# Patient Record
Sex: MALE | Race: WHITE | NOT HISPANIC OR LATINO | Employment: OTHER | URBAN - METROPOLITAN AREA
[De-identification: names, ages, dates, MRNs, and addresses within clinical notes are randomized per-mention and may not be internally consistent; named-entity substitution may affect disease eponyms.]

---

## 2017-02-15 ENCOUNTER — OFFICE VISIT (OUTPATIENT)
Dept: GASTROENTEROLOGY | Facility: CLINIC | Age: 77
End: 2017-02-15

## 2017-02-15 VITALS
HEIGHT: 70 IN | SYSTOLIC BLOOD PRESSURE: 118 MMHG | DIASTOLIC BLOOD PRESSURE: 76 MMHG | WEIGHT: 205 LBS | BODY MASS INDEX: 29.35 KG/M2

## 2017-02-15 DIAGNOSIS — K63.5 COLON POLYPS: Primary | ICD-10-CM

## 2017-02-15 DIAGNOSIS — K21.9 GASTROESOPHAGEAL REFLUX DISEASE, ESOPHAGITIS PRESENCE NOT SPECIFIED: ICD-10-CM

## 2017-02-15 PROCEDURE — 99213 OFFICE O/P EST LOW 20 MIN: CPT | Performed by: INTERNAL MEDICINE

## 2017-02-15 RX ORDER — MIRTAZAPINE 15 MG/1
15 TABLET, FILM COATED ORAL NIGHTLY
COMMUNITY
End: 2022-09-23

## 2017-02-15 NOTE — PROGRESS NOTES
"Chief Complaint   Patient presents with   • Follow-up     from scopes    • Heartburn       History of Present Illness: We reviewed the results of the 12/16 EGD with esophageal dilation. He noted some improvement with the belching after the EGD. He is seeing Dr. Aly Rutherford (put on Remeron). He was sent to a therapist (Dr. Claude Drouet). He has been  50 yrs and his wife and he are going thru therapy and \"we will be fine\".  The Remeron is helping. No abdominal or chest pain. No nausea or vomting. His weight is increasing. NO dysphagia, minimal belching. She doesn't know if the Omeprazole 40 mg/day helps or not.     Past Medical History   Diagnosis Date   • Abnormal barium swallow 07/08/2016     HH, reflux   • Anxiety    • GERD (gastroesophageal reflux disease)    • H/O cataract      Dr Heron Hoffman   • Hiatal hernia    • Hyperlipidemia    • Hypertension    • Internal hemorrhoids    • Seizures      July 2013 was last seizure   • Tubular adenoma of colon        Past Surgical History   Procedure Laterality Date   • Hernia repair     • Colonoscopy  04/23/2013     Dr Galeano/[facility], , tubular adenoma w/low grade dysplasia   • Eye surgery     • Tonsillectomy     • Endoscopy N/A 12/16/2016     Procedure: ESOPHAGOGASTRODUODENOSCOPY WITH COLD BIOPSIES AND 54 FR KENNY DILATATION;  Surgeon: Shiraz Galeano MD;  Location: [facility] ENDOSCOPY;  Service:          Current Outpatient Prescriptions:   •  mirtazapine (REMERON) 15 MG tablet, Take 15 mg by mouth., Disp: , Rfl:   •  amLODIPine (NORVASC) 5 MG tablet, TAKE ONE TABLET BY MOUTH DAILY, Disp: 90 tablet, Rfl: 2  •  BENICAR HCT 40-25 MG per tablet, TAKE ONE TABLET BY MOUTH DAILY, Disp: 90 tablet, Rfl: 2  •  clonazePAM (KlonoPIN) 0.5 MG tablet, Take 0.5 mg by mouth 2 (Two) Times a Day As Needed for seizures., Disp: , Rfl:   •  lacosamide (VIMPAT) 100 MG tablet tablet, Take 100 mg by mouth every 12 (twelve) hours., Disp: , Rfl:   •  metoprolol succinate XL (TOPROL-XL) 25 " MG 24 hr tablet, TAKE ONE TABLET BY MOUTH DAILY, Disp: 90 tablet, Rfl: 3  •  omeprazole (PRILOSEC) 40 MG capsule, Take 1 capsule by mouth daily., Disp: 30 capsule, Rfl: 11  •  zolpidem (AMBIEN) 10 MG tablet, TAKE ONE TABLET BY MOUTH EVERY NIGHT AT BEDTIME AS NEEDED, Disp: 30 tablet, Rfl: 1    No Known Allergies    Family History   Problem Relation Age of Onset   • Stroke Father        Social History     Social History   • Marital status:      Spouse name: N/A   • Number of children: N/A   • Years of education: N/A     Occupational History   • Not on file.     Social History Main Topics   • Smoking status: Never Smoker   • Smokeless tobacco: Not on file   • Alcohol use 1.2 oz/week     1 Cans of beer, 1 Shots of liquor per week      Comment: daily   • Drug use: No   • Sexual activity: Not on file     Other Topics Concern   • Not on file     Social History Narrative       Review of Systems   All other systems reviewed and are negative.      Vitals:    02/15/17 1302   BP: 118/76       Physical Exam   Constitutional: He is oriented to person, place, and time. He appears well-developed and well-nourished. No distress.   HENT:   Head: Normocephalic and atraumatic. Hair is normal.   Right Ear: Hearing, tympanic membrane, external ear and ear canal normal.   Left Ear: Hearing, tympanic membrane, external ear and ear canal normal.   Nose: No sinus tenderness or nasal deformity.   Mouth/Throat: Uvula is midline, oropharynx is clear and moist and mucous membranes are normal. No oral lesions. No uvula swelling.   Eyes: Conjunctivae, EOM and lids are normal. Pupils are equal, round, and reactive to light. Right eye exhibits no discharge. Left eye exhibits no discharge. No scleral icterus. Right eye exhibits normal extraocular motion and no nystagmus. Left eye exhibits normal extraocular motion and no nystagmus.   Neck: Normal range of motion. Neck supple. No JVD present. No thyromegaly present.   Cardiovascular: Normal  rate, regular rhythm, normal heart sounds, intact distal pulses and normal pulses.  Exam reveals no gallop.    No murmur heard.  Pulmonary/Chest: Effort normal and breath sounds normal. No respiratory distress. He has no wheezes. He has no rales. He exhibits no tenderness.   Abdominal: Soft. Bowel sounds are normal. He exhibits no distension and no mass. There is no tenderness. There is no guarding. No hernia.   Musculoskeletal: Normal range of motion. He exhibits no edema, tenderness or deformity.   Lymphadenopathy:     He has no cervical adenopathy.   Neurological: He is alert and oriented to person, place, and time. He has normal reflexes. He displays normal reflexes. No cranial nerve deficit. He exhibits normal muscle tone. Coordination normal.   Skin: Skin is warm and dry. No rash noted. He is not diaphoretic.   Psychiatric: He has a normal mood and affect. His behavior is normal. Judgment and thought content normal.   Vitals reviewed.      Gabo was seen today for follow-up and heartburn.    Diagnoses and all orders for this visit:    Colon polyps    Gastroesophageal reflux disease, esophagitis presence not specified     Assessment:  1) h/o colonic polyps.  2) GERD    Recommendations:  1) Repeat c/s in 4/18.  2) try to wean the Omeprazole  3) f/u prn      No Follow-up on file.

## 2018-01-09 ENCOUNTER — HOSPITAL ENCOUNTER (INPATIENT)
Facility: HOSPITAL | Age: 78
LOS: 3 days | Discharge: HOME | End: 2018-01-13
Attending: EMERGENCY MEDICINE | Admitting: HOSPITALIST

## 2018-01-09 DIAGNOSIS — R56.9 SEIZURE (HCC): Primary | ICD-10-CM

## 2018-01-09 DIAGNOSIS — R42 DIZZY: ICD-10-CM

## 2018-01-09 DIAGNOSIS — I10 ESSENTIAL HYPERTENSION: ICD-10-CM

## 2018-01-09 PROCEDURE — 85025 COMPLETE CBC W/AUTO DIFF WBC: CPT | Performed by: EMERGENCY MEDICINE

## 2018-01-09 PROCEDURE — 99285 EMERGENCY DEPT VISIT HI MDM: CPT

## 2018-01-09 PROCEDURE — 85730 THROMBOPLASTIN TIME PARTIAL: CPT | Performed by: EMERGENCY MEDICINE

## 2018-01-09 PROCEDURE — 84484 ASSAY OF TROPONIN QUANT: CPT | Performed by: EMERGENCY MEDICINE

## 2018-01-09 PROCEDURE — 80053 COMPREHEN METABOLIC PANEL: CPT | Performed by: EMERGENCY MEDICINE

## 2018-01-09 PROCEDURE — 85610 PROTHROMBIN TIME: CPT | Performed by: EMERGENCY MEDICINE

## 2018-01-09 RX ORDER — LABETALOL HYDROCHLORIDE 5 MG/ML
10 INJECTION, SOLUTION INTRAVENOUS ONCE
Status: COMPLETED | OUTPATIENT
Start: 2018-01-09 | End: 2018-01-10

## 2018-01-09 RX ORDER — LOSARTAN POTASSIUM AND HYDROCHLOROTHIAZIDE 25; 100 MG/1; MG/1
1 TABLET ORAL DAILY
COMMUNITY
End: 2018-01-13 | Stop reason: HOSPADM

## 2018-01-09 RX ORDER — SODIUM CHLORIDE 0.9 % (FLUSH) 0.9 %
10 SYRINGE (ML) INJECTION AS NEEDED
Status: DISCONTINUED | OUTPATIENT
Start: 2018-01-09 | End: 2018-01-13 | Stop reason: HOSPADM

## 2018-01-10 ENCOUNTER — APPOINTMENT (OUTPATIENT)
Dept: GENERAL RADIOLOGY | Facility: HOSPITAL | Age: 78
End: 2018-01-10

## 2018-01-10 ENCOUNTER — APPOINTMENT (OUTPATIENT)
Dept: MRI IMAGING | Facility: HOSPITAL | Age: 78
End: 2018-01-10
Attending: HOSPITALIST

## 2018-01-10 ENCOUNTER — APPOINTMENT (OUTPATIENT)
Dept: CT IMAGING | Facility: HOSPITAL | Age: 78
End: 2018-01-10

## 2018-01-10 ENCOUNTER — APPOINTMENT (OUTPATIENT)
Dept: CARDIOLOGY | Facility: HOSPITAL | Age: 78
End: 2018-01-10
Attending: HOSPITALIST

## 2018-01-10 PROBLEM — R56.9 SEIZURE (HCC): Status: ACTIVE | Noted: 2018-01-10

## 2018-01-10 LAB
ALBUMIN SERPL-MCNC: 4.4 G/DL (ref 3.5–5.2)
ALBUMIN/GLOB SERPL: 1.4 G/DL
ALP SERPL-CCNC: 109 U/L (ref 39–117)
ALT SERPL W P-5'-P-CCNC: 26 U/L (ref 1–41)
ANION GAP SERPL CALCULATED.3IONS-SCNC: 15 MMOL/L
APTT PPP: 27.5 SECONDS (ref 22.7–35.4)
AST SERPL-CCNC: 34 U/L (ref 1–40)
BACTERIA UR QL AUTO: ABNORMAL /HPF
BASOPHILS # BLD AUTO: 0.04 10*3/MM3 (ref 0–0.2)
BASOPHILS NFR BLD AUTO: 0.4 % (ref 0–1.5)
BILIRUB SERPL-MCNC: 0.7 MG/DL (ref 0.1–1.2)
BILIRUB UR QL STRIP: NEGATIVE
BUN BLD-MCNC: 18 MG/DL (ref 8–23)
BUN/CREAT SERPL: 15.7 (ref 7–25)
CALCIUM SPEC-SCNC: 9.3 MG/DL (ref 8.6–10.5)
CHLORIDE SERPL-SCNC: 94 MMOL/L (ref 98–107)
CLARITY UR: CLEAR
CO2 SERPL-SCNC: 27 MMOL/L (ref 22–29)
COLOR UR: YELLOW
CREAT BLD-MCNC: 1.15 MG/DL (ref 0.76–1.27)
DEPRECATED RDW RBC AUTO: 42.8 FL (ref 37–54)
EOSINOPHIL # BLD AUTO: 0.14 10*3/MM3 (ref 0–0.7)
EOSINOPHIL NFR BLD AUTO: 1.4 % (ref 0.3–6.2)
ERYTHROCYTE [DISTWIDTH] IN BLOOD BY AUTOMATED COUNT: 12.6 % (ref 11.5–14.5)
FLUAV AG NPH QL: NEGATIVE
FLUBV AG NPH QL IA: NEGATIVE
GFR SERPL CREATININE-BSD FRML MDRD: 62 ML/MIN/1.73
GLOBULIN UR ELPH-MCNC: 3.2 GM/DL
GLUCOSE BLD-MCNC: 112 MG/DL (ref 65–99)
GLUCOSE BLDC GLUCOMTR-MCNC: 123 MG/DL (ref 70–130)
GLUCOSE BLDC GLUCOMTR-MCNC: 143 MG/DL (ref 70–130)
GLUCOSE UR STRIP-MCNC: NEGATIVE MG/DL
HCT VFR BLD AUTO: 41.2 % (ref 40.4–52.2)
HGB BLD-MCNC: 14 G/DL (ref 13.7–17.6)
HGB UR QL STRIP.AUTO: ABNORMAL
HYALINE CASTS UR QL AUTO: ABNORMAL /LPF
IMM GRANULOCYTES # BLD: 0.03 10*3/MM3 (ref 0–0.03)
IMM GRANULOCYTES NFR BLD: 0.3 % (ref 0–0.5)
INR PPP: 1 (ref 0.9–1.1)
KETONES UR QL STRIP: NEGATIVE
LEUKOCYTE ESTERASE UR QL STRIP.AUTO: NEGATIVE
LYMPHOCYTES # BLD AUTO: 0.4 10*3/MM3 (ref 0.9–4.8)
LYMPHOCYTES NFR BLD AUTO: 4.1 % (ref 19.6–45.3)
MCH RBC QN AUTO: 31.7 PG (ref 27–32.7)
MCHC RBC AUTO-ENTMCNC: 34 G/DL (ref 32.6–36.4)
MCV RBC AUTO: 93.4 FL (ref 79.8–96.2)
MONOCYTES # BLD AUTO: 0.75 10*3/MM3 (ref 0.2–1.2)
MONOCYTES NFR BLD AUTO: 7.6 % (ref 5–12)
NEUTROPHILS # BLD AUTO: 8.47 10*3/MM3 (ref 1.9–8.1)
NEUTROPHILS NFR BLD AUTO: 86.2 % (ref 42.7–76)
NITRITE UR QL STRIP: NEGATIVE
PH UR STRIP.AUTO: 7.5 [PH] (ref 5–8)
PLATELET # BLD AUTO: 192 10*3/MM3 (ref 140–500)
PMV BLD AUTO: 10.8 FL (ref 6–12)
POTASSIUM BLD-SCNC: 3.5 MMOL/L (ref 3.5–5.2)
PROT SERPL-MCNC: 7.6 G/DL (ref 6–8.5)
PROT UR QL STRIP: NEGATIVE
PROTHROMBIN TIME: 12.8 SECONDS (ref 11.7–14.2)
RBC # BLD AUTO: 4.41 10*6/MM3 (ref 4.6–6)
RBC # UR: ABNORMAL /HPF
REF LAB TEST METHOD: ABNORMAL
SODIUM BLD-SCNC: 136 MMOL/L (ref 136–145)
SP GR UR STRIP: 1.01 (ref 1–1.03)
SQUAMOUS #/AREA URNS HPF: ABNORMAL /HPF
TROPONIN T SERPL-MCNC: 0.01 NG/ML (ref 0–0.03)
TROPONIN T SERPL-MCNC: <0.01 NG/ML (ref 0–0.03)
UROBILINOGEN UR QL STRIP: ABNORMAL
WBC NRBC COR # BLD: 9.83 10*3/MM3 (ref 4.5–10.7)
WBC UR QL AUTO: ABNORMAL /HPF

## 2018-01-10 PROCEDURE — 87581 M.PNEUMON DNA AMP PROBE: CPT | Performed by: INTERNAL MEDICINE

## 2018-01-10 PROCEDURE — 99223 1ST HOSP IP/OBS HIGH 75: CPT | Performed by: NURSE PRACTITIONER

## 2018-01-10 PROCEDURE — 70496 CT ANGIOGRAPHY HEAD: CPT

## 2018-01-10 PROCEDURE — 87798 DETECT AGENT NOS DNA AMP: CPT | Performed by: INTERNAL MEDICINE

## 2018-01-10 PROCEDURE — 82962 GLUCOSE BLOOD TEST: CPT

## 2018-01-10 PROCEDURE — 93306 TTE W/DOPPLER COMPLETE: CPT | Performed by: INTERNAL MEDICINE

## 2018-01-10 PROCEDURE — 25010000002 HYDRALAZINE PER 20 MG: Performed by: HOSPITALIST

## 2018-01-10 PROCEDURE — 93306 TTE W/DOPPLER COMPLETE: CPT

## 2018-01-10 PROCEDURE — 71046 X-RAY EXAM CHEST 2 VIEWS: CPT

## 2018-01-10 PROCEDURE — 93005 ELECTROCARDIOGRAM TRACING: CPT | Performed by: NURSE PRACTITIONER

## 2018-01-10 PROCEDURE — 0 IOPAMIDOL PER 1 ML: Performed by: HOSPITALIST

## 2018-01-10 PROCEDURE — 93010 ELECTROCARDIOGRAM REPORT: CPT | Performed by: INTERNAL MEDICINE

## 2018-01-10 PROCEDURE — 87486 CHLMYD PNEUM DNA AMP PROBE: CPT | Performed by: INTERNAL MEDICINE

## 2018-01-10 PROCEDURE — 81001 URINALYSIS AUTO W/SCOPE: CPT | Performed by: EMERGENCY MEDICINE

## 2018-01-10 PROCEDURE — A9577 INJ MULTIHANCE: HCPCS | Performed by: HOSPITALIST

## 2018-01-10 PROCEDURE — 70553 MRI BRAIN STEM W/O & W/DYE: CPT

## 2018-01-10 PROCEDURE — 87804 INFLUENZA ASSAY W/OPTIC: CPT | Performed by: EMERGENCY MEDICINE

## 2018-01-10 PROCEDURE — 99221 1ST HOSP IP/OBS SF/LOW 40: CPT | Performed by: INTERNAL MEDICINE

## 2018-01-10 PROCEDURE — 25810000003 SODIUM CHLORIDE 0.9 % WITH KCL 20 MEQ 20-0.9 MEQ/L-% SOLUTION: Performed by: HOSPITALIST

## 2018-01-10 PROCEDURE — 87633 RESP VIRUS 12-25 TARGETS: CPT | Performed by: INTERNAL MEDICINE

## 2018-01-10 PROCEDURE — 0 GADOBENATE DIMEGLUMINE 529 MG/ML SOLUTION: Performed by: HOSPITALIST

## 2018-01-10 PROCEDURE — 93005 ELECTROCARDIOGRAM TRACING: CPT | Performed by: HOSPITALIST

## 2018-01-10 PROCEDURE — 70498 CT ANGIOGRAPHY NECK: CPT

## 2018-01-10 PROCEDURE — 70450 CT HEAD/BRAIN W/O DYE: CPT

## 2018-01-10 PROCEDURE — 25010000002 PERFLUTREN (DEFINITY) 8.476 MG IN SODIUM CHLORIDE 0.9 % 10 ML INJECTION: Performed by: HOSPITALIST

## 2018-01-10 PROCEDURE — 84484 ASSAY OF TROPONIN QUANT: CPT | Performed by: HOSPITALIST

## 2018-01-10 RX ORDER — PANTOPRAZOLE SODIUM 40 MG/1
40 TABLET, DELAYED RELEASE ORAL EVERY MORNING
Status: DISCONTINUED | OUTPATIENT
Start: 2018-01-10 | End: 2018-01-13 | Stop reason: HOSPADM

## 2018-01-10 RX ORDER — LOSARTAN POTASSIUM AND HYDROCHLOROTHIAZIDE 25; 100 MG/1; MG/1
1 TABLET ORAL DAILY
Status: DISCONTINUED | OUTPATIENT
Start: 2018-01-10 | End: 2018-01-10

## 2018-01-10 RX ORDER — ISOSORBIDE MONONITRATE 60 MG/1
60 TABLET, EXTENDED RELEASE ORAL
Status: DISCONTINUED | OUTPATIENT
Start: 2018-01-10 | End: 2018-01-11

## 2018-01-10 RX ORDER — HYDRALAZINE HYDROCHLORIDE 20 MG/ML
20 INJECTION INTRAMUSCULAR; INTRAVENOUS EVERY 4 HOURS PRN
Status: DISCONTINUED | OUTPATIENT
Start: 2018-01-10 | End: 2018-01-10

## 2018-01-10 RX ORDER — MIRTAZAPINE 15 MG/1
15 TABLET, FILM COATED ORAL NIGHTLY
Status: DISCONTINUED | OUTPATIENT
Start: 2018-01-10 | End: 2018-01-13 | Stop reason: HOSPADM

## 2018-01-10 RX ORDER — ACETAMINOPHEN 500 MG
TABLET ORAL
Status: COMPLETED
Start: 2018-01-10 | End: 2018-01-10

## 2018-01-10 RX ORDER — ISOSORBIDE MONONITRATE 30 MG/1
30 TABLET, EXTENDED RELEASE ORAL
Status: DISCONTINUED | OUTPATIENT
Start: 2018-01-10 | End: 2018-01-10

## 2018-01-10 RX ORDER — GUAIFENESIN AND DEXTROMETHORPHAN HYDROBROMIDE 600; 30 MG/1; MG/1
1 TABLET, EXTENDED RELEASE ORAL 2 TIMES DAILY PRN
Status: DISCONTINUED | OUTPATIENT
Start: 2018-01-10 | End: 2018-01-13

## 2018-01-10 RX ORDER — ACETAMINOPHEN 650 MG/1
650 SUPPOSITORY RECTAL EVERY 4 HOURS PRN
Status: DISCONTINUED | OUTPATIENT
Start: 2018-01-10 | End: 2018-01-13

## 2018-01-10 RX ORDER — SODIUM CHLORIDE 0.9 % (FLUSH) 0.9 %
1-10 SYRINGE (ML) INJECTION AS NEEDED
Status: DISCONTINUED | OUTPATIENT
Start: 2018-01-10 | End: 2018-01-13

## 2018-01-10 RX ORDER — LACOSAMIDE 50 MG/1
150 TABLET ORAL EVERY 12 HOURS SCHEDULED
Status: DISCONTINUED | OUTPATIENT
Start: 2018-01-10 | End: 2018-01-13 | Stop reason: HOSPADM

## 2018-01-10 RX ORDER — LABETALOL HYDROCHLORIDE 5 MG/ML
10 INJECTION, SOLUTION INTRAVENOUS ONCE
Status: DISCONTINUED | OUTPATIENT
Start: 2018-01-10 | End: 2018-01-10

## 2018-01-10 RX ORDER — OLMESARTAN MEDOXOMIL AND HYDROCHLOROTHIAZIDE 40/25 40; 25 MG/1; MG/1
1 TABLET ORAL
Status: DISCONTINUED | OUTPATIENT
Start: 2018-01-10 | End: 2018-01-10

## 2018-01-10 RX ORDER — OLMESARTAN MEDOXOMIL AND HYDROCHLOROTHIAZIDE 40/25 40; 25 MG/1; MG/1
1 TABLET ORAL
Status: DISCONTINUED | OUTPATIENT
Start: 2018-01-10 | End: 2018-01-10 | Stop reason: CLARIF

## 2018-01-10 RX ORDER — HYDROCHLOROTHIAZIDE 25 MG/1
25 TABLET ORAL DAILY
Status: DISCONTINUED | OUTPATIENT
Start: 2018-01-10 | End: 2018-01-11

## 2018-01-10 RX ORDER — ONDANSETRON 2 MG/ML
4 INJECTION INTRAMUSCULAR; INTRAVENOUS EVERY 6 HOURS PRN
Status: DISCONTINUED | OUTPATIENT
Start: 2018-01-10 | End: 2018-01-13

## 2018-01-10 RX ORDER — ACETAMINOPHEN 500 MG
1000 TABLET ORAL ONCE
Status: COMPLETED | OUTPATIENT
Start: 2018-01-10 | End: 2018-01-10

## 2018-01-10 RX ORDER — METOPROLOL SUCCINATE 50 MG/1
50 TABLET, EXTENDED RELEASE ORAL
Status: DISCONTINUED | OUTPATIENT
Start: 2018-01-10 | End: 2018-01-13 | Stop reason: HOSPADM

## 2018-01-10 RX ORDER — SODIUM CHLORIDE AND POTASSIUM CHLORIDE 150; 900 MG/100ML; MG/100ML
75 INJECTION, SOLUTION INTRAVENOUS CONTINUOUS
Status: DISCONTINUED | OUTPATIENT
Start: 2018-01-10 | End: 2018-01-11

## 2018-01-10 RX ORDER — HYDRALAZINE HYDROCHLORIDE 10 MG/1
10 TABLET, FILM COATED ORAL EVERY 4 HOURS PRN
Status: DISCONTINUED | OUTPATIENT
Start: 2018-01-10 | End: 2018-01-13

## 2018-01-10 RX ORDER — CLONAZEPAM 0.5 MG/1
0.5 TABLET ORAL 2 TIMES DAILY PRN
Status: DISCONTINUED | OUTPATIENT
Start: 2018-01-10 | End: 2018-01-13

## 2018-01-10 RX ORDER — ACETAMINOPHEN 325 MG/1
650 TABLET ORAL EVERY 4 HOURS PRN
Status: DISCONTINUED | OUTPATIENT
Start: 2018-01-10 | End: 2018-01-13

## 2018-01-10 RX ORDER — IPRATROPIUM BROMIDE AND ALBUTEROL SULFATE 2.5; .5 MG/3ML; MG/3ML
3 SOLUTION RESPIRATORY (INHALATION) EVERY 4 HOURS PRN
Status: DISCONTINUED | OUTPATIENT
Start: 2018-01-10 | End: 2018-01-13

## 2018-01-10 RX ORDER — ZOLPIDEM TARTRATE 5 MG/1
5 TABLET ORAL NIGHTLY PRN
Status: DISCONTINUED | OUTPATIENT
Start: 2018-01-10 | End: 2018-01-13

## 2018-01-10 RX ORDER — LACOSAMIDE 100 MG/1
100 TABLET ORAL EVERY 12 HOURS SCHEDULED
Status: DISCONTINUED | OUTPATIENT
Start: 2018-01-10 | End: 2018-01-10

## 2018-01-10 RX ORDER — AMLODIPINE BESYLATE 10 MG/1
10 TABLET ORAL
Status: DISCONTINUED | OUTPATIENT
Start: 2018-01-10 | End: 2018-01-13 | Stop reason: HOSPADM

## 2018-01-10 RX ORDER — ASPIRIN 81 MG/1
81 TABLET, CHEWABLE ORAL DAILY
Status: DISCONTINUED | OUTPATIENT
Start: 2018-01-10 | End: 2018-01-11

## 2018-01-10 RX ORDER — LOSARTAN POTASSIUM 100 MG/1
100 TABLET ORAL
Status: DISCONTINUED | OUTPATIENT
Start: 2018-01-10 | End: 2018-01-13 | Stop reason: HOSPADM

## 2018-01-10 RX ADMIN — HYDRALAZINE HYDROCHLORIDE 20 MG: 20 INJECTION INTRAMUSCULAR; INTRAVENOUS at 08:46

## 2018-01-10 RX ADMIN — LOSARTAN POTASSIUM 100 MG: 100 TABLET ORAL at 14:21

## 2018-01-10 RX ADMIN — Medication 1000 MG: at 12:05

## 2018-01-10 RX ADMIN — IOPAMIDOL 95 ML: 755 INJECTION, SOLUTION INTRAVENOUS at 17:20

## 2018-01-10 RX ADMIN — ISOSORBIDE MONONITRATE 60 MG: 60 TABLET, EXTENDED RELEASE ORAL at 11:57

## 2018-01-10 RX ADMIN — SODIUM CHLORIDE 5 MG/HR: 900 INJECTION, SOLUTION INTRAVENOUS at 10:52

## 2018-01-10 RX ADMIN — LACOSAMIDE 100 MG: 100 TABLET, FILM COATED ORAL at 14:20

## 2018-01-10 RX ADMIN — METOPROLOL SUCCINATE 50 MG: 50 TABLET, FILM COATED, EXTENDED RELEASE ORAL at 11:57

## 2018-01-10 RX ADMIN — ASPIRIN 81 MG: 81 TABLET, CHEWABLE ORAL at 14:20

## 2018-01-10 RX ADMIN — HYDROCHLOROTHIAZIDE 25 MG: 25 TABLET ORAL at 14:20

## 2018-01-10 RX ADMIN — PERFLUTREN 3 ML: 6.52 INJECTION, SUSPENSION INTRAVENOUS at 19:44

## 2018-01-10 RX ADMIN — SODIUM CHLORIDE 1000 ML: 9 INJECTION, SOLUTION INTRAVENOUS at 00:21

## 2018-01-10 RX ADMIN — PANTOPRAZOLE SODIUM 40 MG: 40 TABLET, DELAYED RELEASE ORAL at 14:20

## 2018-01-10 RX ADMIN — POTASSIUM CHLORIDE AND SODIUM CHLORIDE 75 ML/HR: 900; 150 INJECTION, SOLUTION INTRAVENOUS at 11:31

## 2018-01-10 RX ADMIN — HYDROCODONE BITARTRATE AND HOMATROPINE METHYLBROMIDE 5 ML: 5; 1.5 SOLUTION ORAL at 23:50

## 2018-01-10 RX ADMIN — LACOSAMIDE 150 MG: 100 TABLET, FILM COATED ORAL at 20:53

## 2018-01-10 RX ADMIN — HYDRALAZINE HYDROCHLORIDE 10 MG: 10 TABLET, FILM COATED ORAL at 06:50

## 2018-01-10 RX ADMIN — LABETALOL HYDROCHLORIDE 10 MG: 5 INJECTION, SOLUTION INTRAVENOUS at 00:21

## 2018-01-10 RX ADMIN — MIRTAZAPINE 15 MG: 15 TABLET, FILM COATED ORAL at 23:50

## 2018-01-10 RX ADMIN — AMLODIPINE BESYLATE 10 MG: 10 TABLET ORAL at 11:57

## 2018-01-10 RX ADMIN — GADOBENATE DIMEGLUMINE 20 ML: 529 INJECTION, SOLUTION INTRAVENOUS at 15:25

## 2018-01-10 RX ADMIN — METOPROLOL TARTRATE 5 MG: 5 INJECTION, SOLUTION INTRAVENOUS at 12:09

## 2018-01-10 RX ADMIN — ACETAMINOPHEN 1000 MG: 500 TABLET ORAL at 12:05

## 2018-01-10 NOTE — PLAN OF CARE
Problem: Patient Care Overview (Adult)  Goal: Plan of Care Review  Outcome: Ongoing (interventions implemented as appropriate)   01/10/18 1630   Coping/Psychosocial Response Interventions   Plan Of Care Reviewed With patient;spouse   Patient Care Overview   Progress improving   Outcome Evaluation   Outcome Summary/Follow up Plan admitted for seizure-like activity per wife's report. CT (-), MRI (-) for acute process. while in ER, pt went into new onset, rapid afib--rates 130-170s. cardizem gtt started, IV lopressor given. BP elevated, home meds restarted and now WNL. denies pain and soa. no other issues. AUSTEN Tran RN     Goal: Adult Individualization and Mutuality  Outcome: Ongoing (interventions implemented as appropriate)    Goal: Discharge Needs Assessment  Outcome: Ongoing (interventions implemented as appropriate)

## 2018-01-10 NOTE — ED NOTES
Dr. Gong paged due to increased heart rate and change in rhythm.      Narda Fernando RN  01/10/18 1280

## 2018-01-10 NOTE — H&P
History and physical    Primary care physician  Dr. IQBAL    Chief complaint  Altered mental status    History of present illness  77-year-old white male with history of seizure disorder for last 6 years on and that other medical problem hypertension hyperlipidemia gastroesophageal reflux disease anxiety disorder brought to the emergency room by wife when she noticed shaking spells with altered mental status.  Patient evaluated in ER to have very high blood pressure and admitted for management.  Patient admitted that he found to be in A. fib with rapid ventricular rate.  Patient denies any chest pain shortness of breath palpitation.  Patient remained fully alert oriented.  Patient feels nauseous but no vomiting.  Patient stated that he did not have any more seizures since she started on Vimpat 6 years ago.       PAST MEDICAL HISTORY    • Abnormal barium swallow 07/08/2016   • Anxiety     • GERD (gastroesophageal reflux disease)     • H/O cataract     • Hiatal hernia     • Hyperlipidemia     • Hypertension     • Internal hemorrhoids     • Seizures     • Tubular adenoma of colon           PAST SURGICAL HISTORY   Surgical History    Past Surgical History:   Procedure Laterality Date   • COLONOSCOPY   04/23/2013     Dr Galeano/[place], , tubular adenoma w/low grade dysplasia   • ENDOSCOPY N/A 12/16/2016     Procedure: ESOPHAGOGASTRODUODENOSCOPY WITH COLD BIOPSIES AND 54 FR KENNY DILATATION;  Surgeon: Shiraz Galeano MD;  Location: [facility] ENDOSCOPY;  Service:    • EYE SURGERY       • HERNIA REPAIR       • TONSILLECTOMY                FAMILY HISTORY        Family History   Problem Relation Age of Onset   • Stroke Father           SOCIAL HISTORY   Social History    Social History            Social History   • Marital status:        Spouse name: N/A   • Number of children: N/A   • Years of education: N/A          Occupational History   • Not on file.              Social History Main Topics    • Smoking status:  "Never Smoker    • Smokeless tobacco: Not on file    • Alcohol use 1.2 oz/week       1 Cans of beer, 1 Shots of liquor per week         Comment: daily    • Drug use: No    • Sexual activity: Not on file            Other Topics Concern   • Not on file      Social History Narrative            ALLERGIES  Review of patient's allergies indicates no known allergies.    Home Medications reviewed     REVIEW OF SYSTEMS  Review of Systems   Constitutional: Negative for chills and fever.   HENT: Negative for congestion and sore throat.    Eyes: Negative.    Respiratory: Negative for cough and shortness of breath.    Cardiovascular: Negative for chest pain and leg swelling.   Gastrointestinal: Negative for abdominal pain, diarrhea and vomiting.   Genitourinary: Negative for difficulty urinating and dysuria.   Musculoskeletal: Negative for back pain and neck pain.   Skin: Negative for rash and wound.   Allergic/Immunologic: Negative.    Neurological: Positive for seizures. Negative for dizziness, weakness, numbness and headaches.   Psychiatric/Behavioral: Positive for confusion.   All other systems reviewed and are negative.     PHYSICAL EXAM  Blood pressure (!) 199/117, pulse (!) 135, temperature (!) 100.9 °F (38.3 °C), resp. rate 18, height 177.8 cm (70\"), weight 96.6 kg (213 lb), SpO2 94 %.    Constitutional: He is oriented to person, place, and time and well-developed, well-nourished, and in no distress.   Head: Normocephalic and atraumatic.   Eyes: EOM are normal. Pupils are equal, round, and reactive to light.   Neck: Normal range of motion. Neck supple.   Cardiovascular: Normal rate, regular rhythm and normal heart sounds.    Pulmonary/Chest: Effort normal and breath sounds normal. No respiratory distress.   Abdominal: Soft. There is no tenderness. There is no rebound and no guarding.   Musculoskeletal: Normal range of motion. He exhibits no edema.   Neurological: He is alert and oriented to person, place, and time. "   Generally weak, no focal deficits.    Skin: Skin is warm and dry.   Psychiatric: Mood and affect normal.      LAB RESULTS  Lab Results (last 24 hours)     Procedure Component Value Units Date/Time    CBC & Differential [[id number]] Collected:  01/09/18 2349    Specimen:  Blood Updated:  01/10/18 0012    Narrative:       The following orders were created for panel order CBC & Differential.  Procedure                               Abnormality         Status                     ---------                               -----------         ------                     CBC Auto Differential[[id number]]         Abnormal            Final result                 Please view results for these tests on the individual orders.    CBC Auto Differential [[id number]]  (Abnormal) Collected:  01/09/18 2349    Specimen:  Blood Updated:  01/10/18 0012     WBC 9.83 10*3/mm3      RBC 4.41 (L) 10*6/mm3      Hemoglobin 14.0 g/dL      Hematocrit 41.2 %      MCV 93.4 fL      MCH 31.7 pg      MCHC 34.0 g/dL      RDW 12.6 %      RDW-SD 42.8 fl      MPV 10.8 fL      Platelets 192 10*3/mm3      Neutrophil % 86.2 (H) %      Lymphocyte % 4.1 (L) %      Monocyte % 7.6 %      Eosinophil % 1.4 %      Basophil % 0.4 %      Immature Grans % 0.3 %      Neutrophils, Absolute 8.47 (H) 10*3/mm3      Lymphocytes, Absolute 0.40 (L) 10*3/mm3      Monocytes, Absolute 0.75 10*3/mm3      Eosinophils, Absolute 0.14 10*3/mm3      Basophils, Absolute 0.04 10*3/mm3      Immature Grans, Absolute 0.03 10*3/mm3     Protime-INR [[id number]]  (Normal) Collected:  01/09/18 2349    Specimen:  Blood Updated:  01/10/18 0021     Protime 12.8 Seconds      INR 1.00    aPTT [[id number]]  (Normal) Collected:  01/09/18 2349    Specimen:  Blood Updated:  01/10/18 0021     PTT 27.5 seconds     Comprehensive Metabolic Panel [[id number]]  (Abnormal) Collected:  01/09/18 2349    Specimen:  Blood Updated:  01/10/18 0025     Glucose 112 (H) mg/dL      BUN 18 mg/dL      Creatinine 1.15 mg/dL       Sodium 136 mmol/L      Potassium 3.5 mmol/L      Chloride 94 (L) mmol/L      CO2 27.0 mmol/L      Calcium 9.3 mg/dL      Total Protein 7.6 g/dL      Albumin 4.40 g/dL      ALT (SGPT) 26 U/L      AST (SGOT) 34 U/L      Alkaline Phosphatase 109 U/L      Total Bilirubin 0.7 mg/dL      eGFR Non African Amer 62 mL/min/1.73      Globulin 3.2 gm/dL      A/G Ratio 1.4 g/dL      BUN/Creatinine Ratio 15.7     Anion Gap 15.0 mmol/L     Narrative:       The MDRD GFR formula is only valid for adults with stable renal function between ages 18 and 70.    Troponin [[id number]]  (Normal) Collected:  01/09/18 2349    Specimen:  Blood Updated:  01/10/18 0025     Troponin T <0.010 ng/mL     Narrative:       Troponin T Reference Ranges:  Less than 0.03 ng/mL:    Negative for AMI  0.03 to 0.09 ng/mL:      Indeterminant for AMI  Greater than 0.09 ng/mL: Positive for AMI    Urinalysis With / Culture If Indicated - Urine, Clean Catch [[id number]]  (Abnormal) Collected:  01/10/18 0026    Specimen:  Urine from Urine, Clean Catch Updated:  01/10/18 0043     Color, UA Yellow     Appearance, UA Clear     pH, UA 7.5     Specific Gravity, UA 1.011     Glucose, UA Negative     Ketones, UA Negative     Bilirubin, UA Negative     Blood, UA Small (1+) (A)     Protein, UA Negative     Leuk Esterase, UA Negative     Nitrite, UA Negative     Urobilinogen, UA 0.2 E.U./dL    Urinalysis, Microscopic Only - Urine, Clean Catch [[id number]]  (Abnormal) Collected:  01/10/18 0026    Specimen:  Urine from Urine, Clean Catch Updated:  01/10/18 0044     RBC, UA 13-20 (A) /HPF      WBC, UA 0-2 /HPF      Bacteria, UA None Seen /HPF      Squamous Epithelial Cells, UA 0-2 /HPF      Hyaline Casts, UA None Seen /LPF      Methodology Automated Microscopy    Influenza Antigen, Rapid - Swab, Nasopharynx [[id number]]  (Normal) Collected:  01/10/18 0026    Specimen:  Swab from Nasopharynx Updated:  01/10/18 0050     Influenza A Ag, EIA Negative     Influenza B Ag, EIA Negative     Troponin [[id number]]  (Normal) Collected:  01/10/18 1036    Specimen:  Blood Updated:  01/10/18 1110     Troponin T 0.010 ng/mL     Narrative:       Troponin T Reference Ranges:  Less than 0.03 ng/mL:    Negative for AMI  0.03 to 0.09 ng/mL:      Indeterminant for AMI  Greater than 0.09 ng/mL: Positive for AMI        Imaging Results (last 24 hours)     Procedure Component Value Units Date/Time    XR Chest 2 View [[id number]] Collected:  01/10/18 0024     Updated:  01/10/18 0024    Narrative:       CHEST PA AND LATERAL.     HISTORY: Cough.     COMPARISON: 12/20/2011.     FINDINGS:  Cardiomediastinal silhouette is within normal limits.         There is no consolidation or effusion. Lung volumes are low.          Impression:       No acute findings.           CT Head Without Contrast [[id number]] Collected:  01/10/18 0145     Updated:  01/10/18 0146    Narrative:       CT SCAN OF THE BRAIN WITHOUT CONTRAST.     TECHNIQUE: Radiation dose reduction techniques were utilized, including  automated exposure control and exposure modulation based on body size.  Multiple axial images of the brain were obtained from the vertex to the  base of the brain.     HISTORY: Altered mental status. Headache.     COMPARISON: 05/30/2016.     FINDINGS:   Midline structures are within normal limits, there is no hydrocephalus.  Gray-white matter differentiation is maintained. Findings of small  vessel ischemic disease, a few old lacunar infarcts and cortical  atrophy. 1.2 cm hygroma layers the right cerebral hemisphere, no  significant change.     Orbits are within normal limits. No significant mucosal disease of the  para-nasal sinuses. Mastoid air cells are well aerated.              Impression:       No definite acute intracranial pathology. Overall no significant change.                 EKG  Atrial fibrillation with rapid ventricle rate  Nonspecific ST-T wave changes  Noted EKG for comparison      Current Facility-Administered Medications:   •   amLODIPine (NORVASC) tablet 10 mg, 10 mg, Oral, Q24H, James Gong MD  •  aspirin chewable tablet 81 mg, 81 mg, Oral, Daily, James Gong MD  •  clonazePAM (KlonoPIN) tablet 0.5 mg, 0.5 mg, Oral, BID PRN, James Gong MD  •  diltiaZEM (CARDIZEM) IVPB 100 mg/100 mL (1 mg/mL) NS (add-vantage), 5-15 mg/hr, Intravenous, Titrated, Colin Escoto MD, Last Rate: 5 mL/hr at 01/10/18 1052, 5 mg/hr at 01/10/18 1052  •  hydrALAZINE (APRESOLINE) tablet 10 mg, 10 mg, Oral, Q4H PRN, James Gong MD, 10 mg at 01/10/18 0650  •  isosorbide mononitrate (IMDUR) 24 hr tablet 60 mg, 60 mg, Oral, Q24H, James Gong MD  •  lacosamide (VIMPAT) tablet 100 mg, 100 mg, Oral, Q12H, James Gong MD  •  losartan (COZAAR) tablet 100 mg, 100 mg, Oral, Q24H, James Gong MD  •  metoprolol succinate XL (TOPROL-XL) 24 hr tablet 50 mg, 50 mg, Oral, Q24H, James Gong MD  •  mirtazapine (REMERON) tablet 15 mg, 15 mg, Oral, Nightly, James Gong MD  •  pantoprazole (PROTONIX) EC tablet 40 mg, 40 mg, Oral, QAM, James Gong MD  •  Insert peripheral IV, , , Once **AND** sodium chloride 0.9 % flush 10 mL, 10 mL, Intravenous, PRN, Venkat Ahumada MD  •  sodium chloride 0.9 % with KCl 20 mEq/L infusion, 75 mL/hr, Intravenous, Continuous, James Gong MD  •  zolpidem (AMBIEN) tablet 5 mg, 5 mg, Oral, Nightly PRN, James Gong MD    Current Outpatient Prescriptions:   •  clonazePAM (KlonoPIN) 0.5 MG tablet, Take 0.5 mg by mouth 2 (Two) Times a Day As Needed for seizures., Disp: , Rfl:   •  lacosamide (VIMPAT) 100 MG tablet tablet, Take 100 mg by mouth every 12 (twelve) hours., Disp: , Rfl:   •  losartan-hydrochlorothiazide (HYZAAR) 100-25 MG per tablet, Take 1 tablet by mouth Daily., Disp: , Rfl:   •  metoprolol succinate XL (TOPROL-XL) 25 MG 24 hr tablet, TAKE ONE TABLET BY MOUTH DAILY, Disp: 90 tablet, Rfl: 3  •  mirtazapine (REMERON) 15 MG tablet, Take 15 mg by mouth., Disp: , Rfl:   •  zolpidem (AMBIEN) 10 MG tablet, TAKE ONE TABLET BY MOUTH  EVERY NIGHT AT BEDTIME AS NEEDED, Disp: 30 tablet, Rfl: 1  •  amLODIPine (NORVASC) 5 MG tablet, TAKE ONE TABLET BY MOUTH DAILY, Disp: 90 tablet, Rfl: 2  •  BENICAR HCT 40-25 MG per tablet, TAKE ONE TABLET BY MOUTH DAILY, Disp: 90 tablet, Rfl: 2  •  omeprazole (PRILOSEC) 40 MG capsule, Take 1 capsule by mouth daily., Disp: 30 capsule, Rfl: 11     ASSESSMENT  Hypertensive urgency  Shaking spell with history of seizure disorder rule out recurrent seizures  New onset A. fib with rapid ventricle rate  Gastroesophageal reflux disease  Anxiety disorder  Dehydration    PLAN  Admit  Cardizem drip  Stabilize blood pressure  Supplement oxygen aspirin nitroglycerin and beta blocker  Hold for Lovenox  Check MRI of the brain and EEG  Check 2-D echo  Cardiology consult  Neurology consult  Continue home medication  Stress ulcer DVT prophylaxis  Follow closely further recommendation according to hospital course    GENEVIEVE ALEJO MD

## 2018-01-10 NOTE — ED NOTES
Blood glucose 123. Spoke with Evin quintanaing pt's tachycardia and hypertension. Orders for 5 mg lopressor and 1 gram tylenol and to give all oral meds.     Narda Fernando RN  01/10/18 1203       Narda Fernando RN  01/10/18 1202

## 2018-01-10 NOTE — ED NOTES
Pt assisted to head of bed after using urinal at side of bed with wife.  Linens straightened and vital signs taken.  Warm blanket provided.      Narda Fernando RN  01/10/18 4485

## 2018-01-10 NOTE — ED PROVIDER NOTES
EMERGENCY DEPARTMENT ENCOUNTER    CHIEF COMPLAINT  Chief Complaint: Altered mental status   History given by: pt's family, pt  History limited by: n/a  Room Number: 48/48  PMD: MD Dr Vincenzo Spaulding, neurologist    HPI:  Pt is a 77 y.o. male who presents for evaluation of altered mental status that began tonight. Pt's wife at bedside provides the majority of the HPI, and states that the pt began stumbling tonight and was confused. She states that the pt has a hx of seizures 6 years ago (1 grand mal, multiple petite mal) and in the past, she was told the pt was having a sz when he was acting similar to tonight. Pt's wife states that after the pt became altered, she gave him his Klonopin and Vimpat. At this time, pt denies pain, SOA, ot any other sx. His wife states that the pt has been on a Zpak s/t URI sx (including non productive cough).     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   • Hygroma 04/02/2014   • Esophageal spasm 06/27/2016     Resolved Ambulatory Problems     Diagnosis Date Noted   • Headache 05/31/2016     Past Medical History:   Diagnosis Date   • Abnormal barium swallow 07/08/2016   • Anxiety    • GERD (gastroesophageal reflux disease)    • H/O cataract    • Hiatal hernia    • Hyperlipidemia    • Hypertension    • Internal hemorrhoids    • Seizures    • Tubular adenoma of colon        PAST SURGICAL HISTORY  Past Surgical History:   Procedure Laterality Date   • COLONOSCOPY  04/23/2013    Dr Galeano/[facility], tubular adenoma w/low  grade dysplasia   • ENDOSCOPY N/A 12/16/2016    Procedure: ESOPHAGOGASTRODUODENOSCOPY WITH COLD BIOPSIES AND 54 FR KENNY DILATATION;  Surgeon: Shiraz Galeano MD;  Location: [facility] ENDOSCOPY;  Service:    • EYE SURGERY     • HERNIA REPAIR     • TONSILLECTOMY         FAMILY HISTORY  Family History   Problem Relation Age of Onset   • Stroke Father        SOCIAL HISTORY  Social History     Social History   • Marital status:      Spouse name: N/A   • Number of children: N/A   • Years of education: N/A     Occupational History   • Not on file.     Social History Main Topics   • Smoking status: Never Smoker   • Smokeless tobacco: Not on file   • Alcohol use 1.2 oz/week     1 Cans of beer, 1 Shots of liquor per week      Comment: daily   • Drug use: No   • Sexual activity: Not on file     Other Topics Concern   • Not on file     Social History Narrative       ALLERGIES  Review of patient's allergies indicates no known allergies.    REVIEW OF SYSTEMS  Review of Systems   Constitutional: Negative for chills and fever.   HENT: Negative for congestion and sore throat.    Eyes: Negative.    Respiratory: Negative for cough and shortness of breath.    Cardiovascular: Negative for chest pain and leg swelling.   Gastrointestinal: Negative for abdominal pain, diarrhea and vomiting.   Genitourinary: Negative for difficulty urinating and dysuria.   Musculoskeletal: Negative for back pain and neck pain.   Skin: Negative for rash and wound.   Allergic/Immunologic: Negative.    Neurological: Positive for seizures. Negative for dizziness, weakness, numbness and headaches.   Psychiatric/Behavioral: Positive for confusion.   All other systems reviewed and are negative.      PHYSICAL EXAM  ED Triage Vitals   Temp Heart Rate Resp BP SpO2   01/09/18 2302 01/09/18 2258 01/09/18 2258 01/09/18 2258 01/09/18 2258   99.1 °F (37.3 °C) 80 18 220/120 96 %      Temp src Heart Rate Source Patient Position BP Location FiO2 (%)   01/09/18 2302  01/09/18 2258 01/09/18 2336 01/09/18 2336 --   Tympanic Monitor Lying Right arm        Physical Exam   Constitutional: He is oriented to person, place, and time and well-developed, well-nourished, and in no distress.   HENT:   Head: Normocephalic and atraumatic.   Eyes: EOM are normal. Pupils are equal, round, and reactive to light.   Neck: Normal range of motion. Neck supple.   Cardiovascular: Normal rate, regular rhythm and normal heart sounds.    Pulmonary/Chest: Effort normal and breath sounds normal. No respiratory distress.   Abdominal: Soft. There is no tenderness. There is no rebound and no guarding.   Musculoskeletal: Normal range of motion. He exhibits no edema.   Neurological: He is alert and oriented to person, place, and time.   Generally weak, no focal deficits.    Skin: Skin is warm and dry.   Psychiatric: Mood and affect normal.   Nursing note and vitals reviewed.      LAB RESULTS  Lab Results (last 24 hours)     Procedure Component Value Units Date/Time    CBC & Differential [[id number]] Collected:  01/09/18 2349    Specimen:  Blood Updated:  01/10/18 0012    Narrative:       The following orders were created for panel order CBC & Differential.  Procedure                               Abnormality         Status                     ---------                               -----------         ------                     CBC Auto Differential[[id number]]         Abnormal            Final result                 Please view results for these tests on the individual orders.    Comprehensive Metabolic Panel [[id number]]  (Abnormal) Collected:  01/09/18 2349    Specimen:  Blood Updated:  01/10/18 0025     Glucose 112 (H) mg/dL      BUN 18 mg/dL      Creatinine 1.15 mg/dL      Sodium 136 mmol/L      Potassium 3.5 mmol/L      Chloride 94 (L) mmol/L      CO2 27.0 mmol/L      Calcium 9.3 mg/dL      Total Protein 7.6 g/dL      Albumin 4.40 g/dL      ALT (SGPT) 26 U/L      AST (SGOT) 34 U/L      Alkaline Phosphatase 109  U/L      Total Bilirubin 0.7 mg/dL      eGFR Non African Amer 62 mL/min/1.73      Globulin 3.2 gm/dL      A/G Ratio 1.4 g/dL      BUN/Creatinine Ratio 15.7     Anion Gap 15.0 mmol/L     Narrative:       The MDRD GFR formula is only valid for adults with stable renal function between ages 18 and 70.    Protime-INR [[id number]]  (Normal) Collected:  01/09/18 2349    Specimen:  Blood Updated:  01/10/18 0021     Protime 12.8 Seconds      INR 1.00    aPTT [[id number]]  (Normal) Collected:  01/09/18 2349    Specimen:  Blood Updated:  01/10/18 0021     PTT 27.5 seconds     Troponin [[id number]]  (Normal) Collected:  01/09/18 2349    Specimen:  Blood Updated:  01/10/18 0025     Troponin T <0.010 ng/mL     Narrative:       Troponin T Reference Ranges:  Less than 0.03 ng/mL:    Negative for AMI  0.03 to 0.09 ng/mL:      Indeterminant for AMI  Greater than 0.09 ng/mL: Positive for AMI    CBC Auto Differential [[id number]]  (Abnormal) Collected:  01/09/18 2349    Specimen:  Blood Updated:  01/10/18 0012     WBC 9.83 10*3/mm3      RBC 4.41 (L) 10*6/mm3      Hemoglobin 14.0 g/dL      Hematocrit 41.2 %      MCV 93.4 fL      MCH 31.7 pg      MCHC 34.0 g/dL      RDW 12.6 %      RDW-SD 42.8 fl      MPV 10.8 fL      Platelets 192 10*3/mm3      Neutrophil % 86.2 (H) %      Lymphocyte % 4.1 (L) %      Monocyte % 7.6 %      Eosinophil % 1.4 %      Basophil % 0.4 %      Immature Grans % 0.3 %      Neutrophils, Absolute 8.47 (H) 10*3/mm3      Lymphocytes, Absolute 0.40 (L) 10*3/mm3      Monocytes, Absolute 0.75 10*3/mm3      Eosinophils, Absolute 0.14 10*3/mm3      Basophils, Absolute 0.04 10*3/mm3      Immature Grans, Absolute 0.03 10*3/mm3     Influenza Antigen, Rapid - Swab, Nasopharynx [[id number]]  (Normal) Collected:  01/10/18 0026    Specimen:  Swab from Nasopharynx Updated:  01/10/18 0050     Influenza A Ag, EIA Negative     Influenza B Ag, EIA Negative    Urinalysis With / Culture If Indicated - Urine, Clean Catch [[id number]]  (Abnormal)  Collected:  01/10/18 0026    Specimen:  Urine from Urine, Clean Catch Updated:  01/10/18 0043     Color, UA Yellow     Appearance, UA Clear     pH, UA 7.5     Specific Gravity, UA 1.011     Glucose, UA Negative     Ketones, UA Negative     Bilirubin, UA Negative     Blood, UA Small (1+) (A)     Protein, UA Negative     Leuk Esterase, UA Negative     Nitrite, UA Negative     Urobilinogen, UA 0.2 E.U./dL    Urinalysis, Microscopic Only - Urine, Clean Catch [[id number]]  (Abnormal) Collected:  01/10/18 0026    Specimen:  Urine from Urine, Clean Catch Updated:  01/10/18 0044     RBC, UA 13-20 (A) /HPF      WBC, UA 0-2 /HPF      Bacteria, UA None Seen /HPF      Squamous Epithelial Cells, UA 0-2 /HPF      Hyaline Casts, UA None Seen /LPF      Methodology Automated Microscopy          I ordered the above labs and reviewed the results    RADIOLOGY  CT Head Without Contrast   Preliminary Result   No definite acute intracranial pathology. Overall no significant change.                  XR Chest 2 View   Preliminary Result   No acute findings.                   I ordered the above noted radiological studies. Interpreted by radiologist.  Reviewed by me in PACS.       PROCEDURES  Procedures      PROGRESS AND CONSULTS  ED Course     23:42  Troponin, UA, APTT, pt/inr, CMP, and CBC ordered.     23:52  BP- (!) 192/115 HR- 96 Temp- 99.1 °F (37.3 °C) (Tympanic) O2 sat- 97%  Advised pt and family of the plan for labs. Will treat hypertension. Pt understands and agrees with the plan, all questions answered.    23:55  UA, flu swab, CXR, and CT head ordered. IVF ordered for hydration. Labetalol ordered to treat hypertension.     01:51  BP- 172/99 HR- 80 Temp- 99.1 °F (37.3 °C) (Tympanic) O2 sat- 95%  Rechecked the patient who is in NAD and is resting comfortably. Pt leaning to the left while sitting and was off balance while standing. Advised pt and family that the CT head, CXR, and labs show NAD. Informed them of the plan for admission for  further evaluation. Pt understands and agrees with the plan, all questions answered.    02:32  Call placed to [facility] for admission.     03:10  Discussed pt's case with Dr Gong ([facility]), who agrees to admit the pt.     MEDICAL DECISION MAKING  Results were reviewed/discussed with the patient and they were also made aware of online access. Pt also made aware that some labs, such as cultures, will not be resulted during ER visit and follow up with PMD is necessary.     MDM  Number of Diagnoses or Management Options  Dizzy:   Essential hypertension:   Seizure:      Amount and/or Complexity of Data Reviewed  Clinical lab tests: reviewed and ordered (Troponin is <0.010)  Tests in the radiology section of CPT®: ordered and reviewed (CXR shows NAD. CT head shows NAD)  Discuss the patient with other providers: yes (Dr Gong, [facility])           DIAGNOSIS  Final diagnoses:   Seizure   Essential hypertension   Dizzy       DISPOSITION  ADMISSION    Discussed treatment plan and reason for admission with pt/family and admitting physician.  Pt/family voiced understanding of the plan for admission for further testing/treatment as needed.     Latest Documented Vital Signs:  As of 7:15 AM  BP- (!) 196/116 HR- 80 Temp- 99.1 °F (37.3 °C) (Tympanic) O2 sat- 95%    --  Documentation assistance provided by janett Crook for Dr Ahumada.  Information recorded by the scrbrisa was done at my direction and has been verified and validated by me.        Carey Crook  01/10/18 0311       Venkat Ahumada MD  01/10/18 2103

## 2018-01-10 NOTE — CONSULTS
NEUROLOGY CONSULTATION        PATIENT Gabo Boo    1940   MRN [id number]   ADMIT DATE 2018   LENGTH OF STAY 0 days   ATTENDING James Gong MD   Historian Patient and Wife @ bedside     HISTORY OF PRESENT ILLNESS:  This is a 78 yo gentleman with history of HTN,hyperlipidemia, prediabetes and seizure who presented to the ED 2018 with complaint of stumbled walking with confusion. Wife reports that throughout the date of the admission the patient appeared off. He didn't respond to wife's requests as he typically does, he was repetitiously pressing the remote and later attempted the slice a kitchen times with a knife thinking it was an apple. Additionally the wife reports that the patient has been leaning to the left. During these events the patient was able to carry on a conversation and had what wife thought to be bilateral hand tremors. Wife and pt. deny urinary incontinence and a postictal state. Wife says that all these events combined along with a stumbled gait is what finally made he bring her  to the ER. Patient has memory of some but not all events.   PTA patient was been treated for URI symptoms with ZPAK. Patient has known history of seizure and was diagnosed with seizures disorder 6 years ago and has had no other events over the past few years with only weaning medication titrations. Past seizure events six years ago were similar to this one. Although wife reports this event to be milder than the last.      Today patients cardiac rhythm was A-fib with RVR (140s) ,which is new to him, and a cardizem drip (w/o bolus) was initiated.    Plans to transfer patient to ICU d/t rate/rhythm issues.        CHIEF COMPLAINT: Hypertension; Fatigue; and Seizures      DIAGNOSIS: Seizure [R56.9]  Seizure [R56.9]      PAST MEDICAL HISTORY:   Past Medical History:   Diagnosis Date   • Abnormal barium swallow 2016    HH, reflux   • Anxiety    • GERD (gastroesophageal reflux disease)    •  H/O cataract     Dr Heron Hoffman   • Hiatal hernia    • Hyperlipidemia    • Hypertension    • Internal hemorrhoids    • Seizures     July 2013 was last seizure   • Tubular adenoma of colon        PAST SURGICAL HISTORY:  Past Surgical History:   Procedure Laterality Date   • COLONOSCOPY  04/23/2013    Dr Galeano/[facility]ont[facility], [facility], tubular adenoma w/low grade dysplasia   • ENDOSCOPY N/A 12/16/2016    Procedure: ESOPHAGOGASTRODUODENOSCOPY WITH COLD BIOPSIES AND 54 FR KENNY DILATATION;  Surgeon: Shiraz Galeano MD;  Location: [facility] ENDOSCOPY;  Service:    • EYE SURGERY     • HERNIA REPAIR     • TONSILLECTOMY         CURRENT MEDICATIONS:  Scheduled Medications:    amLODIPine 10 mg Oral Q24H   aspirin 81 mg Oral Daily   hydrochlorothiazide 25 mg Oral Daily   isosorbide mononitrate 60 mg Oral Q24H   lacosamide 100 mg Oral Q12H   losartan 100 mg Oral Q24H   metoprolol succinate XL 50 mg Oral Q24H   mirtazapine 15 mg Oral Nightly   pantoprazole 40 mg Oral QAM     Infusions:     diltiaZEM 5-15 mg/hr Last Rate: Stopped (01/10/18 1400)   sodium chloride 0.9 % with KCl 20 mEq 75 mL/hr Last Rate: 75 mL/hr (01/10/18 1430)     PRN Medications:  clonazePAM  •  hydrALAZINE  •  Insert peripheral IV **AND** sodium chloride  •  zolpidem    SOCIAL HISTORY:  Social History     Social History   • Marital status:      Spouse name: N/A   • Number of children: N/A   • Years of education: N/A     Social History Main Topics   • Smoking status: Never Smoker   • Smokeless tobacco: None   • Alcohol use 1.2 oz/week     1 Cans of beer, 1 Shots of liquor per week      Comment: daily   • Drug use: No   • Sexual activity: Not Asked     Other Topics Concern   • None     Social History Narrative       FAMILY HISTORY:   I have reviewed this patient's family history and it is not significant to the present illness.      REVIEW OF SYSTEMS  General constitutional symptoms:  Appropriate for stated age. Denies any fever, chills, weakness, sweating or  "heat/cold tolerance. No recent weigh gain or weight loss.   Skin, Hair, and Nails.  Patient denies any wounds, rash, jaundice, or purititis. Well groomed.   Head and Neck. Patient with no complaints of headache. Patient denies any head or neck injury   Eyes. No visual changes, eye pain, swelling, discharge, tearing, or complaints. Last eye exam:   Nose and Sinuses. (+) URI symptoms;lZpak initiated PTA. Patient denies change in sense of smell, epistaxis, dry nose. Patient denies sinus tenderness.   Cardiovascular. Patient denies and CPor edema. Able to perform ADL’s w/o getting SOA or CP.   Chest and Lungs. Denies any SOA or pain with inspiration, cough, wheezing or hemoptysis.   Endocrine. Patient has no history of and denies any changes in thyroid, skin, hair, or temperature preference. Pt. denies polydipsia, polyphagia, or polyuria. No unexplained weight gains or loss.   Hematologic. Patient denies bleeding, excess bruising, anemia, or excessive fatigue.   Lymphatic. Denies any lymphadenopathy.   Musculoskeletal.  Patient denies any muscle or joint pain or muscle weakness. Performs ADLs w/o limitations.   Neurological:  Denies any fainting, blackouts,paralysis, numbness or loss of sensation, or tremors.          PHYSICAL EXAM:  GENERAL: Reveals a man who appears his stated age, in no acute distress.  VITAL SIGNS: /79 (BP Location: Right arm, Patient Position: Lying)  Pulse 60  Temp (!) 100.7 °F (38.2 °C) (Tympanic)   Resp 18  Ht 177.8 cm (70\")  Wt 96.6 kg (213 lb)  SpO2 92%  BMI 30.56 kg/m2  NECK: Carotids are equal without bruits.   HEART: Regular rate and rhythm with no significant murmur or gallop. Afib(130s) has converted to NSR (60s)   EXTREMITIES: Nonedematous. Pulses are intact. There is no rash.  No respiratory distress. There are no signs of cutaneous embolization.  NEUROLOGIC: Awake, alert and oriented x3. There is no aphasia or dysarthria.  Patient can do simple calculations and remembers " two out of three objects in five minutes. Visual fields are full. Disks are flat. Cranial nerves II through XII are intact. Power is normal in all 4 extremities. Tone is normal. Toes are downgoing. Sensations intact pinprick and joint position sense in all 4 extremities. Cerebellar function and gait are normal. No evidence of tongue biting.    Non-focal exam     NIHSS   0-->Alert: keenly responsive  0-->Answers both questions correctly  0-->Performs both tasks correctly  0=normal  0=Partial hemianopia  0=Normal symmetric movement  0-->No drift: limb holds 90 (or 45) degrees for full 10 secs  0-->No drift: limb holds 90 (or 45) degrees for full 10 secs  0-->No drift: limb holds 90 (or 45) degrees for full 10 secs  0-->No drift: limb holds 90 (or 45) degrees for full 10 secs  0=Absent  0=Normal; no sensory loss  0=No aphasia, normal  0=Normal  0=No abnormality    Total score: 0    DIAGNOSTIC DATA:     Lab Results   Component Value Date    WBC 9.83 01/09/2018    HGB 14.0 01/09/2018    HCT 41.2 01/09/2018    MCV 93.4 01/09/2018     01/09/2018     Lab Results   Component Value Date    GLUCOSE 112 (H) 01/09/2018    BUN 18 01/09/2018    CREATININE 1.15 01/09/2018    EGFRIFNONA 62 01/09/2018    EGFRIFAFRI 83 06/20/2016    BCR 15.7 01/09/2018    K 3.5 01/09/2018    CO2 27.0 01/09/2018    CALCIUM 9.3 01/09/2018    PROTENTOTREF 6.3 06/20/2016    ALBUMIN 4.40 01/09/2018    LABIL2 1.4 01/09/2018    AST 34 01/09/2018    ALT 26 01/09/2018     No results found for: TSH   Lab Results   Component Value Date    CKTOTAL 66 12/02/2015    TROPONINT 0.010 01/10/2018     .  .  Results for orders placed or performed during the hospital encounter of 01/09/18   CT Head Without Contrast    Narrative    CT SCAN OF THE BRAIN WITHOUT CONTRAST.     TECHNIQUE: Radiation dose reduction techniques were utilized, including  automated exposure control and exposure modulation based on body size.  Multiple axial images of the brain were obtained  from the vertex to the  base of the brain.     HISTORY: Altered mental status. Headache.     COMPARISON: 05/30/2016.     FINDINGS:   Midline structures are within normal limits, there is no hydrocephalus.  Gray-white matter differentiation is maintained. Findings of small  vessel ischemic disease, a few old lacunar infarcts and cortical  atrophy. 1.2 cm hygroma layers the right cerebral hemisphere, no  significant change.     Orbits are within normal limits. No significant mucosal disease of the  para-nasal sinuses. Mastoid air cells are well aerated.              Impression    No definite acute intracranial pathology. Overall no significant change.              MRI reviewed personally by Dr. Garcia; Negative for acute pathology. No pituitarytumor noted. Agree with radiology impressions. We belive that this event is a seizure      IMPRESSION: This is a 76 yo gentleman with HTN,hyperlipidemia, prediabetes and seizure d/o who presented to the ED 01/09/2018 with complaint of stumbled gait, confusion and repetitive movements of hands consistent with automatisms that occurred with previous Sz.  Based on similarities of seizures 6 years ago and amnesia of some of the events, we feel that this event is most likely due to a seizure. With the new-onset of a-fib and unknown cause for neurological event stroke cannot excluded; although unlikely given the negative MRI.     Plan:   Stroke Order-set   Aspirin already ordered   TTE already ordered   CTA Head and Neck   EEG   Increase Vimpat to 150mg po BID         Thank you for allowing me to see this patient.    Camila Lacy, INA  1/10/2018  4:49 PM

## 2018-01-10 NOTE — CONSULTS
"[facility]  Cardiology Consult Note    Patient Identification:  Name: Gabo Boo  Age: 77 y.o.  Sex: male  :  1940  MRN: [id number]             Requesting Physician: Dr Gong    Reason for Consultation / Chief Complaint :afib rvr - ?duration,  HTN,     History of Present Illness:     77yr old male with no documented h/o CAD, arrhythmia. (However, home meds include Isosorbide)  Known  HTN, seizure disorder, esophageal strictures requiring dilatation and GERD who presents to ER with spouse reporting onset of  \"shaking spells\", stumbling and altered mental status last pm. Pt's wife states that after the pt became altered, she gave him his Klonopin and Vimpat. At this time, pt denies pain, SOA, ot any other sx. His wife states that the pt has been on a Zpak s/t URI sx (including non productive cough. BP on arrival to ED  220/120 was treated with Labetalol. Admission EKG showed atrial fib with rvr    He was awake, and alert upon arrival to ED.Denied chest pain and SOA. Head CT showed nothing acute.CXR unremarkable.        Comorbid cardiac risk factors: HTN, dyslipidemia    Past Medical History:  Past Medical History:   Diagnosis Date   • Abnormal barium swallow 2016    HH, reflux   • Anxiety    • GERD (gastroesophageal reflux disease)    • H/O cataract     Dr Heron Hoffman   • Hiatal hernia    • Hyperlipidemia    • Hypertension    • Internal hemorrhoids    • Seizures     2013 was last seizure   • Tubular adenoma of colon      Past Surgical History:  Past Surgical History:   Procedure Laterality Date   • COLONOSCOPY  2013    Dr Galeano/[facility], , tubular adenoma w/low grade dysplasia   • ENDOSCOPY N/A 2016    Procedure: ESOPHAGOGASTRODUODENOSCOPY WITH COLD BIOPSIES AND 54 FR KENNY DILATATION;  Surgeon: Shiraz Galeano MD;  Location: [facility] ENDOSCOPY;  Service:    • EYE SURGERY     • HERNIA REPAIR     • TONSILLECTOMY        Allergies:  No Known Allergies  Home " "Meds:    (Not in a hospital admission)  Current Meds:   [unfilled]  Social History:   Social History   Substance Use Topics   • Smoking status: Never Smoker   • Smokeless tobacco: Not on file   • Alcohol use 1.2 oz/week     1 Cans of beer, 1 Shots of liquor per week      Comment: daily      Family History:  Family History   Problem Relation Age of Onset   • Stroke Father         Review of Systems   Review of Systems  Constitutional: No wt loss, fever   Gastrointestinal: No nausea , abdominal pain  Behavioral/Psych: No insomnia or anxiety  Cardiovascular ----positive for  PALPITATION. All other systems reviewed and are negative        Constitutional:  Temp:  [99.1 °F (37.3 °C)-100.9 °F (38.3 °C)] 100.7 °F (38.2 °C)  Heart Rate:  [] 131  Resp:  [16-18] 18  BP: (172-220)/() 202/124    Physical Exam              Physical Exam  /78  Pulse 64  Temp 98 °F (36.7 °C) (Oral)   Resp 18  Ht 177.8 cm (70\")  Wt 101 kg (223 lb 5.2 oz)  SpO2 94%  BMI 32.04 kg/m2    General appearance: NAD, conversant   Eyes: anicteric sclerae, moist conjunctivae; no lid-lag; PERRLA   HENT: Atraumatic; oropharynx clear with moist mucous membranes and no mucosal ulcerations;  normal hard and soft palate   Neck: Trachea midline; FROM, supple, no thyromegaly or lymphadenopathy   Lungs: CTA, with normal respiratory effort and no intercostal retractions   CV: S1-S2 regular, no murmurs, no rub, no gallop   Abdomen: Soft, non-tender; no masses or HSM   Extremities: No peripheral edema or extremity lymphadenopathy  Skin: Normal temperature, turgor and texture; no rash, ulcers or subcutaneous nodules   Psych: Appropriate affect, alert and oriented to person, place and time             Cardiographics  ECG:     Telemetry:  afib rvr    Echocardiogram: pending    Imaging  Chest X-ray FINDINGS:  Cardiomediastinal silhouette is within normal limits.      There is no consolidation or effusion. Lung volumes are low.    CT Head   No definite " acute intracranial pathology. Overall no significant change.       Lab Review     Results from last 7 days  Lab Units 01/10/18  1036 01/09/18  2349   TROPONIN T ng/mL 0.010 <0.010           Results from last 7 days  Lab Units 01/09/18  2349   SODIUM mmol/L 136   POTASSIUM mmol/L 3.5   BUN mg/dL 18   CREATININE mg/dL 1.15   CALCIUM mg/dL 9.3       Results from last 7 days  Lab Units 01/09/18  2349   WBC 10*3/mm3 9.83   HEMOGLOBIN g/dL 14.0   HEMATOCRIT % 41.2   PLATELETS 10*3/mm3 192       Results from last 7 days  Lab Units 01/09/18  2349   INR  1.00   APTT seconds 27.5         Assessment:  - atrial fib rvr - ?duration  - accelerated HTN  - s/p muscle and mental status changes  - h/o seizures    Recommendations / Plan:     Cardizem gtt. Rule out MI. Check ECHO.  Cont Cozaar, Lopressor and Norvasc.He has a [id number] of >/=2. Continue low dpse aspirin until cleared by Neuro to increase to full strength. Will need further ischemic work up once medically stable    IV LOPRESSOR FOR AFIB AND HIGH BP  Labs/tests ordered: EKG, ECHO, CE    I reviewed the patient's clinical results, medications and treatment plan arnav. I independently/personally viewed and interpreted the patient's radiographic/laboratory/ EKG/Telemetry data    Colin Escoto MD  1/10/2018, 12:10 PM      EMR Dragon/Transcription:   Dictated utilizing Dragon dictation

## 2018-01-10 NOTE — ED NOTES
RN spoke with Farideh, consult and page and troponin orders placed.       Narda Fernando RN  01/10/18 1538

## 2018-01-10 NOTE — ED NOTES
PT sleeping, breakfast tray provided for when patient wakes up.      Narda Fernando RN  01/10/18 0859

## 2018-01-11 ENCOUNTER — APPOINTMENT (OUTPATIENT)
Dept: CT IMAGING | Facility: HOSPITAL | Age: 78
End: 2018-01-11
Attending: PSYCHIATRY & NEUROLOGY

## 2018-01-11 ENCOUNTER — APPOINTMENT (OUTPATIENT)
Dept: NEUROLOGY | Facility: HOSPITAL | Age: 78
End: 2018-01-11
Attending: INTERNAL MEDICINE

## 2018-01-11 ENCOUNTER — APPOINTMENT (OUTPATIENT)
Dept: CARDIOLOGY | Facility: HOSPITAL | Age: 78
End: 2018-01-11
Attending: PSYCHIATRY & NEUROLOGY

## 2018-01-11 LAB
ALBUMIN SERPL-MCNC: 3.6 G/DL (ref 3.5–5.2)
ALBUMIN/GLOB SERPL: 1.4 G/DL
ALP SERPL-CCNC: 73 U/L (ref 39–117)
ALT SERPL W P-5'-P-CCNC: 23 U/L (ref 1–41)
ANION GAP SERPL CALCULATED.3IONS-SCNC: 8 MMOL/L
AST SERPL-CCNC: 42 U/L (ref 1–40)
B PERT DNA SPEC QL NAA+PROBE: NOT DETECTED
BASOPHILS # BLD AUTO: 0.02 10*3/MM3 (ref 0–0.2)
BASOPHILS NFR BLD AUTO: 0.3 % (ref 0–1.5)
BH CV ECHO MEAS - ACS: 1.9 CM
BH CV ECHO MEAS - AO MAX PG: 2 MMHG
BH CV ECHO MEAS - AO MEAN PG (FULL): 0 MMHG
BH CV ECHO MEAS - AO MEAN PG: 1 MMHG
BH CV ECHO MEAS - AO ROOT AREA (BSA CORRECTED): 1.7
BH CV ECHO MEAS - AO ROOT AREA: 7.5 CM^2
BH CV ECHO MEAS - AO ROOT DIAM: 3.1 CM
BH CV ECHO MEAS - AO V2 MAX: 77 CM/SEC
BH CV ECHO MEAS - AO V2 MEAN: 56.7 CM/SEC
BH CV ECHO MEAS - AO V2 VTI: 20.2 CM
BH CV ECHO MEAS - AVA(I,A): 3.5 CM^2
BH CV ECHO MEAS - AVA(I,D): 3.5 CM^2
BH CV ECHO MEAS - BSA(HAYCOCK): 1.9 M^2
BH CV ECHO MEAS - BSA: 1.9 M^2
BH CV ECHO MEAS - BZI_BMI: 24.5 KILOGRAMS/M^2
BH CV ECHO MEAS - BZI_METRIC_HEIGHT: 172.7 CM
BH CV ECHO MEAS - BZI_METRIC_WEIGHT: 73 KG
BH CV ECHO MEAS - CONTRAST EF (2CH): 68.9 ML/M^2
BH CV ECHO MEAS - CONTRAST EF 4CH: 69.9 ML/M^2
BH CV ECHO MEAS - EDV(CUBED): 117.6 ML
BH CV ECHO MEAS - EDV(MOD-SP2): 90 ML
BH CV ECHO MEAS - EDV(MOD-SP4): 153 ML
BH CV ECHO MEAS - EDV(TEICH): 112.8 ML
BH CV ECHO MEAS - EF(CUBED): 63.6 %
BH CV ECHO MEAS - EF(MOD-SP2): 68.9 %
BH CV ECHO MEAS - EF(MOD-SP4): 69.9 %
BH CV ECHO MEAS - EF(TEICH): 54.9 %
BH CV ECHO MEAS - ESV(CUBED): 42.9 ML
BH CV ECHO MEAS - ESV(MOD-SP2): 28 ML
BH CV ECHO MEAS - ESV(MOD-SP4): 46 ML
BH CV ECHO MEAS - ESV(TEICH): 50.9 ML
BH CV ECHO MEAS - FS: 28.6 %
BH CV ECHO MEAS - IVS/LVPW: 1
BH CV ECHO MEAS - IVSD: 1.2 CM
BH CV ECHO MEAS - LAT PEAK E' VEL: 9 CM/SEC
BH CV ECHO MEAS - LV DIASTOLIC VOL/BSA (35-75): 82.1 ML/M^2
BH CV ECHO MEAS - LV MASS(C)D: 226.4 GRAMS
BH CV ECHO MEAS - LV MASS(C)DI: 121.5 GRAMS/M^2
BH CV ECHO MEAS - LV MEAN PG: 1 MMHG
BH CV ECHO MEAS - LV SYSTOLIC VOL/BSA (12-30): 24.7 ML/M^2
BH CV ECHO MEAS - LV V1 MEAN: 51 CM/SEC
BH CV ECHO MEAS - LV V1 VTI: 20.6 CM
BH CV ECHO MEAS - LVIDD: 4.9 CM
BH CV ECHO MEAS - LVIDS: 3.5 CM
BH CV ECHO MEAS - LVLD AP2: 8.4 CM
BH CV ECHO MEAS - LVLD AP4: 8.7 CM
BH CV ECHO MEAS - LVLS AP2: 6.6 CM
BH CV ECHO MEAS - LVLS AP4: 7.1 CM
BH CV ECHO MEAS - LVOT AREA (M): 3.5 CM^2
BH CV ECHO MEAS - LVOT AREA: 3.5 CM^2
BH CV ECHO MEAS - LVOT DIAM: 2.1 CM
BH CV ECHO MEAS - LVPWD: 1.2 CM
BH CV ECHO MEAS - MED PEAK E' VEL: 8 CM/SEC
BH CV ECHO MEAS - MV A DUR: 148 SEC
BH CV ECHO MEAS - MV A MAX VEL: 65.2 CM/SEC
BH CV ECHO MEAS - MV DEC SLOPE: 442 CM/SEC^2
BH CV ECHO MEAS - MV DEC TIME: 264 SEC
BH CV ECHO MEAS - MV E MAX VEL: 79.5 CM/SEC
BH CV ECHO MEAS - MV E/A: 1.2
BH CV ECHO MEAS - MV MEAN PG: 1 MMHG
BH CV ECHO MEAS - MV P1/2T MAX VEL: 107 CM/SEC
BH CV ECHO MEAS - MV P1/2T: 70.9 MSEC
BH CV ECHO MEAS - MV V2 MEAN: 55.4 CM/SEC
BH CV ECHO MEAS - MV V2 VTI: 33.9 CM
BH CV ECHO MEAS - MVA P1/2T LCG: 2.1 CM^2
BH CV ECHO MEAS - MVA(P1/2T): 3.1 CM^2
BH CV ECHO MEAS - MVA(VTI): 2.1 CM^2
BH CV ECHO MEAS - PA ACC SLOPE: 675 CM/SEC^2
BH CV ECHO MEAS - PA ACC TIME: 0.11 SEC
BH CV ECHO MEAS - PA MAX PG (FULL): 0.74 MMHG
BH CV ECHO MEAS - PA MAX PG: 2.4 MMHG
BH CV ECHO MEAS - PA PR(ACCEL): 28.2 MMHG
BH CV ECHO MEAS - PA V2 MAX: 78.2 CM/SEC
BH CV ECHO MEAS - PULM A REVS DUR: 95 SEC
BH CV ECHO MEAS - PULM A REVS VEL: 26 CM/SEC
BH CV ECHO MEAS - PULM DIAS VEL: 40.3 CM/SEC
BH CV ECHO MEAS - PULM S/D: 1.3
BH CV ECHO MEAS - PULM SYS VEL: 51.5 CM/SEC
BH CV ECHO MEAS - PVA(V,A): 3.5 CM^2
BH CV ECHO MEAS - PVA(V,D): 3.5 CM^2
BH CV ECHO MEAS - QP/QS: 0.98
BH CV ECHO MEAS - RAP SYSTOLE: 15 MMHG
BH CV ECHO MEAS - RV MAX PG: 1.7 MMHG
BH CV ECHO MEAS - RV MEAN PG: 1 MMHG
BH CV ECHO MEAS - RV V1 MAX: 65.4 CM/SEC
BH CV ECHO MEAS - RV V1 MEAN: 49.1 CM/SEC
BH CV ECHO MEAS - RV V1 VTI: 16.9 CM
BH CV ECHO MEAS - RVOT AREA: 4.2 CM^2
BH CV ECHO MEAS - RVOT DIAM: 2.3 CM
BH CV ECHO MEAS - RVSP: 32.3 MMHG
BH CV ECHO MEAS - SI(AO): 81.8 ML/M^2
BH CV ECHO MEAS - SI(CUBED): 40.1 ML/M^2
BH CV ECHO MEAS - SI(LVOT): 38.3 ML/M^2
BH CV ECHO MEAS - SI(MOD-SP2): 33.3 ML/M^2
BH CV ECHO MEAS - SI(MOD-SP4): 57.4 ML/M^2
BH CV ECHO MEAS - SI(TEICH): 33.2 ML/M^2
BH CV ECHO MEAS - SV(AO): 152.5 ML
BH CV ECHO MEAS - SV(CUBED): 74.8 ML
BH CV ECHO MEAS - SV(LVOT): 71.4 ML
BH CV ECHO MEAS - SV(MOD-SP2): 62 ML
BH CV ECHO MEAS - SV(MOD-SP4): 107 ML
BH CV ECHO MEAS - SV(RVOT): 70.2 ML
BH CV ECHO MEAS - SV(TEICH): 61.9 ML
BH CV ECHO MEAS - TAPSE (>1.6): 2 CM2
BH CV ECHO MEAS - TR MAX VEL: 208 CM/SEC
BH CV VAS BP RIGHT ARM: NORMAL MMHG
BH CV XLRA - RV BASE: 3.8 CM
BH CV XLRA - TDI S': 11 CM/SEC
BH CV XLRA MEAS LEFT DIST CCA EDV: -20.5 CM/SEC
BH CV XLRA MEAS LEFT DIST CCA PSV: -79.7 CM/SEC
BH CV XLRA MEAS LEFT DIST ICA EDV: -10 CM/SEC
BH CV XLRA MEAS LEFT DIST ICA PSV: -33.4 CM/SEC
BH CV XLRA MEAS LEFT MID ICA EDV: -14.1 CM/SEC
BH CV XLRA MEAS LEFT MID ICA PSV: -40.5 CM/SEC
BH CV XLRA MEAS LEFT PROX CCA EDV: 14.7 CM/SEC
BH CV XLRA MEAS LEFT PROX CCA PSV: 77.4 CM/SEC
BH CV XLRA MEAS LEFT PROX ECA EDV: -20.6 CM/SEC
BH CV XLRA MEAS LEFT PROX ECA PSV: -145 CM/SEC
BH CV XLRA MEAS LEFT PROX ICA EDV: -15.3 CM/SEC
BH CV XLRA MEAS LEFT PROX ICA PSV: -44.8 CM/SEC
BH CV XLRA MEAS LEFT PROX SCLA EDV: 9.4 CM/SEC
BH CV XLRA MEAS LEFT PROX SCLA PSV: 89.7 CM/SEC
BH CV XLRA MEAS LEFT VERTEBRAL A EDV: 14.9 CM/SEC
BH CV XLRA MEAS LEFT VERTEBRAL A PSV: 36.5 CM/SEC
BH CV XLRA MEAS RIGHT DIST CCA EDV: 11.4 CM/SEC
BH CV XLRA MEAS RIGHT DIST CCA PSV: 53.4 CM/SEC
BH CV XLRA MEAS RIGHT DIST ICA EDV: -7.9 CM/SEC
BH CV XLRA MEAS RIGHT DIST ICA PSV: -24.2 CM/SEC
BH CV XLRA MEAS RIGHT MID ICA EDV: 13 CM/SEC
BH CV XLRA MEAS RIGHT MID ICA PSV: 38.5 CM/SEC
BH CV XLRA MEAS RIGHT PROX CCA EDV: -16.4 CM/SEC
BH CV XLRA MEAS RIGHT PROX CCA PSV: -69.8 CM/SEC
BH CV XLRA MEAS RIGHT PROX ECA EDV: -14.1 CM/SEC
BH CV XLRA MEAS RIGHT PROX ECA PSV: -120 CM/SEC
BH CV XLRA MEAS RIGHT PROX ICA EDV: -13.8 CM/SEC
BH CV XLRA MEAS RIGHT PROX ICA PSV: -47.1 CM/SEC
BH CV XLRA MEAS RIGHT PROX SCLA PSV: 79.7 CM/SEC
BH CV XLRA MEAS RIGHT VERTEBRAL A EDV: -9.8 CM/SEC
BH CV XLRA MEAS RIGHT VERTEBRAL A PSV: -28.5 CM/SEC
BILIRUB SERPL-MCNC: 0.6 MG/DL (ref 0.1–1.2)
BUN BLD-MCNC: 16 MG/DL (ref 8–23)
BUN/CREAT SERPL: 18 (ref 7–25)
C PNEUM DNA NPH QL NAA+NON-PROBE: NOT DETECTED
CALCIUM SPEC-SCNC: 7.7 MG/DL (ref 8.6–10.5)
CHLORIDE SERPL-SCNC: 92 MMOL/L (ref 98–107)
CHOLEST SERPL-MCNC: 129 MG/DL (ref 0–200)
CO2 SERPL-SCNC: 30 MMOL/L (ref 22–29)
CREAT BLD-MCNC: 0.89 MG/DL (ref 0.76–1.27)
DEPRECATED RDW RBC AUTO: 43 FL (ref 37–54)
E/E' RATIO: 10
EOSINOPHIL # BLD AUTO: 0.01 10*3/MM3 (ref 0–0.7)
EOSINOPHIL NFR BLD AUTO: 0.2 % (ref 0.3–6.2)
ERYTHROCYTE [DISTWIDTH] IN BLOOD BY AUTOMATED COUNT: 12.7 % (ref 11.5–14.5)
FLUAV H1 2009 PAND RNA NPH QL NAA+PROBE: NOT DETECTED
FLUAV H1 HA GENE NPH QL NAA+PROBE: NOT DETECTED
FLUAV H3 RNA NPH QL NAA+PROBE: DETECTED
FLUAV SUBTYP SPEC NAA+PROBE: NOT DETECTED
FLUBV RNA ISLT QL NAA+PROBE: NOT DETECTED
GFR SERPL CREATININE-BSD FRML MDRD: 83 ML/MIN/1.73
GLOBULIN UR ELPH-MCNC: 2.5 GM/DL
GLUCOSE BLD-MCNC: 113 MG/DL (ref 65–99)
GLUCOSE BLDC GLUCOMTR-MCNC: 127 MG/DL (ref 70–130)
HADV DNA SPEC NAA+PROBE: NOT DETECTED
HBA1C MFR BLD: 5.3 % (ref 4.8–5.6)
HCOV 229E RNA SPEC QL NAA+PROBE: NOT DETECTED
HCOV HKU1 RNA SPEC QL NAA+PROBE: NOT DETECTED
HCOV NL63 RNA SPEC QL NAA+PROBE: NOT DETECTED
HCOV OC43 RNA SPEC QL NAA+PROBE: NOT DETECTED
HCT VFR BLD AUTO: 33.8 % (ref 40.4–52.2)
HDLC SERPL-MCNC: 48 MG/DL (ref 40–60)
HGB BLD-MCNC: 11.5 G/DL (ref 13.7–17.6)
HMPV RNA NPH QL NAA+NON-PROBE: NOT DETECTED
HPIV1 RNA SPEC QL NAA+PROBE: NOT DETECTED
HPIV2 RNA SPEC QL NAA+PROBE: NOT DETECTED
HPIV3 RNA NPH QL NAA+PROBE: NOT DETECTED
HPIV4 P GENE NPH QL NAA+PROBE: NOT DETECTED
IMM GRANULOCYTES # BLD: 0.02 10*3/MM3 (ref 0–0.03)
IMM GRANULOCYTES NFR BLD: 0.3 % (ref 0–0.5)
LDLC SERPL CALC-MCNC: 74 MG/DL (ref 0–100)
LDLC/HDLC SERPL: 1.54 {RATIO}
LEFT ARM BP: NORMAL MMHG
LEFT ATRIUM VOLUME INDEX: 30 ML/M2
LYMPHOCYTES # BLD AUTO: 0.48 10*3/MM3 (ref 0.9–4.8)
LYMPHOCYTES NFR BLD AUTO: 8.3 % (ref 19.6–45.3)
M PNEUMO IGG SER IA-ACNC: NOT DETECTED
MCH RBC QN AUTO: 31.3 PG (ref 27–32.7)
MCHC RBC AUTO-ENTMCNC: 34 G/DL (ref 32.6–36.4)
MCV RBC AUTO: 91.8 FL (ref 79.8–96.2)
MONOCYTES # BLD AUTO: 0.7 10*3/MM3 (ref 0.2–1.2)
MONOCYTES NFR BLD AUTO: 12.1 % (ref 5–12)
NEUTROPHILS # BLD AUTO: 4.56 10*3/MM3 (ref 1.9–8.1)
NEUTROPHILS NFR BLD AUTO: 78.8 % (ref 42.7–76)
NT-PROBNP SERPL-MCNC: 1022 PG/ML (ref 0–1800)
PLATELET # BLD AUTO: 145 10*3/MM3 (ref 140–500)
PMV BLD AUTO: 10.5 FL (ref 6–12)
POTASSIUM BLD-SCNC: 3.1 MMOL/L (ref 3.5–5.2)
POTASSIUM BLD-SCNC: 3.8 MMOL/L (ref 3.5–5.2)
PROT SERPL-MCNC: 6.1 G/DL (ref 6–8.5)
RBC # BLD AUTO: 3.68 10*6/MM3 (ref 4.6–6)
RHINOVIRUS RNA SPEC NAA+PROBE: NOT DETECTED
RIGHT ARM BP: NORMAL MMHG
RSV RNA NPH QL NAA+NON-PROBE: NOT DETECTED
SODIUM BLD-SCNC: 130 MMOL/L (ref 136–145)
SODIUM BLD-SCNC: 131 MMOL/L (ref 136–145)
TRIGL SERPL-MCNC: 35 MG/DL (ref 0–150)
TROPONIN T SERPL-MCNC: 0.01 NG/ML (ref 0–0.03)
TROPONIN T SERPL-MCNC: 0.01 NG/ML (ref 0–0.03)
TSH SERPL DL<=0.05 MIU/L-ACNC: 0.58 MIU/ML (ref 0.27–4.2)
VLDLC SERPL-MCNC: 7 MG/DL (ref 5–40)
WBC NRBC COR # BLD: 5.79 10*3/MM3 (ref 4.5–10.7)

## 2018-01-11 PROCEDURE — 84132 ASSAY OF SERUM POTASSIUM: CPT | Performed by: HOSPITALIST

## 2018-01-11 PROCEDURE — 83036 HEMOGLOBIN GLYCOSYLATED A1C: CPT | Performed by: HOSPITALIST

## 2018-01-11 PROCEDURE — 82962 GLUCOSE BLOOD TEST: CPT

## 2018-01-11 PROCEDURE — 83880 ASSAY OF NATRIURETIC PEPTIDE: CPT | Performed by: HOSPITALIST

## 2018-01-11 PROCEDURE — 93880 EXTRACRANIAL BILAT STUDY: CPT

## 2018-01-11 PROCEDURE — 95816 EEG AWAKE AND DROWSY: CPT | Performed by: PSYCHIATRY & NEUROLOGY

## 2018-01-11 PROCEDURE — 63710000001 PREDNISONE PER 1 MG: Performed by: INTERNAL MEDICINE

## 2018-01-11 PROCEDURE — 84443 ASSAY THYROID STIM HORMONE: CPT | Performed by: HOSPITALIST

## 2018-01-11 PROCEDURE — 70450 CT HEAD/BRAIN W/O DYE: CPT

## 2018-01-11 PROCEDURE — 84295 ASSAY OF SERUM SODIUM: CPT | Performed by: INTERNAL MEDICINE

## 2018-01-11 PROCEDURE — 93010 ELECTROCARDIOGRAM REPORT: CPT | Performed by: INTERNAL MEDICINE

## 2018-01-11 PROCEDURE — 25810000003 SODIUM CHLORIDE 0.9 % WITH KCL 20 MEQ 20-0.9 MEQ/L-% SOLUTION: Performed by: HOSPITALIST

## 2018-01-11 PROCEDURE — 85025 COMPLETE CBC W/AUTO DIFF WBC: CPT | Performed by: HOSPITALIST

## 2018-01-11 PROCEDURE — 80061 LIPID PANEL: CPT | Performed by: HOSPITALIST

## 2018-01-11 PROCEDURE — 99232 SBSQ HOSP IP/OBS MODERATE 35: CPT | Performed by: NURSE PRACTITIONER

## 2018-01-11 PROCEDURE — 97165 OT EVAL LOW COMPLEX 30 MIN: CPT

## 2018-01-11 PROCEDURE — 93005 ELECTROCARDIOGRAM TRACING: CPT | Performed by: NURSE PRACTITIONER

## 2018-01-11 PROCEDURE — 95816 EEG AWAKE AND DROWSY: CPT

## 2018-01-11 PROCEDURE — 94799 UNLISTED PULMONARY SVC/PX: CPT

## 2018-01-11 PROCEDURE — 84484 ASSAY OF TROPONIN QUANT: CPT | Performed by: NURSE PRACTITIONER

## 2018-01-11 PROCEDURE — 97535 SELF CARE MNGMENT TRAINING: CPT

## 2018-01-11 PROCEDURE — 92610 EVALUATE SWALLOWING FUNCTION: CPT | Performed by: SPEECH-LANGUAGE PATHOLOGIST

## 2018-01-11 PROCEDURE — 80053 COMPREHEN METABOLIC PANEL: CPT | Performed by: HOSPITALIST

## 2018-01-11 PROCEDURE — 99232 SBSQ HOSP IP/OBS MODERATE 35: CPT | Performed by: INTERNAL MEDICINE

## 2018-01-11 RX ORDER — ISOSORBIDE MONONITRATE 30 MG/1
30 TABLET, EXTENDED RELEASE ORAL
Status: DISCONTINUED | OUTPATIENT
Start: 2018-01-12 | End: 2018-01-13 | Stop reason: HOSPADM

## 2018-01-11 RX ORDER — POTASSIUM CHLORIDE 7.45 MG/ML
10 INJECTION INTRAVENOUS
Status: DISCONTINUED | OUTPATIENT
Start: 2018-01-11 | End: 2018-01-13

## 2018-01-11 RX ORDER — POTASSIUM CHLORIDE 1.5 G/1.77G
40 POWDER, FOR SOLUTION ORAL AS NEEDED
Status: DISCONTINUED | OUTPATIENT
Start: 2018-01-11 | End: 2018-01-13

## 2018-01-11 RX ORDER — POTASSIUM CHLORIDE 750 MG/1
40 CAPSULE, EXTENDED RELEASE ORAL AS NEEDED
Status: DISCONTINUED | OUTPATIENT
Start: 2018-01-11 | End: 2018-01-13

## 2018-01-11 RX ORDER — IPRATROPIUM BROMIDE AND ALBUTEROL SULFATE 2.5; .5 MG/3ML; MG/3ML
3 SOLUTION RESPIRATORY (INHALATION)
Status: DISCONTINUED | OUTPATIENT
Start: 2018-01-11 | End: 2018-01-13

## 2018-01-11 RX ORDER — OSELTAMIVIR PHOSPHATE 75 MG/1
75 CAPSULE ORAL EVERY 12 HOURS SCHEDULED
Status: DISCONTINUED | OUTPATIENT
Start: 2018-01-11 | End: 2018-01-13 | Stop reason: HOSPADM

## 2018-01-11 RX ORDER — HYDRALAZINE HYDROCHLORIDE 50 MG/1
50 TABLET, FILM COATED ORAL EVERY 8 HOURS SCHEDULED
Status: DISCONTINUED | OUTPATIENT
Start: 2018-01-11 | End: 2018-01-11

## 2018-01-11 RX ORDER — ASPIRIN 325 MG
325 TABLET, DELAYED RELEASE (ENTERIC COATED) ORAL DAILY
Status: DISCONTINUED | OUTPATIENT
Start: 2018-01-11 | End: 2018-01-13 | Stop reason: HOSPADM

## 2018-01-11 RX ORDER — PREDNISONE 20 MG/1
20 TABLET ORAL
Status: DISCONTINUED | OUTPATIENT
Start: 2018-01-11 | End: 2018-01-12

## 2018-01-11 RX ORDER — FUROSEMIDE 20 MG/1
20 TABLET ORAL DAILY
Status: DISCONTINUED | OUTPATIENT
Start: 2018-01-11 | End: 2018-01-13 | Stop reason: HOSPADM

## 2018-01-11 RX ADMIN — METOPROLOL SUCCINATE 50 MG: 50 TABLET, FILM COATED, EXTENDED RELEASE ORAL at 08:14

## 2018-01-11 RX ADMIN — HYDRALAZINE HYDROCHLORIDE 10 MG: 10 TABLET, FILM COATED ORAL at 12:11

## 2018-01-11 RX ADMIN — POTASSIUM CHLORIDE 40 MEQ: 750 CAPSULE, EXTENDED RELEASE ORAL at 17:41

## 2018-01-11 RX ADMIN — POTASSIUM CHLORIDE 40 MEQ: 1.5 POWDER, FOR SOLUTION ORAL at 12:11

## 2018-01-11 RX ADMIN — LACOSAMIDE 150 MG: 100 TABLET, FILM COATED ORAL at 21:01

## 2018-01-11 RX ADMIN — PREDNISONE 20 MG: 20 TABLET ORAL at 17:13

## 2018-01-11 RX ADMIN — POTASSIUM CHLORIDE AND SODIUM CHLORIDE 75 ML/HR: 900; 150 INJECTION, SOLUTION INTRAVENOUS at 03:38

## 2018-01-11 RX ADMIN — IPRATROPIUM BROMIDE AND ALBUTEROL SULFATE 3 ML: .5; 3 SOLUTION RESPIRATORY (INHALATION) at 17:03

## 2018-01-11 RX ADMIN — OSELTAMIVIR PHOSPHATE 75 MG: 75 CAPSULE ORAL at 22:22

## 2018-01-11 RX ADMIN — HYDRALAZINE HYDROCHLORIDE 10 MG: 10 TABLET, FILM COATED ORAL at 00:20

## 2018-01-11 RX ADMIN — PANTOPRAZOLE SODIUM 40 MG: 40 TABLET, DELAYED RELEASE ORAL at 06:33

## 2018-01-11 RX ADMIN — HYDROCHLOROTHIAZIDE 25 MG: 25 TABLET ORAL at 08:14

## 2018-01-11 RX ADMIN — LOSARTAN POTASSIUM 100 MG: 100 TABLET ORAL at 08:12

## 2018-01-11 RX ADMIN — ASPIRIN 325 MG: 325 TABLET, COATED ORAL at 21:01

## 2018-01-11 RX ADMIN — PREDNISONE 20 MG: 20 TABLET ORAL at 21:00

## 2018-01-11 RX ADMIN — HYDRALAZINE HYDROCHLORIDE 75 MG: 50 TABLET, FILM COATED ORAL at 21:02

## 2018-01-11 RX ADMIN — HYDRALAZINE HYDROCHLORIDE 10 MG: 10 TABLET, FILM COATED ORAL at 06:36

## 2018-01-11 RX ADMIN — LACOSAMIDE 150 MG: 100 TABLET, FILM COATED ORAL at 08:12

## 2018-01-11 RX ADMIN — OSELTAMIVIR PHOSPHATE 75 MG: 75 CAPSULE ORAL at 17:14

## 2018-01-11 RX ADMIN — ISOSORBIDE MONONITRATE 60 MG: 60 TABLET, EXTENDED RELEASE ORAL at 08:13

## 2018-01-11 RX ADMIN — ASPIRIN 81 MG: 81 TABLET, CHEWABLE ORAL at 08:11

## 2018-01-11 RX ADMIN — AMLODIPINE BESYLATE 10 MG: 10 TABLET ORAL at 08:14

## 2018-01-11 RX ADMIN — HYDROCODONE BITARTRATE AND HOMATROPINE METHYLBROMIDE 5 ML: 5; 1.5 SOLUTION ORAL at 08:11

## 2018-01-11 RX ADMIN — IPRATROPIUM BROMIDE AND ALBUTEROL SULFATE 3 ML: .5; 3 SOLUTION RESPIRATORY (INHALATION) at 21:12

## 2018-01-11 RX ADMIN — POTASSIUM CHLORIDE 40 MEQ: 1.5 POWDER, FOR SOLUTION ORAL at 07:00

## 2018-01-11 RX ADMIN — MIRTAZAPINE 15 MG: 15 TABLET, FILM COATED ORAL at 22:23

## 2018-01-11 NOTE — PLAN OF CARE
Problem: Patient Care Overview (Adult)  Goal: Plan of Care Review  Outcome: Outcome(s) achieved Date Met: 01/11/18 01/11/18 0505 01/11/18 1254   Coping/Psychosocial Response Interventions   Plan Of Care Reviewed With --  patient   Patient Care Overview   Progress no change --    Outcome Evaluation   Outcome Summary/Follow up Plan --  Pt completed functional transfer from bed to chair with supervision for safety, grooming with modified independence, and simple upper body bathing task with modified independence. Pt does not required skilled OT at this [facility] and will complete OT therapy orders.      Goal: Adult Individualization and Mutuality  Outcome: Outcome(s) achieved Date Met: 01/11/18      Problem: Inpatient Occupational Therapy  Goal: Safety Awareness Goal LTG- OT  Outcome: Outcome(s) achieved Date Met: 01/11/18 01/11/18 1254   Safety Awareness OT LTG   Safety Awareness OT LTG, Date Established 01/11/18   Safety Awareness OT LTG, Time to Achieve 1 day   Safety Awareness OT LTG, Activity Type good safety awareness   Safety Awareness OT LTG, Additional Goal with ADLs and functional transfers. Pt educated on safety upon return home   Safety Awareness OT LTG, Outcome goal met     Goal: Endurance Goal LTG- OT  Outcome: Outcome(s) achieved Date Met: 01/11/18 01/11/18 1254   Endurance OT LTG   Endurance Goal OT LTG, Date Established 01/11/18   Endurance Goal OT LTG, Time to Achieve 1 day   Endurance Goal OT LTG, Activity Level endurance 2 good-   Endurance Goal OT LTG, Additional Goal Pt educated on energy conservation techniques and endurance    Endurance Goal OT LTG, Outcome goal met

## 2018-01-11 NOTE — PLAN OF CARE
Problem: Inpatient SLP  Goal: Dysphagia- Patient will safely consume diet as per recommendation with no signs/symptoms of aspiration  Outcome: Outcome(s) achieved Date Met: 01/11/18 01/11/18 1059   Safely Consume Diet   Safely Consume Diet- SLP, Date Established 01/11/18   Safely Consume Diet- SLP, Time to Achieve by discharge   Safely Consume Diet- SLP, Outcome goal met

## 2018-01-11 NOTE — PROGRESS NOTES
"Daily progress note    Chief complaint  Doing much better  No specific complaints  Denies chest pain shortness of breath palpitation    History of present illness  77-year-old white male with history of seizure disorder for last 6 years on and that other medical problem hypertension hyperlipidemia gastroesophageal reflux disease anxiety disorder brought to the emergency room by wife when she noticed shaking spells with altered mental status.  Patient evaluated in ER to have very high blood pressure and admitted for management.  Patient admitted that he found to be in A. fib with rapid ventricular rate.  Patient denies any chest pain shortness of breath palpitation.  Patient remained fully alert oriented.  Patient feels nauseous but no vomiting.  Patient stated that he did not have any more seizures since she started on Vimpat 6 years ago.     REVIEW OF SYSTEMS  Review of Systems   Constitutional: Negative for chills and fever.   HENT: Negative for congestion and sore throat.    Eyes: Negative.    Respiratory: Negative for cough and shortness of breath.    Cardiovascular: Negative for chest pain and leg swelling.   Gastrointestinal: Negative for abdominal pain, diarrhea and vomiting.   Genitourinary: Negative for difficulty urinating and dysuria.   Musculoskeletal: Negative for back pain and neck pain.   Skin: Negative for rash and wound.   Allergic/Immunologic: Negative.    Neurological: Positive for seizures. Negative for dizziness, weakness, numbness and headaches.   Psychiatric/Behavioral: Positive for confusion.   All other systems reviewed and are negative.     PHYSICAL EXAM  Blood pressure (!) 169/104, pulse 77, temperature 99.6 °F (37.6 °C), temperature source Oral, resp. rate 18, height 177.8 cm (70\"), weight 101 kg (223 lb 5.2 oz), SpO2 94 %.    Constitutional: He is oriented to person, place, and time and well-developed, well-nourished, and in no distress.   Head: Normocephalic and atraumatic.   Eyes: EOM " are normal. Pupils are equal, round, and reactive to light.   Neck: Normal range of motion. Neck supple.   Cardiovascular: Normal rate, regular rhythm and normal heart sounds.    Pulmonary/Chest: Effort normal and breath sounds normal. No respiratory distress.   Abdominal: Soft. There is no tenderness. There is no rebound and no guarding.   Musculoskeletal: Normal range of motion. He exhibits no edema.   Neurological: He is alert and oriented to person, place, and time.   Generally weak, no focal deficits.    Skin: Skin is warm and dry.   Psychiatric: Mood and affect normal.      LAB RESULTS  Lab Results (last 24 hours)     Procedure Component Value Units Date/Time    POC Glucose Once [[id number]]  (Abnormal) Collected:  01/10/18 1732    Specimen:  Blood Updated:  01/10/18 1752     Glucose 143 (H) mg/dL     Narrative:       Meter: [id number] : [id number] Steph Mauricio    POC Glucose Once [[id number]]  (Normal) Collected:  01/11/18 0013    Specimen:  Blood Updated:  01/11/18 0025     Glucose 127 mg/dL     Narrative:       Meter: [id number] : [id number] Katherin Proctor RN    Respiratory Panel, PCR - Swab, Nasopharynx [[id number]]  (Abnormal) Collected:  01/10/18 1955    Specimen:  Swab from Nasopharynx Updated:  01/11/18 0051     ADENOVIRUS, PCR Not Detected     Coronavirus 229E Not Detected     Coronavirus HKU1 Not Detected     Coronavirus NL63 Not Detected     Coronavirus OC43 Not Detected     Human Metapneumovirus Not Detected     Human Rhinovirus/Enterovirus Not Detected     Influenza B PCR Not Detected     Parainfluenza Virus 1 Not Detected     Parainfluenza Virus 2 Not Detected     Parainfluenza Virus 3 Not Detected     Parainfluenza Virus 4 Not Detected     Bordetella pertussis pcr Not Detected     Influenza A H1N1 2009 PCR Not Detected     Chlamydophila pneumoniae PCR Not Detected     Mycoplasma pneumo by PCR Not Detected     Influenza A PCR Not Detected     Influenza A H3 Detected (A)     Influenza A H1  Not Detected     RSV, PCR Not Detected    Troponin [[id number]]  (Normal) Collected:  01/11/18 0013    Specimen:  Blood Updated:  01/11/18 0106     Troponin T 0.013 ng/mL     Narrative:       Troponin T Reference Ranges:  Less than 0.03 ng/mL:    Negative for AMI  0.03 to 0.09 ng/mL:      Indeterminant for AMI  Greater than 0.09 ng/mL: Positive for AMI    Hemoglobin A1c [[id number]]  (Normal) Collected:  01/11/18 0448    Specimen:  Blood Updated:  01/11/18 0527     Hemoglobin A1C 5.30 %     Narrative:       Hemoglobin A1C Ranges:    Increased Risk for Diabetes  5.7% to 6.4%  Diabetes                     >= 6.5%  Diabetic Goal                < 7.0%    CBC & Differential [[id number]] Collected:  01/11/18 0448    Specimen:  Blood Updated:  01/11/18 0543    Narrative:       The following orders were created for panel order CBC & Differential.  Procedure                               Abnormality         Status                     ---------                               -----------         ------                     CBC Auto Differential[[id number]]        Abnormal            Final result                 Please view results for these tests on the individual orders.    CBC Auto Differential [[id number]]  (Abnormal) Collected:  01/11/18 0448    Specimen:  Blood Updated:  01/11/18 0543     WBC 5.79 10*3/mm3      RBC 3.68 (L) 10*6/mm3      Hemoglobin 11.5 (L) g/dL      Hematocrit 33.8 (L) %      MCV 91.8 fL      MCH 31.3 pg      MCHC 34.0 g/dL      RDW 12.7 %      RDW-SD 43.0 fl      MPV 10.5 fL      Platelets 145 10*3/mm3      Neutrophil % 78.8 (H) %      Lymphocyte % 8.3 (L) %      Monocyte % 12.1 (H) %      Eosinophil % 0.2 (L) %      Basophil % 0.3 %      Immature Grans % 0.3 %      Neutrophils, Absolute 4.56 10*3/mm3      Lymphocytes, Absolute 0.48 (L) 10*3/mm3      Monocytes, Absolute 0.70 10*3/mm3      Eosinophils, Absolute 0.01 10*3/mm3      Basophils, Absolute 0.02 10*3/mm3      Immature Grans, Absolute 0.02 10*3/mm3      Comprehensive Metabolic Panel [[id number]]  (Abnormal) Collected:  01/11/18 0448    Specimen:  Blood Updated:  01/11/18 0546     Glucose 113 (H) mg/dL      BUN 16 mg/dL      Creatinine 0.89 mg/dL      Sodium 130 (L) mmol/L      Potassium 3.1 (L) mmol/L      Chloride 92 (L) mmol/L      CO2 30.0 (H) mmol/L      Calcium 7.7 (L) mg/dL      Total Protein 6.1 g/dL      Albumin 3.60 g/dL      ALT (SGPT) 23 U/L      AST (SGOT) 42 (H) U/L      Alkaline Phosphatase 73 U/L      Total Bilirubin 0.6 mg/dL      eGFR Non African Amer 83 mL/min/1.73      Globulin 2.5 gm/dL      A/G Ratio 1.4 g/dL      BUN/Creatinine Ratio 18.0     Anion Gap 8.0 mmol/L     Narrative:       The MDRD GFR formula is only valid for adults with stable renal function between ages 18 and 70.    Lipid Panel [[id number]] Collected:  01/11/18 0448    Specimen:  Blood Updated:  01/11/18 0546     Total Cholesterol 129 mg/dL      Triglycerides 35 mg/dL      HDL Cholesterol 48 mg/dL      LDL Cholesterol  74 mg/dL      VLDL Cholesterol 7 mg/dL      LDL/HDL Ratio 1.54    Narrative:       Cholesterol Reference Ranges  ([place] Department of Health and Human Services ATP III Classifications)    Desirable          <200 mg/dL  Borderline High    200-239 mg/dL  High Risk          >240 mg/dL      Triglyceride Reference Ranges  (U.S. Department of Health and Human Services ATP III Classifications)    Normal           <150 mg/dL  Borderline High  150-199 mg/dL  High             200-499 mg/dL  Very High        >500 mg/dL    HDL Reference Ranges  (U.S. Department of Health and Human Services ATP III Classifcations)    Low     <40 mg/dl (major risk factor for CHD)  High    >60 mg/dl ('negative' risk factor for CHD)        LDL Reference Ranges  (U.S. Department of Health and Human Services ATP III Classifcations)    Optimal          <100 mg/dL  Near Optimal     100-129 mg/dL  Borderline High  130-159 mg/dL  High             160-189 mg/dL  Very High        >189 mg/dL    Troponin  [[id number]]  (Normal) Collected:  01/11/18 0448    Specimen:  Blood Updated:  01/11/18 0553     Troponin T 0.013 ng/mL     Narrative:       Troponin T Reference Ranges:  Less than 0.03 ng/mL:    Negative for AMI  0.03 to 0.09 ng/mL:      Indeterminant for AMI  Greater than 0.09 ng/mL: Positive for AMI    BNP [[id number]]  (Normal) Collected:  01/11/18 0448    Specimen:  Blood Updated:  01/11/18 0553     proBNP 1022.0 pg/mL     Narrative:       Among patients with dyspnea, NT-proBNP is highly sensitive for the detection of acute congestive heart failure. In addition NT-proBNP of <300 pg/ml effectively rules out acute congestive heart failure with 99% negative predictive value.    TSH [[id number]]  (Normal) Collected:  01/11/18 0448    Specimen:  Blood Updated:  01/11/18 0553     TSH 0.580 mIU/mL         Imaging Results (last 24 hours)     Procedure Component Value Units Date/Time    MRI Brain With & Without Contrast [[id number]] Collected:  01/10/18 1608     Updated:  01/10/18 1639    Narrative:       MRI OF THE BRAIN WITH AND WITHOUT CONTRAST      CLINICAL HISTORY: Seizure, off-balance, confusion tonight.      TECHNIQUE: MRI of the brain was obtained with sagittal pre and  postgadolinium T1, axial pre and postgadolinium T1, coronal  postgadolinium T1, axial FLAIR, axial T2, axial diffusion, and axial  gradient echo images.     COMPARISON: Comparison is made to prior CT scan of the head dated  05/30/2016.     FINDINGS:     There are bilateral lateral cerebral hemispheric subdural hygromas. The  collection lateral to the right cerebral hemisphere measures up to 1 cm  in diameter lateral to the right parietal lobe and the collection on the  left measures up to 8 mm in diameter lateral to the left frontal lobe.  Mild mass effect is seen with sulcal effacement. There are no abnormal  areas of restricted diffusion. There are no abnormal areas of  susceptibility artifact. There are moderate changes of chronic small  vessel  ischemic phenomena. 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 The major intracranial flow related  signal voids are unremarkable. No abnormal areas of contrast enhancement  are noted. Given differences in modalities, there is no convincing  interval change when compared to the prior head CT of 05/30/2016.       Impression:          No evidence for acute intracranial pathology.     Given differences in modalities, no convincing interval change is seen  when compared to the prior head CT dated 05/30/2016.     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     Bilateral lateral cerebral hemispheric subdural hygromas resulting in  some mass effect and sulcal effacement.     This report was finalized on 1/10/2018 4:36 PM by Dr. Rocky Pitts MD.       CT Angiogram Neck With & Without Contrast [[id number]] Collected:  01/10/18 1742     Updated:  01/10/18 1759    Narrative:       CT ANGIOGRAM HEAD W CONTRAST-, CT ANGIOGRAM NECK W WO CONTRAST-     CLINICAL HISTORY: Confusion. Possible seizure. Evaluate for CVA.     TECHNIQUE: Transverse 3 mm thick images were acquired from the base of  the skull to the vertex without IV contrast. Subsequently, spiral CT  images were obtained through the neck and head during rapid IV injection  of contrast and were reconstructed in 1 mm thick axial slices. Coronal  and sagittal and 3-D reconstructions were obtained.     Radiation dose reduction techniques were utilized, including automated  exposure control and exposure modulation based on body size.     COMPARISON: MRI of the brain dated 1/10/2018. CT scan of the head dated  1/10/2018.     FINDINGS: The precontrast images demonstrate small bilateral subdural  hygromas or chronic subdural hematomas, larger in size on the right than  the left that are unchanged. Minimal right to left shift of the midline  structures is also unchanged. No acute  infarct or hemorrhage is  identified. There is a tiny chronic lacunar infarct in the left  thalamus. There is mild ill-defined diminished attenuation in the white  matter of both cerebral hemispheres consistent with sequela of mild to  moderate small vessel chronic ischemic change.     There is normal branching of the great vessels from the aortic arch that  all appear widely patent. There are no stenoses in either common carotid  artery. Calcified atherosclerotic plaque at both carotid bifurcations  resulted no NASCET significant stenoses. The vertebral arteries are  codominant and are both widely patent throughout the course in the neck  and head. There are no intracranial occlusions or stenoses. No aneurysms  are identified.     The postcontrast images demonstrate no enhancing lesions within the  brain or brainstem.        Impression:       Small bilateral subdural hygromas with minimal associated  mass effect. Evidence of mild to moderate small vessel chronic ischemic  change as described. Calcified atherosclerotic plaque at both carotid  bifurcations resulting in no significant stenoses. There are no  intracranial occlusions or stenoses.     The patient's nurse was informed that a completed corrected report was  available for review on the electronic medical record system on  1/10/2018 at 5:55 PM.     This report was finalized on 1/10/2018 5:56 PM by Dr. Kwaku Gallegos MD.       CT Angiogram Head With Contrast [[id number]] Collected:  01/10/18 1742     Updated:  01/10/18 1759    Narrative:       CT ANGIOGRAM HEAD W CONTRAST-, CT ANGIOGRAM NECK W WO CONTRAST-     CLINICAL HISTORY: Confusion. Possible seizure. Evaluate for CVA.     TECHNIQUE: Transverse 3 mm thick images were acquired from the base of  the skull to the vertex without IV contrast. Subsequently, spiral CT  images were obtained through the neck and head during rapid IV injection  of contrast and were reconstructed in 1 mm thick axial slices.  Coronal  and sagittal and 3-D reconstructions were obtained.     Radiation dose reduction techniques were utilized, including automated  exposure control and exposure modulation based on body size.     COMPARISON: MRI of the brain dated 1/10/2018. CT scan of the head dated  1/10/2018.     FINDINGS: The precontrast images demonstrate small bilateral subdural  hygromas or chronic subdural hematomas, larger in size on the right than  the left that are unchanged. Minimal right to left shift of the midline  structures is also unchanged. No acute infarct or hemorrhage is  identified. There is a tiny chronic lacunar infarct in the left  thalamus. There is mild ill-defined diminished attenuation in the white  matter of both cerebral hemispheres consistent with sequela of mild to  moderate small vessel chronic ischemic change.     There is normal branching of the great vessels from the aortic arch that  all appear widely patent. There are no stenoses in either common carotid  artery. Calcified atherosclerotic plaque at both carotid bifurcations  resulted no NASCET significant stenoses. The vertebral arteries are  codominant and are both widely patent throughout the course in the neck  and head. There are no intracranial occlusions or stenoses. No aneurysms  are identified.     The postcontrast images demonstrate no enhancing lesions within the  brain or brainstem.        Impression:       Small bilateral subdural hygromas with minimal associated  mass effect. Evidence of mild to moderate small vessel chronic ischemic  change as described. Calcified atherosclerotic plaque at both carotid  bifurcations resulting in no significant stenoses. There are no  intracranial occlusions or stenoses.     The patient's nurse was informed that a completed corrected report was  available for review on the electronic medical record system on  1/10/2018 at 5:55 PM.     This report was finalized on 1/10/2018 5:56 PM by Dr. Kwaku Gallegos MD.            EKG  Atrial fibrillation with rapid ventricle rate  Nonspecific ST-T wave changes  Noted EKG for comparison      Current Facility-Administered Medications:   •  acetaminophen (TYLENOL) tablet 650 mg, 650 mg, Oral, Q4H PRN **OR** acetaminophen (TYLENOL) suppository 650 mg, 650 mg, Rectal, Q4H PRN, INA Herring  •  amLODIPine (NORVASC) tablet 10 mg, 10 mg, Oral, Q24H, James Gong MD, 10 mg at 01/11/18 0814  •  aspirin chewable tablet 81 mg, 81 mg, Oral, Daily, James Gong MD, 81 mg at 01/11/18 0811  •  clonazePAM (KlonoPIN) tablet 0.5 mg, 0.5 mg, Oral, BID PRN, Jaems Gong MD  •  diltiaZEM (CARDIZEM) IVPB 100 mg/100 mL (1 mg/mL) NS (add-vantage), 5-15 mg/hr, Intravenous, Titrated, Colin Escoto MD, Stopped at 01/10/18 1400  •  guaifenesin-dextromethorphan (MUCINEX DM) tablet 1 tablet, 1 tablet, Oral, BID PRN, Ander Sagastume MD  •  hydrALAZINE (APRESOLINE) tablet 10 mg, 10 mg, Oral, Q4H PRN, James Gong MD, 10 mg at 01/11/18 1211  •  hydrochlorothiazide (HYDRODIURIL) tablet 25 mg, 25 mg, Oral, Daily, James Gong MD, 25 mg at 01/11/18 0814  •  HYDROcodone-homatropine (HYCODAN) 5-1.5 MG/5ML syrup 5 mL, 5 mL, Oral, Q4H PRN, Andre Sagastume MD, 5 mL at 01/11/18 0811  •  ipratropium-albuterol (DUO-NEB) nebulizer solution 3 mL, 3 mL, Nebulization, Q4H PRN, Ander Sagastume MD  •  isosorbide mononitrate (IMDUR) 24 hr tablet 60 mg, 60 mg, Oral, Q24H, James Gong MD, 60 mg at 01/11/18 0813  •  lacosamide (VIMPAT) tablet 150 mg, 150 mg, Oral, Q12H, INA Herring, 150 mg at 01/11/18 0812  •  losartan (COZAAR) tablet 100 mg, 100 mg, Oral, Q24H, James Gong MD, 100 mg at 01/11/18 0812  •  metoprolol succinate XL (TOPROL-XL) 24 hr tablet 50 mg, 50 mg, Oral, Q24H, James Gong MD, 50 mg at 01/11/18 0814  •  mirtazapine (REMERON) tablet 15 mg, 15 mg, Oral, Nightly, James Gong MD, 15 mg at 01/10/18 8760  •  ondansetron (ZOFRAN) injection 4 mg, 4 mg, Intravenous, Q6H PRN, NIA Herring  •   pantoprazole (PROTONIX) EC tablet 40 mg, 40 mg, Oral, QAM, Genevieve Gong MD, 40 mg at 01/11/18 0633  •  potassium chloride (MICRO-K) CR capsule 40 mEq, 40 mEq, Oral, PRN **OR** potassium chloride (KLOR-CON) packet 40 mEq, 40 mEq, Oral, PRN, 40 mEq at 01/11/18 1211 **OR** potassium chloride 10 mEq in 100 mL IVPB, 10 mEq, Intravenous, Q1H PRN, Ander Sagastume MD  •  sodium chloride 0.9 % flush 1-10 mL, 1-10 mL, Intravenous, PRN, INA Herring  •  Insert peripheral IV, , , Once **AND** sodium chloride 0.9 % flush 10 mL, 10 mL, Intravenous, PRN, Venkat Ahumada MD  •  zolpidem (AMBIEN) tablet 5 mg, 5 mg, Oral, Nightly PRN, Genevieve Gong MD     ASSESSMENT  Hypertensive urgency  Shaking spell with history of seizure disorder rule out recurrent seizures  New onset A. fib with rapid ventricle rate  Acute influenza A  Gastroesophageal reflux disease  Anxiety disorder  Dehydration    PLAN  CPM  Cardizem drip  Stabilize blood pressure  Supplement oxygen aspirin nitroglycerin and beta blocker  Tamiflu for 5 days  Increased Vimpat  Hold for Lovenox  EEG pending.  Continue home medication  Stress ulcer DVT prophylaxis  Follow closely further recommendation according to hospital course    GENEVIEVE GONG MD

## 2018-01-11 NOTE — THERAPY EVALUATION
Acute Care - Occupational Therapy Initial Evaluation  [facility]     Patient Name: Gabo Boo  : 1940  MRN: [id number]  Today's Date: 2018             Admit Date: 2018       ICD-10-CM ICD-9-CM   1. Seizure R56.9 780.39   2. Essential hypertension I10 401.9   3. Dizzy R42 780.4     Patient Active Problem List   Diagnosis   • Anxiety   • Hyperlipidemia   • Hypertension   • Insomnia   • Prediabetes   • Fatigue   • Focal epilepsy   • Hygroma   • Esophageal spasm   • Seizure     Past Medical History:   Diagnosis Date   • Abnormal barium swallow 2016    HH, reflux   • Anxiety    • GERD (gastroesophageal reflux disease)    • H/O cataract     Dr Heron Hoffman   • Hiatal hernia    • Hyperlipidemia    • Hypertension    • Internal hemorrhoids    • Seizures     2013 was last seizure   • Tubular adenoma of colon      Past Surgical History:   Procedure Laterality Date   • COLONOSCOPY  2013    Dr Galeano/TITO-ena, Ih, tubular adenoma w/low grade dysplasia   • ENDOSCOPY N/A 2016    Procedure: ESOPHAGOGASTRODUODENOSCOPY WITH COLD BIOPSIES AND 54 FR KENNY DILATATION;  Surgeon: Shiraz Galeano MD;  Location: [facility] ENDOSCOPY;  Service:    • EYE SURGERY     • HERNIA REPAIR     • TONSILLECTOMY            OT ASSESSMENT FLOWSHEET (last 72 hours)      OT Evaluation       18 1250 18 1150 18 1056 18 1036 18 1035    Rehab Evaluation    Evaluation Not Performed (P)  other (see comments)  -SV other (see comments)  -SV patient unavailable for evaluation   off floor to EEG, will check back   -EM      General Information    Equipment Currently Used at Home    none  -PT     Living Environment    Lives With    spouse  -PT     Living Arrangements    condominium  -PT     Home Accessibility    no concerns  -PT     Stair Railings at Home    outside, present on right side  -PT     Transportation Available    car;family or friend will provide  -PT     Functional Level Prior     Ambulation     0-->independent  -PT    Transferring     0-->independent  -PT    Toileting     0-->independent  -PT    Bathing     0-->independent  -PT    Dressing     0-->independent  -PT    Eating     0-->independent  -PT    Communication     0-->understands/communicates without difficulty  -PT    Swallowing     0-->swallows foods/liquids without difficulty  -PT      01/11/18 1017 01/11/18 0900 01/10/18 0504          Rehab Evaluation    Document Type (P)  evaluation  -NA evaluation  -SA       Subjective Information (P)  agree to therapy;complains of;fatigue  -NA        Evaluation Not Performed, Comment (P)  pt sitting in bed with bed alarm on and RN present. Pt requesting to get out of bed and brush his teeth  -NA        Patient Effort, Rehab Treatment (P)  excellent  -NA        Symptoms Noted During/After Treatment (P)  none  -NA none  -SA       General Information    Patient Profile Review (P)  yes  -NA        Precautions/Limitations (P)  fall precautions  -NA        Prior Level of Function (P)  independent:;all household mobility;community mobility;ADL's  -NA        Equipment Currently Used at Home (P)  none  -NA  none  -KL      Plans/Goals Discussed With (P)  patient;agreed upon  -NA        Living Environment    Lives With (P)  spouse  -NA  spouse  -KL      Living Arrangements (P)  condominium  -NA  condominium  -KL      Home Accessibility (P)  no concerns  -NA  no concerns  -KL      Stair Railings at Home   inside, present at both sides  -KL      Type of Financial/Environmental Concern (P)  none  -NA  none  -KL      Transportation Available   car  -KL      Functional Level Prior    Ambulation (P)  0-->independent  -NA  0-->independent  -KL      Transferring (P)  0-->independent  -NA  0-->independent  -KL      Toileting (P)  0-->independent  -NA  0-->independent  -KL      Bathing (P)  0-->independent  -NA  0-->independent  -KL      Dressing (P)  0-->independent  -NA  0-->independent  -KL      Eating (P)   0-->independent  -NA  0-->independent  -KL      Communication (P)  0-->understands/communicates without difficulty  -NA  0-->understands/communicates without difficulty  -KL      Swallowing (P)  0-->swallows foods/liquids without difficulty  -NA  0-->swallows foods/liquids without difficulty  -KL      Prior Functional Level Comment   0  -KL      Vital Signs    Intratreatment Heart Rate (beats/min) (P)  75   seated in chair  -NA        Intra SpO2 (%) (P)  93  -NA        O2 Delivery Intra Treatment (P)  room air  -NA        Pain Assessment    Pain Assessment (P)  No/denies pain  -NA No/denies pain  -SA       Vision Assessment/Intervention    Visual Impairment (P)  WNL  -NA        Cognitive Assessment/Intervention    Current Cognitive/Communication Assessment  functional  -SA       Orientation Status (P)  oriented x 4  -NA        Follows Commands/Answers Questions (P)  able to follow multi-step instructions  -NA        Personal Safety Interventions (P)  fall prevention program maintained;gait belt;nonskid shoes/slippers when out of bed  -NA        ROM (Range of Motion)    General ROM (P)  no range of motion deficits identified  -NA        MMT (Manual Muscle Testing)    General MMT Assessment Detail (P)  BUE 4/5  -NA        Bed Mobility, Assessment/Treatment    Bed Mobility, Assistive Device (P)  head of bed elevated  -NA        Bed Mobility, Roll Left, [place] (P)  supervision required  -NA        Bed Mob, Supine to Sit, [place] (P)  supervision required  -NA        Bed Mobility, Comment (P)  supervision secondary to safety  -NA        Transfer Assessment/Treatment    Transfers, Bed-Chair [place] (P)  supervision required  -NA        Transfer, Comment (P)  for safety  -NA        Functional Mobility    Functional Mobility- Ind. Level (P)  supervision required  -NA        Functional Mobility- Comment (P)  taking a few steps to bed side chair  -NA        Upper Body Bathing Assessment/Training    UB Bathing  Assess/Train, Position (P)  sitting  -NA        UB Bathing Assess/Train, [place] Level (P)  conditional independence  -NA        UB Bathing Assess/Train, Comment (P)  washing face  -NA        Lower Body Dressing Assessment/Training    LB Dressing Assess/Train, Clothing Type (P)  doffing:;donning:;slipper socks  -NA        LB Dressing Assess/Train, Position (P)  sitting;edge of bed  -NA        LB Dressing Assess/Train, [place] (P)  minimum assist (75% patient effort)  -NA        LB Dressing Assess/Train, Comment (P)  min assist secondary to tight socks and not having same set up at home.   -NA        Grooming Assessment/Training    Grooming Assess/Train, Position (P)  sitting  -NA        Grooming Assess/Train, Indepen Level (P)  conditional independence  -NA        Grooming Assess/Train, Comment (P)  oral care  -NA        Motor Skills/Interventions    Motor Response Observations (P)  --   slight tremor in BUE, pt stating from being anxious  -NA        Functional Endurance    Detail (Functional Endurance) (P)  --   good-  -NA        Positioning and Restraints    Pre-Treatment Position (P)  in bed  -NA        Post Treatment Position (P)  chair  -NA        In Chair (P)  sitting;call light within reach;notified nsg;encouraged to call for assist  -NA          User Key  (r) = Recorded By, (t) = Taken By, (c) = Cosigned By    Initials Name Effective Dates    SA Saba Gaona, MS CCC-SLP 04/13/15 -     RAH Castanon, PT 12/01/15 -     PT Vijaya Ball, RN 06/16/16 -     PREM Tran, PT 01/17/16 -     LUIS EDUARDO Jamison, OT Student 08/21/17 -     JHONNY Santiago, MIKIE 09/28/17 -            Occupational Therapy Education     Title: PT OT SLP Therapies (Active)     Topic: Occupational Therapy (Active)     Point: ADL training (Done)    Description: Instruct learner(s) on proper safety adaptation and remediation techniques during self care or transfers.   Instruct in proper use of assistive devices.     Learning Progress Summary    Learner Readiness Method Response Comment Documented by Status   Patient Acceptance TB,E,D VU,DU Pt educated on LB dressing techniques to conserve energy, benefits of using grab bars in his shower, and sitting during ADLs if needed. NA 01/11/18 1303 Done               Point: Precautions (Done)    Description: Instruct learner(s) on prescribed precautions during self-care and functional transfers.    Learning Progress Summary    Learner Readiness Method Response Comment Documented by Status   Patient Acceptance E KETTY Pt educated on safety in the home upon return home NA 01/11/18 1303 Done                      User Key     Initials Effective Dates Name Provider Type Discipline     08/21/17 -  Marjorie Jamison OT Student OT Student OT                  OT Recommendation and Plan     Plan of Care Review  Plan Of Care Reviewed With: (P) patient  Outcome Summary/Follow up Plan: (P) Pt completed functional transfer from bed to chair with supervision for safety, grooming with modified independence, and simple upper body bathing task with modified independence. Pt does not required skilled OT at this [facility] and will complete OT therapy orders.            OT Goals       01/11/18 1254          Safety Awareness OT LTG    Safety Awareness OT LTG, Date Established (P)  01/11/18  -NA      Safety Awareness OT LTG, Time to Achieve (P)  1 day  -NA      Safety Awareness OT LTG, Activity Type (P)  good safety awareness  -NA      Safety Awareness OT LTG, Additional Goal (P)  with ADLs and functional transfers. Pt educated on safety upon return home  -NA      Safety Awareness OT LTG, Outcome (P)  goal met  -NA      Endurance OT LTG    Endurance Goal OT LTG, Date Established (P)  01/11/18  -NA      Endurance Goal OT LTG, Time to Achieve (P)  1 day  -NA      Endurance Goal OT LTG, Activity Level (P)  endurance 2 good-  -NA      Endurance Goal OT LTG, Additional Goal (P)  Pt educated on energy conservation  techniques and endurance   -NA      Endurance Goal OT LTG, Outcome (P)  goal met  -NA        User Key  (r) = Recorded By, (t) = Taken By, (c) = Cosigned By    Initials Name Provider Type    NA Marjorie Jamison OT Student OT Student              Time Calculation:   OT Start Time: (P) 0938  OT Stop Time: (P) 1017  OT Time Calculation (min): (P) 39 min    Therapy Charges for Today     Code Description Service Date Service Provider Modifiers Qty    29360604826  OT EVAL LOW COMPLEXITY 2 1/11/2018 Marjorie Jamison OT Student GO 1    [id number]  OT SELF CARE/MGMT/TRAIN EA 15 MIN 1/11/2018 Marjorie Jamison OT Student GO 1               Marjorie Jamison OT Student  1/11/2018

## 2018-01-11 NOTE — THERAPY DISCHARGE NOTE
Acute Care - Speech Language Pathology   Swallow Eval/Discharge [facility]     Patient Name: Gabo Boo  : 1940  MRN: [id number]  Today's Date: 2018               Admit Date: 2018    SPEECH-LANGUAGE PATHOLOGY EVALUATION - SWALLOW  Subjective: The patient was seen on this date for a Clinical Swallow evaluation.  Patient was alert and cooperative.  Significant history: Esophagram  showed silent penetration without aspiration, hiatal hernia, reflux.  Admitted with seizure.  Objective: Textures given included thin liquid, mechanical soft consistency and regular consistency.  Assessment: Difficulties were noted with none of the above consistencies.  SLP Findings:  Patient presents with functional oropharyngeal dysphagia.  Recommendations: Diet Textures: thin liquid, regular consistency food.  Medications should be taken whole with thin liquids.  Recommended Strategies: Upright for PO, small bites and sips and may use straw. Oral care before breakfast, after all meals and PRN.  Dysphagia therapy is not recommended. Rationale: functional swallow.  If change in lung status or clincial s/s noted, please reconsult and can perform instrumental eval.        Visit Dx:    ICD-10-CM ICD-9-CM   1. Seizure R56.9 780.39   2. Essential hypertension I10 401.9   3. Dizzy R42 780.4     Patient Active Problem List   Diagnosis   • Anxiety   • Hyperlipidemia   • Hypertension   • Insomnia   • Prediabetes   • Fatigue   • Focal epilepsy   • Hygroma   • Esophageal spasm   • Seizure     Past Medical History:   Diagnosis Date   • Abnormal barium swallow 2016    HH, reflux   • Anxiety    • GERD (gastroesophageal reflux disease)    • H/O cataract     Dr Heron Hoffman   • Hiatal hernia    • Hyperlipidemia    • Hypertension    • Internal hemorrhoids    • Seizures     2013 was last seizure   • Tubular adenoma of colon      Past Surgical History:   Procedure Laterality Date   • COLONOSCOPY  2013      Froylan/TITO-tics, Ih, tubular adenoma w/low grade dysplasia   • ENDOSCOPY N/A 12/16/2016    Procedure: ESOPHAGOGASTRODUODENOSCOPY WITH COLD BIOPSIES AND 54 FR KENNY DILATATION;  Surgeon: Shiraz Galeano MD;  Location: [facility] ENDOSCOPY;  Service:    • EYE SURGERY     • HERNIA REPAIR     • TONSILLECTOMY            SWALLOW EVALUATION (last 72 hours)      Swallow Evaluation       01/11/18 0900                Rehab Evaluation    Document Type evaluation  -SA        Symptoms Noted During/After Treatment none  -SA        General Information    Patient Profile Review yes  -SA        Pertinent History Of Current Problem --   seizure; esophagram 2016 w/penetration,hiatal hernia,reflux  -SA        Current Diet Limitations thin liquids;regular solid  -SA        Prior Level of Function- Swallowing no diet consistency restrictions  -SA        Plans/Goals Discussed With patient  -SA        Barriers to Rehab none identified  -SA        Clinical Impression    Patient's Goals For Discharge return home  -SA        SLP Swallowing Diagnosis other (see comments)   functional oropharyngeal dysphagia  -SA        Rehab Potential/Prognosis, Swallowing good, to achieve stated therapy goals  -SA        Criteria for Skilled Therapeutic Interventions Met no problems identified which require skilled intervention  -SA        Therapy Frequency PRN  -SA        Predicted Duration Therapy Interv (days) until discharge  -SA        SLP Diet Recommendation regular textures;thin liquids  -SA        Recommended Feeding/Eating Techniques maintain upright posture during/after eating for 30 mins;small sips/bites  -SA        SLP Rec. for Method of Medication Administration meds whole with thin liquid  -SA        Monitor For Signs Of Aspiration pneumonia;right lower lobe infiltrates  -SA        Anticipated Discharge Disposition home  -SA        Pain Assessment    Pain Assessment No/denies pain  -SA        Cognitive Assessment/Intervention    Current  Cognitive/Communication Assessment functional  -SA        Oral Motor Structure and Function    Oral Motor Anatomy and Physiology patient demonstrates anatomy and physiology that is WNL  -          User Key  (r) = Recorded By, (t) = Taken By, (c) = Cosigned By    Initials Name Effective Dates    SA Walter Glenn MS CCC-SLP 04/13/15 -         EDUCATION  The patient has been educated in the following areas:   Dysphagia (Swallowing Impairment).    SLP Recommendation and Plan  SLP Swallowing Diagnosis: other (see comments) (functional oropharyngeal dysphagia)  SLP Diet Recommendation: regular textures, thin liquids  Recommended Feeding/Eating Techniques: maintain upright posture during/after eating for 30 mins, small sips/bites  SLP Rec. for Method of Medication Administration: meds whole with thin liquid  Monitor For Signs Of Aspiration: pneumonia, right lower lobe infiltrates     Criteria for Skilled Therapeutic Interventions Met: no problems identified which require skilled intervention  Anticipated Discharge Disposition: home  Rehab Potential/Prognosis, Swallowing: good, to achieve stated therapy goals  Therapy Frequency: PRN                        IP SLP Goals       01/11/18 1059          Safely Consume Diet    Safely Consume Diet- SLP, Date Established 01/11/18  -      Safely Consume Diet- SLP, Time to Achieve by discharge  -      Safely Consume Diet- SLP, Outcome goal met  -        User Key  (r) = Recorded By, (t) = Taken By, (c) = Cosigned By    Initials Name Provider Type    SA Saba Gaona MS CCC-SLP Speech and Language Pathologist             SLP Outcome Measures (last 72 hours)      SLP Outcome Measures       01/11/18 0900          SLP Outcome Measures    Outcome Measure Used? Adult NOMS  -      FCM Scores    Swallowing FCM Score 7  -        User Key  (r) = Recorded By, (t) = Taken By, (c) = Cosigned By    Initials Name Effective Dates     Saba Candelaria MS KIKO-SLP 04/13/15 -             Time Calculation:         Time Calculation- SLP       01/11/18 1100          Time Calculation- SLP    SLP Start Time 0900  -      SLP Stop Time 0945  -      SLP Time Calculation (min) 45 min  -      SLP Received On 01/11/18  -        User Key  (r) = Recorded By, (t) = Taken By, (c) = Cosigned By    Initials Name Provider Type     Saba Gaona MS CCC-SLP Speech and Language Pathologist          Therapy Charges for Today     Code Description Service Date Service Provider Modifiers Qty    60135679591 HC ST EVAL ORAL PHARYNG SWALLOW 3 1/11/2018 Saba Gaona MS CCC-SLP GN 1               SLP Discharge Summary  Anticipated Discharge Disposition: home    MS ELKE MaharajSLP  1/11/2018

## 2018-01-11 NOTE — PROGRESS NOTES
"[facility]  Cardiology Progress Note    Patient Identification:  Name: Gabo Boo  Age: 77 y.o.  Sex: male  :  1940  MRN: [id number]                 Follow Up / Chief Complaint: :afib rvr - ?duration,  HTN,        Interval History:  77yr old male with no documented h/o CAD, arrhythmia. (However, home meds include Isosorbide)  Known  HTN, seizure disorder, esophageal strictures requiring dilatation and GERD who presents to ER with spouse reporting onset of  \"shaking spells\", stumbling and altered mental status. BP on arrival to ED  220/120 was treated with Labetalol. Admission EKG showed atrial fib with rvr.Denied chest pain and SOA     Subjective:    No cp, tightness, sob      Objective:  CE (-)  Converted to SR HR 70'80's  -150's / 90's - 110    Past Medical History:  Past Medical History:   Diagnosis Date   • Abnormal barium swallow 2016    HH, reflux   • Anxiety    • GERD (gastroesophageal reflux disease)    • H/O cataract     Dr Heron Hoffman   • Hiatal hernia    • Hyperlipidemia    • Hypertension    • Internal hemorrhoids    • Seizures     2013 was last seizure   • Tubular adenoma of colon      Past Surgical History:  Past Surgical History:   Procedure Laterality Date   • COLONOSCOPY  2013    Dr Galeano/Jacque, , tubular adenoma w/low grade dysplasia   • ENDOSCOPY N/A 2016    Procedure: ESOPHAGOGASTRODUODENOSCOPY WITH COLD BIOPSIES AND 54 FR KENNY DILATATION;  Surgeon: Shiraz Galeano MD;  Location: [facility] ENDOSCOPY;  Service:    • EYE SURGERY     • HERNIA REPAIR     • TONSILLECTOMY          Social History:   Social History   Substance Use Topics   • Smoking status: Never Smoker   • Smokeless tobacco: Not on file   • Alcohol use 1.2 oz/week     1 Cans of beer, 1 Shots of liquor per week      Comment: daily      Family History:  Family History   Problem Relation Age of Onset   • Stroke Father           Allergies:  No Known Allergies  Scheduled " "Meds:    amLODIPine 10 mg Q24H   aspirin 81 mg Daily   hydrALAZINE 50 mg Q8H   ipratropium-albuterol 3 mL TID - RT   [START ON 2018] isosorbide mononitrate 30 mg Q24H   lacosamide 150 mg Q12H   losartan 100 mg Q24H   metoprolol succinate XL 50 mg Q24H   mirtazapine 15 mg Nightly   oseltamivir 75 mg Q12H   pantoprazole 40 mg QAM   predniSONE 20 mg TID With Meals           INTAKE AND OUTPUT:    Intake/Output Summary (Last 24 hours) at 18 1804  Last data filed at 18 0722   Gross per 24 hour   Intake                0 ml   Output             1615 ml   Net            -1615 ml       Review of Systems:   GI:  No nausea, vomitting  Cardiac:  No cp, tightness  Pulmonary:no sob    Constitutional:  Temp:  [97.9 °F (36.6 °C)-99.6 °F (37.6 °C)] 99.6 °F (37.6 °C)  Heart Rate:  [57-86] 70  Resp:  [18-20] 18  BP: (102-185)/() 157/93    Physical Exam:             Physical Exam  /93 (BP Location: Right arm, Patient Position: Lying)  Pulse 70  Temp 99.6 °F (37.6 °C) (Oral)   Resp 18  Ht 177.8 cm (70\")  Wt 101 kg (223 lb 5.2 oz)  SpO2 93%  BMI 32.04 kg/m2    General appearance: NAD, conversant   Eyes: anicteric sclerae, moist conjunctivae; no lid-lag; PERRLA   HENT: Atraumatic; oropharynx clear with moist mucous membranes and no mucosal ulcerations;  normal hard and soft palate   Neck: Trachea midline; FROM, supple, no thyromegaly or lymphadenopathy   Lungs: CTA, with normal respiratory effort and no intercostal retractions   CV: S1-S2 regular, no murmurs, no rub, no gallop   Abdomen: Soft, non-tender; no masses or HSM   Extremities: No peripheral edema or extremity lymphadenopathy  Skin: Normal temperature, turgor and texture; no rash, ulcers or subcutaneous nodules   Psych: Appropriate affect, alert and oriented to person, place and time         Cardiographics    EC18: sinus rythym     Echocardiogram:     · There is calcification of the aortic valve.  · Mild aortic valve regurgitation is " "present.  · Left atrial cavity size is mildly dilated.  · Left Ventricle: Calculated EF = 69.9%.  · Trace-to-mild mitral valve regurgitation  · There is no evidence of pericardial effusion.  Lab Review     Results from last 7 days  Lab Units 01/11/18  0448 01/11/18  0013 01/10/18  1036   TROPONIN T ng/mL 0.013 0.013 0.010           Results from last 7 days  Lab Units 01/11/18  0448   SODIUM mmol/L 130*   POTASSIUM mmol/L 3.1*   BUN mg/dL 16   CREATININE mg/dL 0.89   CALCIUM mg/dL 7.7*       Results from last 7 days  Lab Units 01/11/18  0448 01/09/18  2349   WBC 10*3/mm3 5.79 9.83   HEMOGLOBIN g/dL 11.5* 14.0   HEMATOCRIT % 33.8* 41.2   PLATELETS 10*3/mm3 145 192       Results from last 7 days  Lab Units 01/09/18  2349   INR  1.00   APTT seconds 27.5         Assessment:    - atrial fib rvr - ?duration  - accelerated HTN  - s/p muscle and mental status changes  - h/o seizures  - acute bronchitis    Plan:  - atrial fib rvr - ?duration- > SR  MI has been ruled out. [id number] of >/=2.  On full dose aspirin    - accelerated HTN - -150's / 90's - 110 on Norvasc 10, Toprol 50, Hydralazine 50 and Cozaar 100.     - EF 65-70% - mild AR, trace-mild MR      Add low dose Lasix. Monitor BMP and maintain K 3.5-4.5 range. Increase aspirin to 325mg    Labs/tests ordered for am: BMP    For stress test am    I reviewed the patient's new clinical results and treatment plan   I personally viewed and interpreted the patient's EKG/Telemetry data    )1/11/2018  Colin Escoto MD      EMR Dragon/Transcription:   \"Dictated utilizing Dragon dictation\".     "

## 2018-01-11 NOTE — PLAN OF CARE
Problem: Fall Risk (Adult)  Goal: Identify Related Risk Factors and Signs and Symptoms  Outcome: Ongoing (interventions implemented as appropriate)    Goal: Absence of Falls  Outcome: Ongoing (interventions implemented as appropriate)      Problem: Patient Care Overview (Adult)  Goal: Plan of Care Review  Outcome: Ongoing (interventions implemented as appropriate)   01/11/18 0505   Coping/Psychosocial Response Interventions   Plan Of Care Reviewed With patient   Patient Care Overview   Progress no change   Outcome Evaluation   Outcome Summary/Follow up Plan pt resting tonight. At times, he wanted to get out of bed without assistance- remains at high risk for falls. BP elevated (-150's- sometimes 160's) and required administration of PRN Hydralazine. Pt is overflow; waiting for available bed.     Goal: Adult Individualization and Mutuality  Outcome: Ongoing (interventions implemented as appropriate)    Goal: Discharge Needs Assessment  Outcome: Ongoing (interventions implemented as appropriate)      Problem: Seizure Disorder/Epilepsy (Adult)  Goal: Signs and Symptoms of Listed Potential Problems Will be Absent or Manageable (Seizure Disorder/Epilepsy)  Outcome: Ongoing (interventions implemented as appropriate)

## 2018-01-11 NOTE — SIGNIFICANT NOTE
01/11/18 1056   Rehab Treatment   Discipline physical therapist   Rehab Evaluation   Evaluation Not Performed patient unavailable for evaluation  (off floor to EEG, will check back )   Recommendation   PT - Next Appointment 01/11/18

## 2018-01-11 NOTE — CONSULTS
[facility]    Reason for Consult: cough, critical care management    HPI:  Mr. Boo is a 78yo WM with  A history of seizure d/o but has not had a seizure in sometime. He has been feeling un well for several day with a cough productive of plegm and not sleeping well due to this. He has tried cough suppressants and hycodan cough syrup as an outpatient and got a zpak as well.  He began acting strangely at home so he was brought to the Emergency room and was found to be very hypertensive and to have afib with RVR.  He was subsequently admitted to ICU.  His bp and HR are now much better.  He still complains of productive cough. Which he states is severe.    Past Medical History:   Diagnosis Date   • Abnormal barium swallow 07/08/2016    HH, reflux   • Anxiety    • GERD (gastroesophageal reflux disease)    • H/O cataract     Dr Heron Hoffman   • Hiatal hernia    • Hyperlipidemia    • Hypertension    • Internal hemorrhoids    • Seizures     July 2013 was last seizure   • Tubular adenoma of colon      Social History     Social History   • Marital status:      Spouse name: N/A   • Number of children: N/A   • Years of education: N/A     Social History Main Topics   • Smoking status: Never Smoker   • Smokeless tobacco: None   • Alcohol use 1.2 oz/week     1 Cans of beer, 1 Shots of liquor per week      Comment: daily   • Drug use: No   • Sexual activity: Not Asked     Other Topics Concern   • None     Social History Narrative     Family History   Problem Relation Age of Onset   • Stroke Father      MEDS: reviewed  ALL: NKDA  ROS:  General constitutional symptoms:  Appropriate for stated age. Denies any fever, chills, weakness, sweating or heat/cold tolerance. No recent weigh gain or weight loss.   Skin, Hair, and Nails.  Patient denies any wounds, rash, jaundice, or purititis. Well groomed.   Head and Neck. Patient with no complaints of headache. Patient denies any head or neck injury   Eyes. No  visual changes, eye pain, swelling, discharge, tearing, or complaints. Last eye exam:   Nose and Sinuses. (+) URI symptoms;lZpak initiated PTA. Patient denies change in sense of smell, epistaxis, dry nose. Patient denies sinus tenderness.   Cardiovascular. Patient denies and CPor edema. Able to perform ADL’s w/o getting SOA or CP.   Chest and Lungs. Denies any SOA or pain with inspiration, cough, wheezing or hemoptysis.   Endocrine. Patient has no history of and denies any changes in thyroid, skin, hair, or temperature preference. Pt. denies polydipsia, polyphagia, or polyuria. No unexplained weight gains or loss.   Hematologic. Patient denies bleeding, excess bruising, anemia, or excessive fatigue.   Lymphatic. Denies any lymphadenopathy.   Musculoskeletal.  Patient denies any muscle or joint pain or muscle weakness. Performs ADLs w/o limitations.   Neurological:  Denies any fainting, blackouts,paralysis, numbness or loss of sensation, or tremors.     Vital Sign Min/Max for last 24 hours  Temp  Min: 98 °F (36.7 °C)  Max: 100.9 °F (38.3 °C)   BP  Min: 102/80  Max: 220/120   Pulse  Min: 56  Max: 149   Resp  Min: 14  Max: 18   SpO2  Min: 89 %  Max: 97 %   Flow (L/min)  Min: 2  Max: 2   Weight  Min: 96.6 kg (213 lb)  Max: 101 kg (223 lb 5.2 oz)     GEN:   appears ill,  AxOx3  HEENT: PERRL, EOMI, no icterus, mmm, no jvd, trachea midline, neck supple  CHEST: a little coarse bilat, + fanit wheezes, no crackles, no use of accessory muscles  CV: irrg, no m/g/r  ABD: soft, nt, nd +bs, no hepatosplenomegaly  EXT: no c/c/ trace edema  SKIN: no rashes, no xanthomas, nl turgor  LYMPH: no palpable cervical or supraclavicular lymphadenopathy  NEURO: CN 2-12 intact and symmetric bilaterally  PSYCH: nl affect, nl orientation, nl judgement, nl mood  MSK: no kyphoscolisosis, 5/5 strength ue and le bilaterally    Labs:  Trop 0.01  Cr 1.15  Bicarb 27  Wbc 9.8 (86%)  hgb 14  plts 192  CXR: no acute disease    A/P:  1. Acute bronchitis --  check viral respiratory panel; care is supportive;  I suspect he got ill took OTC meds for his cold in addition to narcotic cough syrup and zithromax -- the combined polypharmacy likely made him confused and the zithromax can prolong q-t contributing to afib combined with some respiratory distress.  I don't know if he took sudafed or something of that nature that might have contributed to HTN.  At any rate; will try and treat symptomatically.    2. Bronchospasm -- will cautiously use some duonebs in light of his tachycardia  3. Afib with rvr-- rate controlled, cardiology following  4. Hypertensive urgency -- resolved  5. Mental status changes -- neurology following    Can move out of ICU

## 2018-01-11 NOTE — PLAN OF CARE
Problem: Fall Risk (Adult)  Goal: Absence of Falls  Outcome: Ongoing (interventions implemented as appropriate)  From ICU today. No seizures today. Positive for flu-droplet isolation. Alert and oriented. C/O some chest pain from coughing. Productive cough-green tinged.. Potassium replacement today. New level due at 21:45.

## 2018-01-11 NOTE — PROGRESS NOTES
Discharge Planning Assessment  [facility]     Patient Name: Gabo Boo  MRN: [id number]  Today's Date: 1/11/2018    Admit Date: 1/9/2018          Discharge Needs Assessment       01/11/18 1036    Living Environment    Lives With spouse    Living Arrangements condominium    Home Accessibility no concerns    Stair Railings at Home outside, present on right side    Transportation Available car;family or friend will provide    Living Environment    Quality Of Family Relationships supportive    Able to Return to Prior Living Arrangements yes    Discharge Needs Assessment    Concerns To Be Addressed no discharge needs identified    Equipment Currently Used at Home none            Discharge Plan       01/11/18 1036    Case Management/Social Work Plan    Plan Home with spouse     Additional Comments Spoke with spouse Travis Boo [phone] patient is independent with ADL's. Patient lives with his wife in a condo and plans to return home denies any discharge needs. Vijaya Ball RN        Discharge Placement     No information found                Demographic Summary       01/11/18 1033    Referral Information    Admission Type inpatient    Arrived From home or self-care    Reason For Consult discharge planning    Primary Care Physician Information    Name DR Adilene NEGRON    Phone [phone]            Functional Status       01/11/18 1035    Functional Status Current    Ambulation 2-->assistive person    Transferring 2-->assistive person    Toileting 2-->assistive person    Bathing 2-->assistive person    Dressing 2-->assistive person    Eating 2-->assistive person    Functional Status Prior    Ambulation 0-->independent    Transferring 0-->independent    Toileting 0-->independent    Bathing 0-->independent    Dressing 0-->independent    Eating 0-->independent    Communication 0-->understands/communicates without difficulty    Swallowing 0-->swallows foods/liquids without difficulty    IADL     Medications independent    Meal Preparation independent    Housekeeping independent    Laundry independent    Shopping independent    Oral Care independent            Psychosocial     None            Abuse/Neglect     None            Legal     None            Substance Abuse     None            Patient Forms     None          Vijaya Ball RN

## 2018-01-11 NOTE — PROGRESS NOTES
"NEUROLOGY Progress Note         PATIENT Gabo Boo    1940   MRN [id number]   ADMIT DATE 2018   LENGTH OF STAY 1 days   ATTENDING James Gong MD   Historian Patient and Wife @ bedside     HISTORY OF PRESENT ILLNESS:  Overnight nurse reports an episode of bilateral hand tremors\" that lasted <1min.   Patient denies HA, double/blurred vision, dizziness, numbness or loss of sensation or any other new neurological complaints at this time.       CHIEF COMPLAINT: Hypertension; Fatigue; and Seizures      DIAGNOSIS: Seizure [R56.9]  Essential hypertension [I10]  Dizzy [R42]        PHYSICAL EXAM:  GENERAL: Reveals a man who appears his stated age, in no acute distress. Ill appearing. (+) Influenza   VITAL SIGNS: /95  Pulse 77  Temp 99.6 °F (37.6 °C) (Oral)   Resp 18  Ht 177.8 cm (70\")  Wt 101 kg (223 lb 5.2 oz)  SpO2 91%  BMI 32.04 kg/m2  NECK: Carotids are equal without bruits.   HEART: Regular rate and rhythm with no significant murmur or gallop. Afib(130s) has converted to NSR (60s)   EXTREMITIES: Nonedematous. Pulses are intact. There is no rash.  No respiratory distress. There are no signs of cutaneous embolization.  NEUROLOGIC: Awake, alert and oriented x3. There is no aphasia or dysarthria.  Patient can do simple calculations and remembers two out of three objects in five minutes. Visual fields are full.Cranial nerves II through XII are intact. Power is normal in all 4 extremities. Tone is normal. Toes are downgoing. Non-focal exam     NIHSS Remains unchanged     0-->Alert: keenly responsive  0-->Answers both questions correctly  0-->Performs both tasks correctly  0=normal  0=Partial hemianopia  0=Normal symmetric movement  0-->No drift: limb holds 90 (or 45) degrees for full 10 secs  0-->No drift: limb holds 90 (or 45) degrees for full 10 secs  0-->No drift: limb holds 90 (or 45) degrees for full 10 secs  0-->No drift: limb holds 90 (or 45) degrees for full 10 secs  0=Absent  0=Normal; " no sensory loss  0=No aphasia, normal  0=Normal  0=No abnormality    Total score: 0    DIAGNOSTIC DATA:     Lab Results   Component Value Date    WBC 5.79 01/11/2018    HGB 11.5 (L) 01/11/2018    HCT 33.8 (L) 01/11/2018    MCV 91.8 01/11/2018     01/11/2018     Lab Results   Component Value Date    GLUCOSE 113 (H) 01/11/2018    BUN 16 01/11/2018    CREATININE 0.89 01/11/2018    EGFRIFNONA 83 01/11/2018    EGFRIFAFRI 83 06/20/2016    BCR 18.0 01/11/2018    K 3.1 (L) 01/11/2018    CO2 30.0 (H) 01/11/2018    CALCIUM 7.7 (L) 01/11/2018    PROTENTOTREF 6.3 06/20/2016    ALBUMIN 3.60 01/11/2018    LABIL2 1.4 01/11/2018    AST 42 (H) 01/11/2018    ALT 23 01/11/2018     Lab Results   Component Value Date    TSH 0.580 01/11/2018      Lab Results   Component Value Date    CKTOTAL 66 12/02/2015    TROPONINT 0.013 01/11/2018     .  .  Results for orders placed or performed during the hospital encounter of 01/09/18   CT Head Without Contrast    Narrative    CT SCAN OF THE BRAIN WITHOUT CONTRAST.     TECHNIQUE: Radiation dose reduction techniques were utilized, including  automated exposure control and exposure modulation based on body size.  Multiple axial images of the brain were obtained from the vertex to the  base of the brain.     HISTORY: Altered mental status. Headache.     COMPARISON: 05/30/2016.     FINDINGS:   Midline structures are within normal limits, there is no hydrocephalus.  Gray-white matter differentiation is maintained. Findings of small  vessel ischemic disease, a few old lacunar infarcts and cortical  atrophy. 1.2 cm hygroma layers the right cerebral hemisphere, no  significant change.     Orbits are within normal limits. No significant mucosal disease of the  para-nasal sinuses. Mastoid air cells are well aerated.              Impression    No definite acute intracranial pathology. Overall no significant change.              MRI reviewed personally by Dr. Garcia; Negative for acute pathology. No  pituitarytumor noted. Agree with radiology impressions. We belive that this event is a seizure      TTE   LA Volume Index 30        CTA Head/ Neck   IMPRESSION:  Small bilateral subdural hygromas with minimal associated  mass effect. Evidence of mild to moderate small vessel chronic ischemicchange as described. Calcified atherosclerotic plaque at both carotid bifurcations resulting in no significant stenoses. There are no  intracranial occlusions or stenoses.      IMPRESSION: This is a 76 yo gentleman with HTN,hyperlipidemia, prediabetes and seizure d/o who presented to the ED 01/09/2018 with complaint of stumbled gait, confusion and repetitive movements of hands consistent with automatisms that occurred with previous Sz. Overnight patient had one event of bilateral hand shaking which lasted less than 1 min. TTE revealed an EF 69%; unremarkable. no PE/PFO. CTA head/neck revealed calcified atherosclerotic plaques in bilateral carotids; no occlusion/stenosis (as above). EEG pending.       Plan:  EEG pending           Thank you for allowing me to see this patient.    Camila Lacy, INA  1/11/2018  11:01 AM

## 2018-01-11 NOTE — PROGRESS NOTES
"  PROGRESS NOTE   LOS: 1 day   Patient Care Team:  Lew Head MD as PCP - General (Internal Medicine)  Aly Rutherford MD as PCP - Claims Attributed    Chief Complaint: Possible seizure and active influenza infection with bronchospasm    Interval History: Patient was transferred to the ICU for management of a possible seizure and the respiratory symptoms.  He is doing much better and he is on room air, he had an EEG would be managed by neurology.  Still coughing and still having some wheezes and chest tightness but his shortness of breath is definitely better.  Patient is cleared for transfer out of the ICU from the medical and the neurological standpoint.    REVIEW OF SYSTEMS:   CARDIOVASCULAR: No chest pain, chest pressure or chest discomfort. No palpitations or edema.   RESPIRATORY: Improving shortness of breath was positive cough and positive wheezing still.   GASTROINTESTINAL: No anorexia, nausea, vomiting or diarrhea. No abdominal pain or blood.   HEMATOLOGIC: No bleeding or bruising.     Ventilator/Non-Invasive Ventilation Settings     None            Vital Signs  Temp:  [97.9 °F (36.6 °C)-99.6 °F (37.6 °C)] 99.6 °F (37.6 °C)  Heart Rate:  [57-86] 77  Resp:  [16-18] 18  BP: (102-185)/() 169/104  SpO2:  [86 %-96 %] 94 %  on  Flow (L/min):  [2] 2 O2 Device: room air    Intake/Output Summary (Last 24 hours) at 01/11/18 1317  Last data filed at 01/11/18 0722   Gross per 24 hour   Intake                0 ml   Output             1615 ml   Net            -1615 ml     Flowsheet Rows         First Filed Value    Admission Height  177.8 cm (70\") Documented at 01/09/2018 2340    Admission Weight  96.6 kg (213 lb) Documented at 01/09/2018 2340        Body mass index is 32.04 kg/(m^2).  Last 3 weights    01/09/18  2340 01/10/18  1733   Weight: 96.6 kg (213 lb) 101 kg (223 lb 5.2 oz)       Physical Exam:  GEN:  No acute distress, alert, cooperative, well developed   EYES:   Sclera clear. No icterus. PERRL. " Normal EOM  ENT:   External ears/nose normal, no oral lesions, no thrush, mucous membranes moist  NECK:  Supple, midline trachea, no JVD  LUNGS: Normal chest on inspection, Positive expiratory wheezes was minor progression of the expiratory phase, no crackles, positive rhonchi.   CV:  Regular rhythm and rate. Normal S1/S2. No murmurs, gallops, or rubs noted.  ABD:  Soft, non-tender and non-distended. Normal bowel sounds. No guarding  EXT:  Moves all extremities well. No cyanosis. No redness. No edema.   Skin: dry, intact, no bleeding    Results Review:        Results from last 7 days  Lab Units 01/11/18  0448 01/09/18  2349   SODIUM mmol/L 130* 136   POTASSIUM mmol/L 3.1* 3.5   CHLORIDE mmol/L 92* 94*   CO2 mmol/L 30.0* 27.0   BUN mg/dL 16 18   CREATININE mg/dL 0.89 1.15   CALCIUM mg/dL 7.7* 9.3   AST (SGOT) U/L 42* 34   ALT (SGPT) U/L 23 26   ANION GAP mmol/L 8.0 15.0   ALBUMIN g/dL 3.60 4.40       Results from last 7 days  Lab Units 01/11/18  0448 01/11/18  0013 01/10/18  1036   TROPONIN T ng/mL 0.013 0.013 0.010       Results from last 7 days  Lab Units 01/11/18  0448   TSH mIU/mL 0.580       Results from last 7 days  Lab Units 01/11/18  0448   PROBNP pg/mL 1022.0       Results from last 7 days  Lab Units 01/11/18  0448 01/09/18  2349   WBC 10*3/mm3 5.79 9.83   HEMOGLOBIN g/dL 11.5* 14.0   HEMATOCRIT % 33.8* 41.2   PLATELETS 10*3/mm3 145 192   MCV fL 91.8 93.4   NEUTROPHIL % % 78.8* 86.2*   LYMPHOCYTE % % 8.3* 4.1*   MONOCYTES % % 12.1* 7.6   EOSINOPHIL % % 0.2* 1.4   BASOPHIL % % 0.3 0.4   IMM GRAN % % 0.3 0.3       Results from last 7 days  Lab Units 01/09/18  2349   INR  1.00   APTT seconds 27.5           Results from last 7 days  Lab Units 01/11/18  0448   CHOLESTEROL mg/dL 129   TRIGLYCERIDES mg/dL 35   HDL CHOL mg/dL 48   LDL CHOL mg/dL 74           Results from last 7 days  Lab Units 01/11/18  0448   HEMOGLOBIN A1C % 5.30     Glucose   Date/Time Value Ref Range Status   01/11/2018 0013 127 70 - 130 mg/dL  Final   01/10/2018 1732 143 (H) 70 - 130 mg/dL Final   01/10/2018 1201 123 70 - 130 mg/dL Final               Results from last 7 days  Lab Units 01/10/18  0026   NITRITE UA  Negative   WBC UA /HPF 0-2   BACTERIA UA /HPF None Seen   SQUAM EPITHEL UA /HPF 0-2       Results from last 7 days  Lab Units 01/10/18  1955   ADENOVIRUS DETECTION BY PCR  Not Detected   CORONAVIRUS 229E  Not Detected   CORONAVIRUS HKU1  Not Detected   CORONAVIRUS NL63  Not Detected   CORONAVIRUS OC43  Not Detected   HUMAN METAPNEUMOVIRUS  Not Detected   HUMAN RHINOVIRUS/ENTEROVIRUS  Not Detected   INFLUENZA B PCR  Not Detected   PARAINFLUENZA 1  Not Detected   PARAINFLUENZA VIRUS 2  Not Detected   PARAINFLUENZA VIRUS 3  Not Detected   PARAINFLUENZA VIRUS 4  Not Detected   BORDETELLA PERTUSSIS PCR  Not Detected   CHLAMYDOPHILA PNEUMONIAE PCR  Not Detected   MYCOPLAMA PNEUMO PCR  Not Detected   INFLUENZA A PCR  Not Detected   INFLUENZA A H3  Detected*   INFLUENZA A H1  Not Detected   RSV, PCR  Not Detected           Imaging:   Imaging Results (all)     Procedure Component Value Units Date/Time    XR Chest 2 View [[id number]] Collected:  01/10/18 0024     Updated:  01/10/18 0024    Narrative:       CHEST PA AND LATERAL.     HISTORY: Cough.     COMPARISON: 12/20/2011.     FINDINGS:  Cardiomediastinal silhouette is within normal limits.         There is no consolidation or effusion. Lung volumes are low.          Impression:       No acute findings.           CT Head Without Contrast [[id number]] Collected:  01/10/18 0145     Updated:  01/10/18 0146    Narrative:       CT SCAN OF THE BRAIN WITHOUT CONTRAST.     TECHNIQUE: Radiation dose reduction techniques were utilized, including  automated exposure control and exposure modulation based on body size.  Multiple axial images of the brain were obtained from the vertex to the  base of the brain.     HISTORY: Altered mental status. Headache.     COMPARISON: 05/30/2016.     FINDINGS:   Midline structures  are within normal limits, there is no hydrocephalus.  Gray-white matter differentiation is maintained. Findings of small  vessel ischemic disease, a few old lacunar infarcts and cortical  atrophy. 1.2 cm hygroma layers the right cerebral hemisphere, no  significant change.     Orbits are within normal limits. No significant mucosal disease of the  para-nasal sinuses. Mastoid air cells are well aerated.              Impression:       No definite acute intracranial pathology. Overall no significant change.             MRI Brain With & Without Contrast [[id number]] Collected:  01/10/18 1608     Updated:  01/10/18 1639    Narrative:       MRI OF THE BRAIN WITH AND WITHOUT CONTRAST      CLINICAL HISTORY: Seizure, off-balance, confusion tonight.      TECHNIQUE: MRI of the brain was obtained with sagittal pre and  postgadolinium T1, axial pre and postgadolinium T1, coronal  postgadolinium T1, axial FLAIR, axial T2, axial diffusion, and axial  gradient echo images.     COMPARISON: Comparison is made to prior CT scan of the head dated  05/30/2016.     FINDINGS:     There are bilateral lateral cerebral hemispheric subdural hygromas. The  collection lateral to the right cerebral hemisphere measures up to 1 cm  in diameter lateral to the right parietal lobe and the collection on the  left measures up to 8 mm in diameter lateral to the left frontal lobe.  Mild mass effect is seen with sulcal effacement. There are no abnormal  areas of restricted diffusion. There are no abnormal areas of  susceptibility artifact. There are moderate changes of chronic small  vessel ischemic phenomena. 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 The major intracranial flow related  signal voids are unremarkable. No abnormal areas of contrast enhancement  are noted. Given differences in modalities, there is no convincing  interval change when compared to the prior head CT of 05/30/2016.       Impression:          No  evidence for acute intracranial pathology.     Given differences in modalities, no convincing interval change is seen  when compared to the prior head CT dated 05/30/2016.     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     Bilateral lateral cerebral hemispheric subdural hygromas resulting in  some mass effect and sulcal effacement.     This report was finalized on 1/10/2018 4:36 PM by Dr. Rocky Pitts MD.       CT Angiogram Neck With & Without Contrast [[id number]] Collected:  01/10/18 1742     Updated:  01/10/18 1759    Narrative:       CT ANGIOGRAM HEAD W CONTRAST-, CT ANGIOGRAM NECK W WO CONTRAST-     CLINICAL HISTORY: Confusion. Possible seizure. Evaluate for CVA.     TECHNIQUE: Transverse 3 mm thick images were acquired from the base of  the skull to the vertex without IV contrast. Subsequently, spiral CT  images were obtained through the neck and head during rapid IV injection  of contrast and were reconstructed in 1 mm thick axial slices. Coronal  and sagittal and 3-D reconstructions were obtained.     Radiation dose reduction techniques were utilized, including automated  exposure control and exposure modulation based on body size.     COMPARISON: MRI of the brain dated 1/10/2018. CT scan of the head dated  1/10/2018.     FINDINGS: The precontrast images demonstrate small bilateral subdural  hygromas or chronic subdural hematomas, larger in size on the right than  the left that are unchanged. Minimal right to left shift of the midline  structures is also unchanged. No acute infarct or hemorrhage is  identified. There is a tiny chronic lacunar infarct in the left  thalamus. There is mild ill-defined diminished attenuation in the white  matter of both cerebral hemispheres consistent with sequela of mild to  moderate small vessel chronic ischemic change.     There is normal branching of the great vessels from the aortic arch that  all appear  widely patent. There are no stenoses in either common carotid  artery. Calcified atherosclerotic plaque at both carotid bifurcations  resulted no NASCET significant stenoses. The vertebral arteries are  codominant and are both widely patent throughout the course in the neck  and head. There are no intracranial occlusions or stenoses. No aneurysms  are identified.     The postcontrast images demonstrate no enhancing lesions within the  brain or brainstem.        Impression:       Small bilateral subdural hygromas with minimal associated  mass effect. Evidence of mild to moderate small vessel chronic ischemic  change as described. Calcified atherosclerotic plaque at both carotid  bifurcations resulting in no significant stenoses. There are no  intracranial occlusions or stenoses.     The patient's nurse was informed that a completed corrected report was  available for review on the electronic medical record system on  1/10/2018 at 5:55 PM.     This report was finalized on 1/10/2018 5:56 PM by Dr. Kwaku Gallegos MD.       CT Angiogram Head With Contrast [[id number]] Collected:  01/10/18 1742     Updated:  01/10/18 1759    Narrative:       CT ANGIOGRAM HEAD W CONTRAST-, CT ANGIOGRAM NECK W WO CONTRAST-     CLINICAL HISTORY: Confusion. Possible seizure. Evaluate for CVA.     TECHNIQUE: Transverse 3 mm thick images were acquired from the base of  the skull to the vertex without IV contrast. Subsequently, spiral CT  images were obtained through the neck and head during rapid IV injection  of contrast and were reconstructed in 1 mm thick axial slices. Coronal  and sagittal and 3-D reconstructions were obtained.     Radiation dose reduction techniques were utilized, including automated  exposure control and exposure modulation based on body size.     COMPARISON: MRI of the brain dated 1/10/2018. CT scan of the head dated  1/10/2018.     FINDINGS: The precontrast images demonstrate small bilateral subdural  hygromas or chronic  subdural hematomas, larger in size on the right than  the left that are unchanged. Minimal right to left shift of the midline  structures is also unchanged. No acute infarct or hemorrhage is  identified. There is a tiny chronic lacunar infarct in the left  thalamus. There is mild ill-defined diminished attenuation in the white  matter of both cerebral hemispheres consistent with sequela of mild to  moderate small vessel chronic ischemic change.     There is normal branching of the great vessels from the aortic arch that  all appear widely patent. There are no stenoses in either common carotid  artery. Calcified atherosclerotic plaque at both carotid bifurcations  resulted no NASCET significant stenoses. The vertebral arteries are  codominant and are both widely patent throughout the course in the neck  and head. There are no intracranial occlusions or stenoses. No aneurysms  are identified.     The postcontrast images demonstrate no enhancing lesions within the  brain or brainstem.        Impression:       Small bilateral subdural hygromas with minimal associated  mass effect. Evidence of mild to moderate small vessel chronic ischemic  change as described. Calcified atherosclerotic plaque at both carotid  bifurcations resulting in no significant stenoses. There are no  intracranial occlusions or stenoses.     The patient's nurse was informed that a completed corrected report was  available for review on the electronic medical record system on  1/10/2018 at 5:55 PM.     This report was finalized on 1/10/2018 5:56 PM by Dr. Kwaku Gallegos MD.             I reviewed the patient's new clinical results.  I personally viewed and interpreted the patient's imaging results:        Medication Review:     amLODIPine 10 mg Oral Q24H   aspirin 81 mg Oral Daily   hydrALAZINE 50 mg Oral Q8H   [START ON 1/12/2018] isosorbide mononitrate 30 mg Oral Q24H   lacosamide 150 mg Oral Q12H   losartan 100 mg Oral Q24H   metoprolol succinate  XL 50 mg Oral Q24H   mirtazapine 15 mg Oral Nightly   oseltamivir 75 mg Oral Q12H   pantoprazole 40 mg Oral QAM         diltiaZEM 5-15 mg/hr Last Rate: Stopped (01/10/18 1400)       ASSESSMENT:   1. Acute bronchitis with influenza H3 infection  2. Bronchospasm, doing better on bronchodilator  3. Atrial fibrillation with rapid ventricular response currently rate controlled  4. Hypertensive urgency that has resolved  5. Mental status changes with seizure being evaluated by neurology    PLAN:  Case was discussed in details with the patient and with the spouse the patient is cleared to transfer out of the ICU.  This didn't need to be on the bronchodilator and we will add steroids.  No need for any antibiotic from my standpoint but will reassess and follow.  Patient is tolerating by mouth, he is to have a further follow-up on his chemistry to correct the electrolyte, his renal function is definitely better.  We'll discontinue the IV fluid at this point.  Tamiflu is not indicated since his symptoms have been on for more than 48 hours actually has been more than 4-5 days  We will defer further seizure and neurology workup to the neurology consultant team   Discussed with the spouse the need to have a post exposure prophylaxis with Tamiflu and she is to address with her primary care provider  Given the improvement in the heart rate we will change the DuoNeb to scheduled 3 times a day     Discussed with Patient and spouse    Disposition: Depending on hospital course    Ishan Aparicio MD  01/11/18  1:17 PM        EMR Dragon/Transcription:   Much of this encounter note is an electronic transcription/translation of spoken language to printed text. The electronic translation of spoken language may permit erroneous, or at times, nonsensical words or phrases to be inadvertently transcribed; Although I have reviewed the note for such errors, some may still exist.

## 2018-01-12 ENCOUNTER — APPOINTMENT (OUTPATIENT)
Dept: NUCLEAR MEDICINE | Facility: HOSPITAL | Age: 78
End: 2018-01-12

## 2018-01-12 LAB
ANION GAP SERPL CALCULATED.3IONS-SCNC: 11.7 MMOL/L
BASOPHILS # BLD AUTO: 0.01 10*3/MM3 (ref 0–0.2)
BASOPHILS NFR BLD AUTO: 0.2 % (ref 0–1.5)
BH CV STRESS COMMENTS STAGE 1: NORMAL
BH CV STRESS DOSE REGADENOSON STAGE 1: 0.4
BH CV STRESS DURATION MIN STAGE 1: 0
BH CV STRESS DURATION SEC STAGE 1: 15
BH CV STRESS PROTOCOL 1: NORMAL
BH CV STRESS RECOVERY BP: NORMAL MMHG
BH CV STRESS RECOVERY HR: 86 BPM
BH CV STRESS STAGE 1: 1
BUN BLD-MCNC: 14 MG/DL (ref 8–23)
BUN/CREAT SERPL: 15.9 (ref 7–25)
CALCIUM SPEC-SCNC: 8 MG/DL (ref 8.6–10.5)
CHLORIDE SERPL-SCNC: 97 MMOL/L (ref 98–107)
CO2 SERPL-SCNC: 24.3 MMOL/L (ref 22–29)
CREAT BLD-MCNC: 0.88 MG/DL (ref 0.76–1.27)
DEPRECATED RDW RBC AUTO: 42.6 FL (ref 37–54)
EOSINOPHIL # BLD AUTO: 0 10*3/MM3 (ref 0–0.7)
EOSINOPHIL NFR BLD AUTO: 0 % (ref 0.3–6.2)
ERYTHROCYTE [DISTWIDTH] IN BLOOD BY AUTOMATED COUNT: 12.6 % (ref 11.5–14.5)
GFR SERPL CREATININE-BSD FRML MDRD: 84 ML/MIN/1.73
GLUCOSE BLD-MCNC: 150 MG/DL (ref 65–99)
HCT VFR BLD AUTO: 37 % (ref 40.4–52.2)
HGB BLD-MCNC: 12.5 G/DL (ref 13.7–17.6)
IMM GRANULOCYTES # BLD: 0.02 10*3/MM3 (ref 0–0.03)
IMM GRANULOCYTES NFR BLD: 0.4 % (ref 0–0.5)
LV EF NUC BP: 67 %
LYMPHOCYTES # BLD AUTO: 0.41 10*3/MM3 (ref 0.9–4.8)
LYMPHOCYTES NFR BLD AUTO: 9.2 % (ref 19.6–45.3)
MAXIMAL PREDICTED HEART RATE: 145 BPM
MCH RBC QN AUTO: 31 PG (ref 27–32.7)
MCHC RBC AUTO-ENTMCNC: 33.8 G/DL (ref 32.6–36.4)
MCV RBC AUTO: 91.8 FL (ref 79.8–96.2)
MONOCYTES # BLD AUTO: 0.35 10*3/MM3 (ref 0.2–1.2)
MONOCYTES NFR BLD AUTO: 7.8 % (ref 5–12)
NEUTROPHILS # BLD AUTO: 3.67 10*3/MM3 (ref 1.9–8.1)
NEUTROPHILS NFR BLD AUTO: 82.4 % (ref 42.7–76)
PERCENT MAX PREDICTED HR: 60 %
PLATELET # BLD AUTO: 172 10*3/MM3 (ref 140–500)
PMV BLD AUTO: 10.1 FL (ref 6–12)
POTASSIUM BLD-SCNC: 3.9 MMOL/L (ref 3.5–5.2)
RBC # BLD AUTO: 4.03 10*6/MM3 (ref 4.6–6)
SODIUM BLD-SCNC: 133 MMOL/L (ref 136–145)
STRESS BASELINE BP: NORMAL MMHG
STRESS BASELINE HR: 80 BPM
STRESS PERCENT HR: 71 %
STRESS POST PEAK BP: NORMAL MMHG
STRESS POST PEAK HR: 87 BPM
STRESS TARGET HR: 123 BPM
WBC NRBC COR # BLD: 4.46 10*3/MM3 (ref 4.5–10.7)

## 2018-01-12 PROCEDURE — 25010000002 REGADENOSON 0.4 MG/5ML SOLUTION: Performed by: HOSPITALIST

## 2018-01-12 PROCEDURE — 93017 CV STRESS TEST TRACING ONLY: CPT

## 2018-01-12 PROCEDURE — 94799 UNLISTED PULMONARY SVC/PX: CPT

## 2018-01-12 PROCEDURE — 99232 SBSQ HOSP IP/OBS MODERATE 35: CPT | Performed by: INTERNAL MEDICINE

## 2018-01-12 PROCEDURE — 93018 CV STRESS TEST I&R ONLY: CPT | Performed by: INTERNAL MEDICINE

## 2018-01-12 PROCEDURE — 0 TECHNETIUM SESTAMIBI: Performed by: INTERNAL MEDICINE

## 2018-01-12 PROCEDURE — 97110 THERAPEUTIC EXERCISES: CPT | Performed by: PHYSICAL THERAPIST

## 2018-01-12 PROCEDURE — A9500 TC99M SESTAMIBI: HCPCS | Performed by: INTERNAL MEDICINE

## 2018-01-12 PROCEDURE — 63710000001 PREDNISONE PER 1 MG: Performed by: INTERNAL MEDICINE

## 2018-01-12 PROCEDURE — 25010000002 ONDANSETRON PER 1 MG: Performed by: NURSE PRACTITIONER

## 2018-01-12 PROCEDURE — 0 TECHNETIUM SESTAMIBI: Performed by: HOSPITALIST

## 2018-01-12 PROCEDURE — 78452 HT MUSCLE IMAGE SPECT MULT: CPT

## 2018-01-12 PROCEDURE — 80048 BASIC METABOLIC PNL TOTAL CA: CPT | Performed by: HOSPITALIST

## 2018-01-12 PROCEDURE — 97161 PT EVAL LOW COMPLEX 20 MIN: CPT | Performed by: PHYSICAL THERAPIST

## 2018-01-12 PROCEDURE — 78452 HT MUSCLE IMAGE SPECT MULT: CPT | Performed by: INTERNAL MEDICINE

## 2018-01-12 PROCEDURE — A9500 TC99M SESTAMIBI: HCPCS | Performed by: HOSPITALIST

## 2018-01-12 PROCEDURE — 85025 COMPLETE CBC W/AUTO DIFF WBC: CPT | Performed by: HOSPITALIST

## 2018-01-12 RX ORDER — PREDNISONE 20 MG/1
20 TABLET ORAL 2 TIMES DAILY WITH MEALS
Status: DISCONTINUED | OUTPATIENT
Start: 2018-01-12 | End: 2018-01-13 | Stop reason: HOSPADM

## 2018-01-12 RX ADMIN — TECHNETIUM TC 99M SESTAMIBI 1 DOSE: 1 INJECTION INTRAVENOUS at 08:15

## 2018-01-12 RX ADMIN — PREDNISONE 20 MG: 20 TABLET ORAL at 11:37

## 2018-01-12 RX ADMIN — REGADENOSON 0.4 MG: 0.08 INJECTION, SOLUTION INTRAVENOUS at 08:15

## 2018-01-12 RX ADMIN — LOSARTAN POTASSIUM 100 MG: 100 TABLET ORAL at 11:38

## 2018-01-12 RX ADMIN — HYDRALAZINE HYDROCHLORIDE 75 MG: 50 TABLET, FILM COATED ORAL at 22:01

## 2018-01-12 RX ADMIN — PREDNISONE 20 MG: 20 TABLET ORAL at 19:19

## 2018-01-12 RX ADMIN — IPRATROPIUM BROMIDE AND ALBUTEROL SULFATE 3 ML: .5; 3 SOLUTION RESPIRATORY (INHALATION) at 10:57

## 2018-01-12 RX ADMIN — TECHNETIUM TC 99M SESTAMIBI 1 DOSE: 1 INJECTION INTRAVENOUS at 07:30

## 2018-01-12 RX ADMIN — OSELTAMIVIR PHOSPHATE 75 MG: 75 CAPSULE ORAL at 22:00

## 2018-01-12 RX ADMIN — ISOSORBIDE MONONITRATE 30 MG: 30 TABLET ORAL at 11:37

## 2018-01-12 RX ADMIN — LACOSAMIDE 150 MG: 100 TABLET, FILM COATED ORAL at 11:43

## 2018-01-12 RX ADMIN — LACOSAMIDE 150 MG: 100 TABLET, FILM COATED ORAL at 22:04

## 2018-01-12 RX ADMIN — CLONAZEPAM 0.5 MG: 0.5 TABLET ORAL at 14:26

## 2018-01-12 RX ADMIN — FUROSEMIDE 20 MG: 20 TABLET ORAL at 11:36

## 2018-01-12 RX ADMIN — AMLODIPINE BESYLATE 10 MG: 10 TABLET ORAL at 11:37

## 2018-01-12 RX ADMIN — MIRTAZAPINE 15 MG: 15 TABLET, FILM COATED ORAL at 22:00

## 2018-01-12 RX ADMIN — ONDANSETRON 4 MG: 2 INJECTION INTRAMUSCULAR; INTRAVENOUS at 00:39

## 2018-01-12 RX ADMIN — PANTOPRAZOLE SODIUM 40 MG: 40 TABLET, DELAYED RELEASE ORAL at 06:37

## 2018-01-12 RX ADMIN — OSELTAMIVIR PHOSPHATE 75 MG: 75 CAPSULE ORAL at 11:38

## 2018-01-12 RX ADMIN — IPRATROPIUM BROMIDE AND ALBUTEROL SULFATE 3 ML: .5; 3 SOLUTION RESPIRATORY (INHALATION) at 16:12

## 2018-01-12 RX ADMIN — IPRATROPIUM BROMIDE AND ALBUTEROL SULFATE 3 ML: .5; 3 SOLUTION RESPIRATORY (INHALATION) at 20:50

## 2018-01-12 RX ADMIN — METOPROLOL SUCCINATE 50 MG: 50 TABLET, FILM COATED, EXTENDED RELEASE ORAL at 11:37

## 2018-01-12 RX ADMIN — CLONAZEPAM 0.5 MG: 0.5 TABLET ORAL at 23:27

## 2018-01-12 RX ADMIN — ASPIRIN 325 MG: 325 TABLET, COATED ORAL at 11:38

## 2018-01-12 RX ADMIN — HYDRALAZINE HYDROCHLORIDE 75 MG: 50 TABLET, FILM COATED ORAL at 06:37

## 2018-01-12 NOTE — PROGRESS NOTES
"[facility]  Cardiology Progress Note    Patient Identification:  Name: Gabo Boo  Age: 77 y.o.  Sex: male  :  1940  MRN: [id number]                 Follow Up / Chief Complaint: :afib rvr - ?duration,  HTN,        Interval History:  77yr old male with no documented h/o CAD, arrhythmia. (However, home meds include Isosorbide)  Known  HTN, seizure disorder, esophageal strictures requiring dilatation and GERD who presents to ER with spouse reporting onset of  \"shaking spells\", stumbling and altered mental status. BP on arrival to ED  220/120 was treated with Labetalol. Admission EKG showed atrial fib with rvr.Denied chest pain and SOA     Subjective:**    No cp, tightness, sob     In the setting of atrial fibrillation, I have had extensive discussions with the patient and spouse regarding the risks and benefits of anticoagulation therapy( which include increased risk of bleeding and decreased risk of systemic embolization such as stroke) All questions were answered and they both voiced understanding. It has been agreed that we will recommend aspirin only at this time due to the increased risks including seizure disorder       Objective:  Stress test was  (-)    's / 70's    Past Medical History:  Past Medical History:   Diagnosis Date   • Abnormal barium swallow 2016    HH, reflux   • Anxiety    • GERD (gastroesophageal reflux disease)    • H/O cataract     Dr Heron Hoffman   • Hiatal hernia    • Hyperlipidemia    • Hypertension    • Internal hemorrhoids    • Seizures     2013 was last seizure   • Tubular adenoma of colon      Past Surgical History:  Past Surgical History:   Procedure Laterality Date   • COLONOSCOPY  2013    Dr Galeano/[place], , tubular adenoma w/low grade dysplasia   • ENDOSCOPY N/A 2016    Procedure: ESOPHAGOGASTRODUODENOSCOPY WITH COLD BIOPSIES AND 54 FR KENNY DILATATION;  Surgeon: Shiraz Galeano MD;  Location: [facility] ENDOSCOPY;  " Service:    • EYE SURGERY     • HERNIA REPAIR     • TONSILLECTOMY          Social History:   Social History   Substance Use Topics   • Smoking status: Never Smoker   • Smokeless tobacco: Not on file   • Alcohol use 1.2 oz/week     1 Cans of beer, 1 Shots of liquor per week      Comment: daily      Family History:  Family History   Problem Relation Age of Onset   • Stroke Father           Allergies:  No Known Allergies  Scheduled Meds:    amLODIPine 10 mg Q24H   aspirin 325 mg Daily   furosemide 20 mg Daily   hydrALAZINE 75 mg Q8H   ipratropium-albuterol 3 mL TID - RT   isosorbide mononitrate 30 mg Q24H   lacosamide 150 mg Q12H   losartan 100 mg Q24H   metoprolol succinate XL 50 mg Q24H   mirtazapine 15 mg Nightly   oseltamivir 75 mg Q12H   pantoprazole 40 mg QAM   predniSONE 20 mg TID With Meals           INTAKE AND OUTPUT:  No intake or output data in the 24 hours ending 01/12/18 1347    Review of Systems:   GI:  No nausea, vomitting  Cardiac:  No cp, tightness  Pulmonary:no sob    Constitutional:  Temp:  [98 °F (36.7 °C)-99.4 °F (37.4 °C)] 98 °F (36.7 °C)  Heart Rate:  [70-87] 87  Resp:  [16-20] 18  BP: (122-165)/(68-99) 122/70    Physical Exam:**  General appearance: NAD, conversant   Eyes: anicteric sclerae, moist conjunctivae; no lid-lag; PERRLA   HENT: Atraumatic; oropharynx clear with moist mucous membranes and no mucosal ulcerations;  normal hard and soft palate   Neck: Trachea midline; FROM, supple, no thyromegaly or lymphadenopathy   Lungs: CTA, with normal respiratory effort and no intercostal retractions   CV: S1-S2 regular, no murmurs, no rub, no gallop   Abdomen: Soft, non-tender; no masses or HSM   Extremities: No peripheral edema or extremity lymphadenopathy  Skin: Normal temperature, turgor and texture; no rash, ulcers or subcutaneous nodules   Psych: Appropriate affect, alert and oriented to person, place and time         Cardiographics    Stress test:  (-)for ischemia    Echocardiogram:      · There is calcification of the aortic valve.  · Mild aortic valve regurgitation is present.  · Left atrial cavity size is mildly dilated.  · Left Ventricle: Calculated EF = 69.9%.  · Trace-to-mild mitral valve regurgitation  · There is no evidence of pericardial effusion.  Lab Review     Results from last 7 days  Lab Units 01/11/18  0448 01/11/18  0013 01/10/18  1036   TROPONIN T ng/mL 0.013 0.013 0.010           Results from last 7 days  Lab Units 01/12/18  0520   SODIUM mmol/L 133*   POTASSIUM mmol/L 3.9   BUN mg/dL 14   CREATININE mg/dL 0.88   CALCIUM mg/dL 8.0*       Results from last 7 days  Lab Units 01/12/18  0520 01/11/18  0448 01/09/18  2349   WBC 10*3/mm3 4.46* 5.79 9.83   HEMOGLOBIN g/dL 12.5* 11.5* 14.0   HEMATOCRIT % 37.0* 33.8* 41.2   PLATELETS 10*3/mm3 172 145 192       Results from last 7 days  Lab Units 01/09/18  2349   INR  1.00   APTT seconds 27.5         Assessment:    - atrial fib rvr - ?duration  - accelerated HTN  - s/p muscle and mental status changes  - h/o seizures  - acute bronchitis    Plan:  - atrial fib rvr - ?duration- > SR  MI has been ruled out. [id number] of >/=2.  In the setting of atrial fibrillation, I have had extensive discussions with the patient and spouse regarding the risks and benefits of anticoagulation therapy( which include increased risk of bleeding and decreased risk of systemic embolization such as stroke) All questions were answered and they both voiced understanding. It has been agreed that we will recommend aspirin only at this time due to the increased risks including seizure disorder     - accelerated HTN -  BP controlled on current medical therapy.  120's / 70's  on Norvasc 10, Toprol 50, Lasix, Hydralazine 75 and Cozaar 100.   MI has been ruled out. Stress test was (-)for ischemia     - EF 65-70% - mild AR, trace-mild MR     MI has been ruled out. Stress test was (- ) for ischemia.. BP controlled and within recommended parameters as per Neurology.   No  "further ischemic work up planned at this time    In the setting of atrial fibrillation, I have had extensive discussions with the patient and spouse regarding the risks and benefits of anticoagulation therapy( which include increased risk of bleeding and decreased risk of systemic embolization such as stroke) All questions were answered and they both voiced understanding. It has been agreed that we will recommend aspirin only at this time due to the increased risks including seizure disorder     Outpatient follow up recommended in 2=3 weeks. Pt needs to return to ER for recurrent symptoms including, but not limited to: chest pain/pressure/tightness, palpitations, dizziness, weakness, near syncope or syncope at which time cardiac cath would be recommended      I reviewed the patient's new clinical results and treatment plan   I personally viewed and interpreted the patient's EKG/Telemetry data    )1/12/2018  Colin Escoto MD      EMR Dragon/Transcription:   \"Dictated utilizing Dragon dictation\".     "

## 2018-01-12 NOTE — THERAPY DISCHARGE NOTE
Acute Care - Physical Therapy Initial Eval/Discharge  [facility]     Patient Name: Gabo Boo  : 1940  MRN: [id number]  Today's Date: 2018                Admit Date: 2018    Visit Dx:    ICD-10-CM ICD-9-CM   1. Seizure R56.9 780.39   2. Essential hypertension I10 401.9   3. Dizzy R42 780.4     Patient Active Problem List   Diagnosis   • Anxiety   • Hyperlipidemia   • Hypertension   • Insomnia   • Prediabetes   • Fatigue   • Focal epilepsy   • Hygroma   • Esophageal spasm   • Seizure     Past Medical History:   Diagnosis Date   • Abnormal barium swallow 2016    HH, reflux   • Anxiety    • GERD (gastroesophageal reflux disease)    • H/O cataract     Dr Heron Hoffman   • Hiatal hernia    • Hyperlipidemia    • Hypertension    • Internal hemorrhoids    • Seizures     2013 was last seizure   • Tubular adenoma of colon      Past Surgical History:   Procedure Laterality Date   • COLONOSCOPY  2013    Dr Galeano/Maddison , tubular adenoma w/low grade dysplasia   • ENDOSCOPY N/A 2016    Procedure: ESOPHAGOGASTRODUODENOSCOPY WITH COLD BIOPSIES AND 54 FR KENNY DILATATION;  Surgeon: Shiraz Galeano MD;  Location: [facility] ENDOSCOPY;  Service:    • EYE SURGERY     • HERNIA REPAIR     • TONSILLECTOMY            PT ASSESSMENT (last 72 hours)      PT Evaluation       18 1046 18 1250    Rehab Evaluation    Document Type evaluation;discharge summary  -     Subjective Information agree to therapy;no complaints  -     Evaluation Not Performed  (P)  other (see comments)  -SV    Patient Effort, Rehab Treatment excellent  -KH     Symptoms Noted During/After Treatment none  -KH     General Information    Precautions/Limitations fall precautions  -KH     Prior Level of Function independent:  -KH     Equipment Currently Used at Home none  -KH     Living Environment    Lives With spouse  -KH     Living Arrangements condominium  -     Home Accessibility stairs to enter home  -KH      Number of Stairs to Enter Home 2  -KH     Clinical Impression    Patient/Family Goals Statement return home  -     Criteria for Skilled Therapeutic Interventions Met no problems identified which require skilled intervention  -     Impairments Found (describe specific impairments) gait, locomotion, and balance  -     Pain Assessment    Pain Assessment No/denies pain  -     Cognitive Assessment/Intervention    Current Cognitive/Communication Assessment functional  -     Orientation Status oriented x 4  -KH     Follows Commands/Answers Questions 100% of the time  -     Personal Safety Interventions fall prevention program maintained  -     ROM (Range of Motion)    General ROM Detail WFL  -     MMT (Manual Muscle Testing)    General MMT Assessment Detail WFL  -     Bed Mobility, Assessment/Treatment    Bed Mob, Supine to Sit, [place] independent  -     Bed Mob, Sit to Supine, [place] not tested  -     Transfer Assessment/Treatment    Transfers, Sit-Stand [place] stand by assist  -     Transfers, Stand-Sit [place] stand by assist  -     Gait Assessment/Treatment    Gait, [place] Level contact guard assist;stand by assist  -     Gait, Distance (Feet) 250  -     Gait, Gait Deviations brenna decreased  -     Stairs Assessment/Treatment    Number of Stairs 5  -KH     Stairs, Handrail Location left side (ascending)  -     Stairs, [place] Level contact guard assist  -     Positioning and Restraints    Pre-Treatment Position in bed   no alarm  -     Post Treatment Position chair  -     In Chair reclined;call light within reach;encouraged to call for assist;notified nsg  -       01/11/18 1150 01/11/18 1056    Rehab Evaluation    Evaluation Not Performed other (see comments)  -SV patient unavailable for evaluation   off floor to EEG, will check back   -EM      01/11/18 1036 01/11/18 1017    Rehab Evaluation    Document Type  evaluation  -VS (r) NA (t) VS  (c)    Subjective Information  agree to therapy;complains of;fatigue  -VS (r) NA (t) VS (c)    Evaluation Not Performed, Comment  pt sitting in bed with bed alarm on and RN present. Pt requesting to get out of bed and brush his teeth  -VS (r) NA (t) VS (c)    Patient Effort, Rehab Treatment  excellent  -VS (r) NA (t) VS (c)    Symptoms Noted During/After Treatment  none  -VS (r) NA (t) VS (c)    General Information    Patient Profile Review  yes  -VS (r) NA (t) VS (c)    Precautions/Limitations  fall precautions  -VS (r) NA (t) VS (c)    Prior Level of Function  independent:;all household mobility;community mobility;ADL's  -VS (r) NA (t) VS (c)    Equipment Currently Used at Home none  -PT none  -VS (r) NA (t) VS (c)    Plans/Goals Discussed With  patient;agreed upon  -VS (r) NA (t) VS (c)    Living Environment    Lives With spouse  -PT spouse  -VS (r) NA (t) VS (c)    Living Arrangements condominium  -PT condominium  -VS (r) NA (t) VS (c)    Home Accessibility no concerns  -PT no concerns  -VS (r) NA (t) VS (c)    Stair Railings at Home outside, present on right side  -PT     Type of Financial/Environmental Concern  none  -VS (r) NA (t) VS (c)    Transportation Available car;family or friend will provide  -PT     Vital Signs    Intratreatment Heart Rate (beats/min)  75   seated in chair  -VS (r) NA (t) VS (c)    Intra SpO2 (%)  93  -VS (r) NA (t) VS (c)    O2 Delivery Intra Treatment  room air  -VS (r) NA (t) VS (c)    Pain Assessment    Pain Assessment  No/denies pain  -VS (r) NA (t) VS (c)    Vision Assessment/Intervention    Visual Impairment  WNL  -VS (r) NA (t) VS (c)    Cognitive Assessment/Intervention    Orientation Status  oriented x 4  -VS (r) NA (t) VS (c)    Follows Commands/Answers Questions  able to follow multi-step instructions  -VS (r) NA (t) VS (c)    Personal Safety Interventions  fall prevention program maintained;gait belt;nonskid shoes/slippers when out of bed  -VS (r) NA (t) VS (c)    ROM  (Range of Motion)    General ROM  no range of motion deficits identified  -VS (r) NA (t) VS (c)    MMT (Manual Muscle Testing)    General MMT Assessment Detail  E 4/5  -VS (r) NA (t) VS (c)    Bed Mobility, Assessment/Treatment    Bed Mobility, Assistive Device  head of bed elevated  -VS (r) NA (t) VS (c)    Bed Mobility, Roll Left, [place]  supervision required  -VS (r) NA (t) VS (c)    Bed Mob, Supine to Sit, [place]  supervision required  -VS (r) NA (t) VS (c)    Bed Mobility, Comment  supervision secondary to safety  -VS (r) NA (t) VS (c)    Transfer Assessment/Treatment    Transfers, Bed-Chair [place]  supervision required  -VS (r) NA (t) VS (c)    Transfer, Comment  for safety  -VS (r) NA (t) VS (c)    Motor Skills/Interventions    Motor Response Observations  --   slight tremor in BUE, pt stating from being anxious  -VS (r) NA (t) VS (c)    Functional Endurance    Detail (Functional Endurance)  --   good-  -VS (r) NA (t) VS (c)    Positioning and Restraints    Pre-Treatment Position  in bed  -VS (r) NA (t) VS (c)    Post Treatment Position  chair  -VS (r) NA (t) VS (c)    In Chair  sitting;call light within reach;notified nsg;encouraged to call for assist  -VS (r) NA (t) VS (c)      01/11/18 0900 01/10/18 0504    Rehab Evaluation    Document Type evaluation  -SA     Symptoms Noted During/After Treatment none  -SA     General Information    Equipment Currently Used at Home  none  -KL    Living Environment    Lives With  spouse  -    Living Arrangements  condomini  -    Home Accessibility  no concerns  [facility]    Stair Railings at Home  inside, present at both sides  -    Type of Financial/Environmental Concern  none  -    Transportation Available  car  -    Pain Assessment    Pain Assessment No/denies pain  -SA     Cognitive Assessment/Intervention    Current Cognitive/Communication Assessment functional  -SA       User Key  (r) = Recorded By, (t) = Taken By, (c) = Cosigned By     Initials Name Provider Type    PAUL Torres, PT Physical Therapist    SA Saba Gaona, MS CCC-SLP Speech and Language Pathologist    VS Grecia Bower OTR Occupational Therapist    RAH Castanon, PT Physical Therapist    PT Vijaya Ball, MIKIE Case Manager    SV Nicolle Tran, PT Physical Therapist    LUIS EDUARDO Jamison, OT Student OT Student    JHONNY Santiago, RN Registered Nurse              PT Recommendation and Plan  Anticipated Discharge Disposition: home  PT Frequency: evaluation only  Plan of Care Review  Outcome Summary/Follow up Plan: Pt admitted from home with seizure and appears to be have returned to baseline. Pt  ambulated with  ftand was able to safely navigate stairs. Pt is safe to d/c home when medically stable. PT will sign off.               Outcome Measures       01/12/18 1000          How much help from another person do you currently need...    Turning from your back to your side while in flat bed without using bedrails? 4  -KH      Moving from lying on back to sitting on the side of a flat bed without bedrails? 4  -KH      Moving to and from a bed to a chair (including a wheelchair)? 4  -KH      Standing up from a chair using your arms (e.g., wheelchair, bedside chair)? 3  -KH      Climbing 3-5 steps with a railing? 3  -KH      To walk in hospital room? 4  -KH      AM-PAC 6 Clicks Score 22  -KH      Functional Assessment    Outcome Measure Options AM-PAC 6 Clicks Basic Mobility (PT)  -KH        User Key  (r) = Recorded By, (t) = Taken By, (c) = Cosigned By    Initials Name Provider Type    PAUL Torres, PT Physical Therapist           Time Calculation:         PT Charges       01/12/18 1052          Time Calculation    Start Time 1022  -KH      Stop Time 1038  -KH      Time Calculation (min) 16 min  -KH        User Key  (r) = Recorded By, (t) = Taken By, (c) = Cosigned By    Initials Name Provider Type    PAUL Torres, PT Physical  Therapist          Therapy Charges for Today     Code Description Service Date Service Provider Modifiers Qty    42461911806 HC PT EVAL LOW COMPLEXITY 1 1/12/2018 Ina Torres, PT GP 1    61923642530 HC PT THER PROC EA 15 MIN 1/12/2018 Ina Torres, PT GP 1          PT G-Codes  Outcome Measure Options: AM-PAC 6 Clicks Basic Mobility (PT)    PT Discharge Summary  Anticipated Discharge Disposition: home  Reason for Discharge: At baseline function  Discharge Destination: Home    Ina Torres, PT  1/12/2018

## 2018-01-12 NOTE — PROGRESS NOTES
"  PROGRESS NOTE   LOS: 2 days   Patient Care Team:  Lew Head MD as PCP - General (Internal Medicine)  Aly Rutherford MD as PCP - Claims Attributed    Chief Complaint: Possible seizure and active influenza infection with bronchospasm    Interval History: Patient was transferred to the ICU for management of a possible seizure and the respiratory symptoms.  He is doing much better and he is on room air, he had an EEG would be managed by neurology.  Still coughing and still having some wheezes and chest tightness but his shortness of breath is definitely better.   Patient was transferred out of the ICU on 1/11/18, he tested positive for the influenza and his onset of symptoms were more than 4 days but he was still started on the Tamiflu by the primary team which is okay from the pulmonary standpoint.  Patient reports significant improvement in his cough with minimal residual wheezing is on room air .  He is followed by cardiology and neurology  REVIEW OF SYSTEMS:   CARDIOVASCULAR: No chest pain, chest pressure or chest discomfort. No palpitations or edema.   RESPIRATORY: Improving shortness of breath significant reduction in the cough with minimal residual wheezes.   GASTROINTESTINAL: No anorexia, nausea, vomiting or diarrhea. No abdominal pain or blood.   HEMATOLOGIC: No bleeding or bruising.     Ventilator/Non-Invasive Ventilation Settings     None            Vital Signs  Temp:  [98 °F (36.7 °C)-99.4 °F (37.4 °C)] 98 °F (36.7 °C)  Heart Rate:  [70-87] 87  Resp:  [16-20] 18  BP: (122-165)/(68-99) 122/70  SpO2:  [91 %-99 %] 94 %  on    O2 Device: room air  No intake or output data in the 24 hours ending 01/12/18 1521  Flowsheet Rows         First Filed Value    Admission Height  177.8 cm (70\") Documented at 01/09/2018 2340    Admission Weight  96.6 kg (213 lb) Documented at 01/09/2018 2340        Body mass index is 32.04 kg/(m^2).  Last 3 weights    01/09/18  2340 01/10/18  1733   Weight: 96.6 kg (213 lb) 101 " kg (223 lb 5.2 oz)       Physical Exam:  GEN:  No acute distress, alert, cooperative, well developed   EYES:   Sclera clear. No icterus. PERRL. Normal EOM  ENT:   External ears/nose normal, no oral lesions, no thrush, mucous membranes moist  NECK:  Supple, midline trachea, no JVD  LUNGS: Normal chest on inspection,. Improved but still audible expiratory wheezes with no prolongation of expiratory phase  CV:  Regular rhythm and rate. Normal S1/S2. No murmurs, gallops, or rubs noted.  ABD:  Soft, non-tender and non-distended. Normal bowel sounds. No guarding  EXT:  Moves all extremities well. No cyanosis. No redness. No edema.   Skin: dry, intact, no bleeding    Results Review:        Results from last 7 days  Lab Units 01/12/18  0520 01/11/18 2139 01/11/18 1859 01/11/18 0448 01/09/18  2349   SODIUM mmol/L 133*  --  131* 130* 136   POTASSIUM mmol/L 3.9 3.8  --  3.1* 3.5   CHLORIDE mmol/L 97*  --   --  92* 94*   CO2 mmol/L 24.3  --   --  30.0* 27.0   BUN mg/dL 14  --   --  16 18   CREATININE mg/dL 0.88  --   --  0.89 1.15   CALCIUM mg/dL 8.0*  --   --  7.7* 9.3   AST (SGOT) U/L  --   --   --  42* 34   ALT (SGPT) U/L  --   --   --  23 26   ANION GAP mmol/L 11.7  --   --  8.0 15.0   ALBUMIN g/dL  --   --   --  3.60 4.40       Results from last 7 days  Lab Units 01/11/18  0448 01/11/18  0013 01/10/18  1036   TROPONIN T ng/mL 0.013 0.013 0.010       Results from last 7 days  Lab Units 01/11/18 0448   TSH mIU/mL 0.580       Results from last 7 days  Lab Units 01/11/18 0448   PROBNP pg/mL 1022.0       Results from last 7 days  Lab Units 01/12/18  0520 01/11/18 0448 01/09/18  2349   WBC 10*3/mm3 4.46* 5.79 9.83   HEMOGLOBIN g/dL 12.5* 11.5* 14.0   HEMATOCRIT % 37.0* 33.8* 41.2   PLATELETS 10*3/mm3 172 145 192   MCV fL 91.8 91.8 93.4   NEUTROPHIL % % 82.4* 78.8* 86.2*   LYMPHOCYTE % % 9.2* 8.3* 4.1*   MONOCYTES % % 7.8 12.1* 7.6   EOSINOPHIL % % 0.0* 0.2* 1.4   BASOPHIL % % 0.2 0.3 0.4   IMM GRAN % % 0.4 0.3 0.3        Results from last 7 days  Lab Units 01/09/18  2349   INR  1.00   APTT seconds 27.5           Results from last 7 days  Lab Units 01/11/18  0448   CHOLESTEROL mg/dL 129   TRIGLYCERIDES mg/dL 35   HDL CHOL mg/dL 48   LDL CHOL mg/dL 74           Results from last 7 days  Lab Units 01/11/18  0448   HEMOGLOBIN A1C % 5.30     Glucose   Date/Time Value Ref Range Status   01/11/2018 0013 127 70 - 130 mg/dL Final   01/10/2018 1732 143 (H) 70 - 130 mg/dL Final   01/10/2018 1201 123 70 - 130 mg/dL Final               Results from last 7 days  Lab Units 01/10/18  0026   NITRITE UA  Negative   WBC UA /HPF 0-2   BACTERIA UA /HPF None Seen   SQUAM EPITHEL UA /HPF 0-2       Results from last 7 days  Lab Units 01/10/18  1955   ADENOVIRUS DETECTION BY PCR  Not Detected   CORONAVIRUS 229E  Not Detected   CORONAVIRUS HKU1  Not Detected   CORONAVIRUS NL63  Not Detected   CORONAVIRUS OC43  Not Detected   HUMAN METAPNEUMOVIRUS  Not Detected   HUMAN RHINOVIRUS/ENTEROVIRUS  Not Detected   INFLUENZA B PCR  Not Detected   PARAINFLUENZA 1  Not Detected   PARAINFLUENZA VIRUS 2  Not Detected   PARAINFLUENZA VIRUS 3  Not Detected   PARAINFLUENZA VIRUS 4  Not Detected   BORDETELLA PERTUSSIS PCR  Not Detected   CHLAMYDOPHILA PNEUMONIAE PCR  Not Detected   MYCOPLAMA PNEUMO PCR  Not Detected   INFLUENZA A PCR  Not Detected   INFLUENZA A H3  Detected*   INFLUENZA A H1  Not Detected   RSV, PCR  Not Detected           Imaging:   Imaging Results (all)     Procedure Component Value Units Date/Time    XR Chest 2 View [[id number]] Collected:  01/10/18 0024     Updated:  01/10/18 0024    Narrative:       CHEST PA AND LATERAL.     HISTORY: Cough.     COMPARISON: 12/20/2011.     FINDINGS:  Cardiomediastinal silhouette is within normal limits.         There is no consolidation or effusion. Lung volumes are low.          Impression:       No acute findings.           CT Head Without Contrast [[id number]] Collected:  01/10/18 0145     Updated:  01/10/18 0146     Narrative:       CT SCAN OF THE BRAIN WITHOUT CONTRAST.     TECHNIQUE: Radiation dose reduction techniques were utilized, including  automated exposure control and exposure modulation based on body size.  Multiple axial images of the brain were obtained from the vertex to the  base of the brain.     HISTORY: Altered mental status. Headache.     COMPARISON: 05/30/2016.     FINDINGS:   Midline structures are within normal limits, there is no hydrocephalus.  Gray-white matter differentiation is maintained. Findings of small  vessel ischemic disease, a few old lacunar infarcts and cortical  atrophy. 1.2 cm hygroma layers the right cerebral hemisphere, no  significant change.     Orbits are within normal limits. No significant mucosal disease of the  para-nasal sinuses. Mastoid air cells are well aerated.              Impression:       No definite acute intracranial pathology. Overall no significant change.             MRI Brain With & Without Contrast [[id number]] Collected:  01/10/18 1608     Updated:  01/10/18 1639    Narrative:       MRI OF THE BRAIN WITH AND WITHOUT CONTRAST      CLINICAL HISTORY: Seizure, off-balance, confusion tonight.      TECHNIQUE: MRI of the brain was obtained with sagittal pre and  postgadolinium T1, axial pre and postgadolinium T1, coronal  postgadolinium T1, axial FLAIR, axial T2, axial diffusion, and axial  gradient echo images.     COMPARISON: Comparison is made to prior CT scan of the head dated  05/30/2016.     FINDINGS:     There are bilateral lateral cerebral hemispheric subdural hygromas. The  collection lateral to the right cerebral hemisphere measures up to 1 cm  in diameter lateral to the right parietal lobe and the collection on the  left measures up to 8 mm in diameter lateral to the left frontal lobe.  Mild mass effect is seen with sulcal effacement. There are no abnormal  areas of restricted diffusion. There are no abnormal areas of  susceptibility artifact. There are moderate  changes of chronic small  vessel ischemic phenomena. 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 The major intracranial flow related  signal voids are unremarkable. No abnormal areas of contrast enhancement  are noted. Given differences in modalities, there is no convincing  interval change when compared to the prior head CT of 05/30/2016.       Impression:          No evidence for acute intracranial pathology.     Given differences in modalities, no convincing interval change is seen  when compared to the prior head CT dated 05/30/2016.     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     Bilateral lateral cerebral hemispheric subdural hygromas resulting in  some mass effect and sulcal effacement.     This report was finalized on 1/10/2018 4:36 PM by Dr. Rocky Pitts MD.       CT Angiogram Neck With & Without Contrast [[id number]] Collected:  01/10/18 1742     Updated:  01/10/18 1759    Narrative:       CT ANGIOGRAM HEAD W CONTRAST-, CT ANGIOGRAM NECK W WO CONTRAST-     CLINICAL HISTORY: Confusion. Possible seizure. Evaluate for CVA.     TECHNIQUE: Transverse 3 mm thick images were acquired from the base of  the skull to the vertex without IV contrast. Subsequently, spiral CT  images were obtained through the neck and head during rapid IV injection  of contrast and were reconstructed in 1 mm thick axial slices. Coronal  and sagittal and 3-D reconstructions were obtained.     Radiation dose reduction techniques were utilized, including automated  exposure control and exposure modulation based on body size.     COMPARISON: MRI of the brain dated 1/10/2018. CT scan of the head dated  1/10/2018.     FINDINGS: The precontrast images demonstrate small bilateral subdural  hygromas or chronic subdural hematomas, larger in size on the right than  the left that are unchanged. Minimal right to left shift of the  midline  structures is also unchanged. No acute infarct or hemorrhage is  identified. There is a tiny chronic lacunar infarct in the left  thalamus. There is mild ill-defined diminished attenuation in the white  matter of both cerebral hemispheres consistent with sequela of mild to  moderate small vessel chronic ischemic change.     There is normal branching of the great vessels from the aortic arch that  all appear widely patent. There are no stenoses in either common carotid  artery. Calcified atherosclerotic plaque at both carotid bifurcations  resulted no NASCET significant stenoses. The vertebral arteries are  codominant and are both widely patent throughout the course in the neck  and head. There are no intracranial occlusions or stenoses. No aneurysms  are identified.     The postcontrast images demonstrate no enhancing lesions within the  brain or brainstem.        Impression:       Small bilateral subdural hygromas with minimal associated  mass effect. Evidence of mild to moderate small vessel chronic ischemic  change as described. Calcified atherosclerotic plaque at both carotid  bifurcations resulting in no significant stenoses. There are no  intracranial occlusions or stenoses.     The patient's nurse was informed that a completed corrected report was  available for review on the electronic medical record system on  1/10/2018 at 5:55 PM.     This report was finalized on 1/10/2018 5:56 PM by Dr. Kwaku Gallegos MD.       CT Angiogram Head With Contrast [[id number]] Collected:  01/10/18 1742     Updated:  01/10/18 1757    Narrative:       CT ANGIOGRAM HEAD W CONTRAST-, CT ANGIOGRAM NECK W WO CONTRAST-     CLINICAL HISTORY: Confusion. Possible seizure. Evaluate for CVA.     TECHNIQUE: Transverse 3 mm thick images were acquired from the base of  the skull to the vertex without IV contrast. Subsequently, spiral CT  images were obtained through the neck and head during rapid IV injection  of contrast and were  reconstructed in 1 mm thick axial slices. Coronal  and sagittal and 3-D reconstructions were obtained.     Radiation dose reduction techniques were utilized, including automated  exposure control and exposure modulation based on body size.     COMPARISON: MRI of the brain dated 1/10/2018. CT scan of the head dated  1/10/2018.     FINDINGS: The precontrast images demonstrate small bilateral subdural  hygromas or chronic subdural hematomas, larger in size on the right than  the left that are unchanged. Minimal right to left shift of the midline  structures is also unchanged. No acute infarct or hemorrhage is  identified. There is a tiny chronic lacunar infarct in the left  thalamus. There is mild ill-defined diminished attenuation in the white  matter of both cerebral hemispheres consistent with sequela of mild to  moderate small vessel chronic ischemic change.     There is normal branching of the great vessels from the aortic arch that  all appear widely patent. There are no stenoses in either common carotid  artery. Calcified atherosclerotic plaque at both carotid bifurcations  resulted no NASCET significant stenoses. The vertebral arteries are  codominant and are both widely patent throughout the course in the neck  and head. There are no intracranial occlusions or stenoses. No aneurysms  are identified.     The postcontrast images demonstrate no enhancing lesions within the  brain or brainstem.        Impression:       Small bilateral subdural hygromas with minimal associated  mass effect. Evidence of mild to moderate small vessel chronic ischemic  change as described. Calcified atherosclerotic plaque at both carotid  bifurcations resulting in no significant stenoses. There are no  intracranial occlusions or stenoses.     The patient's nurse was informed that a completed corrected report was  available for review on the electronic medical record system on  1/10/2018 at 5:55 PM.     This report was finalized on  1/10/2018 5:56 PM by Dr. Kwaku Gallegos MD.             I reviewed the patient's new clinical results.  I personally viewed and interpreted the patient's imaging results:        Medication Review:     amLODIPine 10 mg Oral Q24H   aspirin 325 mg Oral Daily   furosemide 20 mg Oral Daily   hydrALAZINE 75 mg Oral Q8H   ipratropium-albuterol 3 mL Nebulization TID - RT   isosorbide mononitrate 30 mg Oral Q24H   lacosamide 150 mg Oral Q12H   losartan 100 mg Oral Q24H   metoprolol succinate XL 50 mg Oral Q24H   mirtazapine 15 mg Oral Nightly   oseltamivir 75 mg Oral Q12H   pantoprazole 40 mg Oral QAM   predniSONE 20 mg Oral TID With Meals         diltiaZEM 5-15 mg/hr Last Rate: Stopped (01/10/18 1400)       ASSESSMENT:   1. Acute bronchitis with influenza H3 infection  2. Bronchospasm, doing better on bronchodilator  3. Atrial fibrillation with rapid ventricular response currently rate controlled  4. Hypertensive urgency that has resolved  5. Mental status changes with seizure being evaluated by neurology    PLAN:  We will follow-up on a when necessary basis  Patient is to continue with the prednisone at 20 mg twice a day for 2 more days and then discontinue  Patient is cleared for discharge home from the pulmonary standpoint awaiting further clearance by neuro and cardiology  Noted to follow-up with us from the pulmonary standpoint  We will be available for any question over the next couple of days until the patient is discharged    Discussed with Patient and spouse    Disposition: Depending on hospital course    Ishan Aparicio MD  01/12/18  3:21 PM        EMR Dragon/Transcription:   Much of this encounter note is an electronic transcription/translation of spoken language to printed text. The electronic translation of spoken language may permit erroneous, or at times, nonsensical words or phrases to be inadvertently transcribed; Although I have reviewed the note for such errors, some may still exist.

## 2018-01-12 NOTE — PLAN OF CARE
Problem: Fall Risk (Adult)  Goal: Absence of Falls  Outcome: Ongoing (interventions implemented as appropriate)      Problem: Patient Care Overview (Adult)  Goal: Plan of Care Review  Outcome: Ongoing (interventions implemented as appropriate)   01/12/18 0440   Coping/Psychosocial Response Interventions   Plan Of Care Reviewed With patient   Patient Care Overview   Progress improving   Outcome Evaluation   Outcome Summary/Follow up Plan up several times to bathroom with assist x 1, no complaints presented, BP in 160's early part of shift but improved overnight

## 2018-01-12 NOTE — PLAN OF CARE
Problem: Patient Care Overview (Adult)  Goal: Plan of Care Review   01/12/18 1050   Outcome Evaluation   Outcome Summary/Follow up Plan Pt admitted from home with seizure and appears to be have returned to baseline. Pt ambulated with  ft and was able to safely navigate stairs. Pt is safe to d/c home when medically stable. PT will sign off.

## 2018-01-12 NOTE — PROGRESS NOTES
"Daily progress note    Chief complaint  Doing  better  No specific complaints  Denies chest pain shortness of breath palpitation    History of present illness  77-year-old white male with history of seizure disorder for last 6 years on and that other medical problem hypertension hyperlipidemia gastroesophageal reflux disease anxiety disorder brought to the emergency room by wife when she noticed shaking spells with altered mental status.  Patient evaluated in ER to have very high blood pressure and admitted for management.  Patient admitted that he found to be in A. fib with rapid ventricular rate.  Patient denies any chest pain shortness of breath palpitation.  Patient remained fully alert oriented.  Patient feels nauseous but no vomiting.  Patient stated that he did not have any more seizures since she started on Vimpat 6 years ago.     REVIEW OF SYSTEMS  Review of Systems   Constitutional: Negative for chills and fever.   HENT: Negative for congestion and sore throat.    Eyes: Negative.    Respiratory: Negative for cough and shortness of breath.    Cardiovascular: Negative for chest pain and leg swelling.   Gastrointestinal: Negative for abdominal pain, diarrhea and vomiting.   Genitourinary: Negative for difficulty urinating and dysuria.   Musculoskeletal: Negative for back pain and neck pain.   Skin: Negative for rash and wound.   Allergic/Immunologic: Negative.    Neurological: Positive for seizures. Negative for dizziness, weakness, numbness and headaches.   Psychiatric/Behavioral: Positive for confusion.   All other systems reviewed and are negative.     PHYSICAL EXAM  Blood pressure 122/70, pulse 87, temperature 98 °F (36.7 °C), temperature source Oral, resp. rate 18, height 177.8 cm (70\"), weight 101 kg (223 lb 5.2 oz), SpO2 94 %.    Constitutional: He is oriented to person, place, and time and well-developed, well-nourished, and in no distress.   Head: Normocephalic and atraumatic.   Eyes: EOM are normal. " Pupils are equal, round, and reactive to light.   Neck: Normal range of motion. Neck supple.   Cardiovascular: Normal rate, regular rhythm and normal heart sounds.    Pulmonary/Chest: Effort normal and breath sounds normal. No respiratory distress.   Abdominal: Soft. There is no tenderness. There is no rebound and no guarding.   Musculoskeletal: Normal range of motion. He exhibits no edema.   Neurological: He is alert and oriented to person, place, and time.   Generally weak, no focal deficits.    Skin: Skin is warm and dry.   Psychiatric: Mood and affect normal.      LAB RESULTS  Lab Results (last 24 hours)     Procedure Component Value Units Date/Time    Sodium [[id number]]  (Abnormal) Collected:  01/11/18 1859    Specimen:  Blood Updated:  01/11/18 2009     Sodium 131 (L) mmol/L     Potassium [[id number]]  (Normal) Collected:  01/11/18 2139    Specimen:  Blood Updated:  01/11/18 2211     Potassium 3.8 mmol/L     CBC & Differential [[id number]] Collected:  01/12/18 0520    Specimen:  Blood Updated:  01/12/18 0558    Narrative:       The following orders were created for panel order CBC & Differential.  Procedure                               Abnormality         Status                     ---------                               -----------         ------                     CBC Auto Differential[[id number]]        Abnormal            Final result                 Please view results for these tests on the individual orders.    CBC Auto Differential [[id number]]  (Abnormal) Collected:  01/12/18 0520    Specimen:  Blood Updated:  01/12/18 0558     WBC 4.46 (L) 10*3/mm3      RBC 4.03 (L) 10*6/mm3      Hemoglobin 12.5 (L) g/dL      Hematocrit 37.0 (L) %      MCV 91.8 fL      MCH 31.0 pg      MCHC 33.8 g/dL      RDW 12.6 %      RDW-SD 42.6 fl      MPV 10.1 fL      Platelets 172 10*3/mm3      Neutrophil % 82.4 (H) %      Lymphocyte % 9.2 (L) %      Monocyte % 7.8 %      Eosinophil % 0.0 (L) %      Basophil % 0.2 %       Immature Grans % 0.4 %      Neutrophils, Absolute 3.67 10*3/mm3      Lymphocytes, Absolute 0.41 (L) 10*3/mm3      Monocytes, Absolute 0.35 10*3/mm3      Eosinophils, Absolute 0.00 10*3/mm3      Basophils, Absolute 0.01 10*3/mm3      Immature Grans, Absolute 0.02 10*3/mm3     Basic Metabolic Panel [[id number]]  (Abnormal) Collected:  01/12/18 0520    Specimen:  Blood Updated:  01/12/18 0611     Glucose 150 (H) mg/dL      BUN 14 mg/dL      Creatinine 0.88 mg/dL      Sodium 133 (L) mmol/L      Potassium 3.9 mmol/L      Chloride 97 (L) mmol/L      CO2 24.3 mmol/L      Calcium 8.0 (L) mg/dL      eGFR Non African Amer 84 mL/min/1.73      BUN/Creatinine Ratio 15.9     Anion Gap 11.7 mmol/L     Narrative:       The MDRD GFR formula is only valid for adults with stable renal function between ages 18 and 70.        Imaging Results (last 24 hours)     Procedure Component Value Units Date/Time    CT Head Without Contrast [[id number]] Collected:  01/11/18 1448     Updated:  01/11/18 1456    Narrative:       CT SCAN OF THE HEAD WITHOUT CONTRAST     CLINICAL HISTORY: Confusion. Possible seizure. Evaluate for CVA.     TECHNIQUE: CT scan of the head was obtained with 3 mm axial images. No  intravenous contrast was administered.     COMPARISON: CT scan of the head dated 01/10/2018.     FINDINGS:  Again noted are bilateral lateral cerebral hemispheric subdural  hygromas. The collection on the right is larger than that on the left  and measures up to 1 cm in diameter lateral to the right parietal lobe.  These subdural hygromas result in mild mass effect with sulcal  effacement. No interval change is noted since the prior exam. 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 Atherosclerotic changes  are identified within the intracranial vasculature.       Impression:       Stable findings when compared to the previous examination of 01/10/2018.  Again  noted are bilateral lateral cerebral hemispheric subdural hygromas  unchanged in size when compared to the prior study.      Changes of chronic small vessel ischemic phenomena and old lacunar  disease are again identified.     Radiation dose reduction techniques were utilized, including automated  exposure control and exposure modulation based on body size.     This report was finalized on 1/11/2018 2:53 PM by Dr. Rocky Pitts MD.           EKG  Atrial fibrillation with rapid ventricle rate  Nonspecific ST-T wave changes  Noted EKG for comparison      Current Facility-Administered Medications:   •  acetaminophen (TYLENOL) tablet 650 mg, 650 mg, Oral, Q4H PRN **OR** acetaminophen (TYLENOL) suppository 650 mg, 650 mg, Rectal, Q4H PRN, INA Herring  •  amLODIPine (NORVASC) tablet 10 mg, 10 mg, Oral, Q24H, James Gong MD, 10 mg at 01/12/18 1137  •  aspirin EC tablet 325 mg, 325 mg, Oral, Daily, Socorro Stewart APRN, 325 mg at 01/12/18 1138  •  clonazePAM (KlonoPIN) tablet 0.5 mg, 0.5 mg, Oral, BID PRN, James Gong MD  •  diltiaZEM (CARDIZEM) IVPB 100 mg/100 mL (1 mg/mL) NS (add-vantage), 5-15 mg/hr, Intravenous, Titrated, Colin Escoto MD, Stopped at 01/10/18 1400  •  furosemide (LASIX) tablet 20 mg, 20 mg, Oral, Daily, Socorro Stewart APRN, 20 mg at 01/12/18 1136  •  guaifenesin-dextromethorphan (MUCINEX DM) tablet 1 tablet, 1 tablet, Oral, BID PRN, Ander Sagastume MD  •  hydrALAZINE (APRESOLINE) tablet 10 mg, 10 mg, Oral, Q4H PRN, James Gong MD, 10 mg at 01/11/18 1211  •  hydrALAZINE (APRESOLINE) tablet 75 mg, 75 mg, Oral, Q8H, Socorro Stewart APRN, 75 mg at 01/12/18 0637  •  HYDROcodone-homatropine (HYCODAN) 5-1.5 MG/5ML syrup 5 mL, 5 mL, Oral, Q4H PRN, Ander Sagastume MD, 5 mL at 01/11/18 0811  •  ipratropium-albuterol (DUO-NEB) nebulizer solution 3 mL, 3 mL, Nebulization, Q4H PRN, Ander Sagastume MD  •  ipratropium-albuterol (DUO-NEB) nebulizer solution 3 mL, 3 mL, Nebulization, TID - RT, Ishan CANTRELL  MD Markus, 3 mL at 01/12/18 1057  •  isosorbide mononitrate (IMDUR) 24 hr tablet 30 mg, 30 mg, Oral, Q24H, James Gong MD, 30 mg at 01/12/18 1137  •  lacosamide (VIMPAT) tablet 150 mg, 150 mg, Oral, Q12H, Camila Lacy APRN, 150 mg at 01/12/18 1143  •  losartan (COZAAR) tablet 100 mg, 100 mg, Oral, Q24H, James Gong MD, 100 mg at 01/12/18 1138  •  metoprolol succinate XL (TOPROL-XL) 24 hr tablet 50 mg, 50 mg, Oral, Q24H, James Gong MD, 50 mg at 01/12/18 1137  •  mirtazapine (REMERON) tablet 15 mg, 15 mg, Oral, Nightly, James Gong MD, 15 mg at 01/11/18 2223  •  ondansetron (ZOFRAN) injection 4 mg, 4 mg, Intravenous, Q6H PRN, Camila Lacy APRN, 4 mg at 01/12/18 0039  •  oseltamivir (TAMIFLU) capsule 75 mg, 75 mg, Oral, Q12H, James Gong MD, 75 mg at 01/12/18 1138  •  pantoprazole (PROTONIX) EC tablet 40 mg, 40 mg, Oral, QAM, James Gong MD, 40 mg at 01/12/18 0637  •  potassium chloride (MICRO-K) CR capsule 40 mEq, 40 mEq, Oral, PRN, 40 mEq at 01/11/18 1741 **OR** potassium chloride (KLOR-CON) packet 40 mEq, 40 mEq, Oral, PRN, 40 mEq at 01/11/18 1211 **OR** potassium chloride 10 mEq in 100 mL IVPB, 10 mEq, Intravenous, Q1H PRN, Ander Sagastume MD  •  predniSONE (DELTASONE) tablet 20 mg, 20 mg, Oral, TID With Meals, Ishan Aparicio MD, 20 mg at 01/12/18 1137  •  sodium chloride 0.9 % flush 1-10 mL, 1-10 mL, Intravenous, PRN, INA Herring  •  Insert peripheral IV, , , Once **AND** sodium chloride 0.9 % flush 10 mL, 10 mL, Intravenous, PRN, Venkat Ahumada MD  •  zolpidem (AMBIEN) tablet 5 mg, 5 mg, Oral, Nightly PRN, James Gong MD     ASSESSMENT  Hypertensive urgency  Seizure disorder rule   New onset A. fib with rapid ventricle rate  Acute influenza A  Gastroesophageal reflux disease  Anxiety disorder  Dehydration    PLAN  CPM  Cardizem drip  Stabilize blood pressure  Supplement oxygen aspirin nitroglycerin and beta blocker  Tamiflu for 5 days  Increased Vimpat  Continue home medication  Stress  Cardiolite today  Stress ulcer DVT prophylaxis  Follow closely further recommendation according to hospital course    GENEVIEVE ALEJO MD

## 2018-01-12 NOTE — NURSING NOTE
Referral received through [facility] stroke screening order set.  Noted plans for discharge home.  Will sign off.

## 2018-01-13 VITALS
DIASTOLIC BLOOD PRESSURE: 81 MMHG | SYSTOLIC BLOOD PRESSURE: 141 MMHG | OXYGEN SATURATION: 97 % | WEIGHT: 223.33 LBS | BODY MASS INDEX: 31.97 KG/M2 | HEIGHT: 70 IN | TEMPERATURE: 98.4 F | HEART RATE: 95 BPM | RESPIRATION RATE: 16 BRPM

## 2018-01-13 LAB
ANION GAP SERPL CALCULATED.3IONS-SCNC: 14.1 MMOL/L
BASOPHILS # BLD AUTO: 0.01 10*3/MM3 (ref 0–0.2)
BASOPHILS NFR BLD AUTO: 0.2 % (ref 0–1.5)
BUN BLD-MCNC: 27 MG/DL (ref 8–23)
BUN/CREAT SERPL: 27 (ref 7–25)
CALCIUM SPEC-SCNC: 8.4 MG/DL (ref 8.6–10.5)
CHLORIDE SERPL-SCNC: 95 MMOL/L (ref 98–107)
CO2 SERPL-SCNC: 23.9 MMOL/L (ref 22–29)
CREAT BLD-MCNC: 1 MG/DL (ref 0.76–1.27)
DEPRECATED RDW RBC AUTO: 43.1 FL (ref 37–54)
EOSINOPHIL # BLD AUTO: 0 10*3/MM3 (ref 0–0.7)
EOSINOPHIL NFR BLD AUTO: 0 % (ref 0.3–6.2)
ERYTHROCYTE [DISTWIDTH] IN BLOOD BY AUTOMATED COUNT: 12.8 % (ref 11.5–14.5)
GFR SERPL CREATININE-BSD FRML MDRD: 73 ML/MIN/1.73
GLUCOSE BLD-MCNC: 139 MG/DL (ref 65–99)
HCT VFR BLD AUTO: 36.9 % (ref 40.4–52.2)
HGB BLD-MCNC: 12.5 G/DL (ref 13.7–17.6)
IMM GRANULOCYTES # BLD: 0 10*3/MM3 (ref 0–0.03)
IMM GRANULOCYTES NFR BLD: 0 % (ref 0–0.5)
LYMPHOCYTES # BLD AUTO: 0.57 10*3/MM3 (ref 0.9–4.8)
LYMPHOCYTES NFR BLD AUTO: 12.1 % (ref 19.6–45.3)
MCH RBC QN AUTO: 31 PG (ref 27–32.7)
MCHC RBC AUTO-ENTMCNC: 33.9 G/DL (ref 32.6–36.4)
MCV RBC AUTO: 91.6 FL (ref 79.8–96.2)
MONOCYTES # BLD AUTO: 0.46 10*3/MM3 (ref 0.2–1.2)
MONOCYTES NFR BLD AUTO: 9.7 % (ref 5–12)
NEUTROPHILS # BLD AUTO: 3.69 10*3/MM3 (ref 1.9–8.1)
NEUTROPHILS NFR BLD AUTO: 78 % (ref 42.7–76)
PLATELET # BLD AUTO: 193 10*3/MM3 (ref 140–500)
PMV BLD AUTO: 10.3 FL (ref 6–12)
POTASSIUM BLD-SCNC: 4.1 MMOL/L (ref 3.5–5.2)
RBC # BLD AUTO: 4.03 10*6/MM3 (ref 4.6–6)
SODIUM BLD-SCNC: 133 MMOL/L (ref 136–145)
WBC NRBC COR # BLD: 4.73 10*3/MM3 (ref 4.5–10.7)

## 2018-01-13 PROCEDURE — 63710000001 PREDNISONE PER 1 MG: Performed by: INTERNAL MEDICINE

## 2018-01-13 PROCEDURE — 85025 COMPLETE CBC W/AUTO DIFF WBC: CPT | Performed by: HOSPITALIST

## 2018-01-13 PROCEDURE — 80048 BASIC METABOLIC PNL TOTAL CA: CPT | Performed by: HOSPITALIST

## 2018-01-13 PROCEDURE — 94799 UNLISTED PULMONARY SVC/PX: CPT

## 2018-01-13 RX ORDER — LACOSAMIDE 150 MG/1
150 TABLET ORAL EVERY 12 HOURS SCHEDULED
Qty: 60 TABLET | Refills: 0 | Status: SHIPPED | OUTPATIENT
Start: 2018-01-13 | End: 2018-02-12

## 2018-01-13 RX ORDER — HYDRALAZINE HYDROCHLORIDE 25 MG/1
75 TABLET, FILM COATED ORAL EVERY 8 HOURS SCHEDULED
Qty: 270 TABLET | Refills: 0 | Status: SHIPPED | OUTPATIENT
Start: 2018-01-13 | End: 2018-02-12

## 2018-01-13 RX ORDER — ISOSORBIDE MONONITRATE 30 MG/1
30 TABLET, EXTENDED RELEASE ORAL
Qty: 30 TABLET | Refills: 0 | Status: SHIPPED | OUTPATIENT
Start: 2018-01-14 | End: 2018-02-14 | Stop reason: SDUPTHER

## 2018-01-13 RX ORDER — LOSARTAN POTASSIUM 100 MG/1
100 TABLET ORAL
Qty: 30 TABLET | Refills: 0 | Status: SHIPPED | OUTPATIENT
Start: 2018-01-14 | End: 2018-02-14 | Stop reason: SDUPTHER

## 2018-01-13 RX ORDER — PREDNISONE 20 MG/1
20 TABLET ORAL 2 TIMES DAILY WITH MEALS
Qty: 3 TABLET | Refills: 0 | Status: SHIPPED | OUTPATIENT
Start: 2018-01-13 | End: 2018-01-15

## 2018-01-13 RX ORDER — METOPROLOL SUCCINATE 50 MG/1
50 TABLET, EXTENDED RELEASE ORAL
Qty: 30 TABLET | Refills: 0 | Status: SHIPPED | OUTPATIENT
Start: 2018-01-14 | End: 2018-02-14 | Stop reason: SDUPTHER

## 2018-01-13 RX ORDER — AMLODIPINE BESYLATE 10 MG/1
10 TABLET ORAL
Qty: 30 TABLET | Refills: 0 | Status: SHIPPED | OUTPATIENT
Start: 2018-01-14 | End: 2018-02-13

## 2018-01-13 RX ORDER — FUROSEMIDE 20 MG/1
20 TABLET ORAL DAILY
Qty: 30 TABLET | Refills: 0 | Status: SHIPPED | OUTPATIENT
Start: 2018-01-14 | End: 2018-02-14 | Stop reason: SDUPTHER

## 2018-01-13 RX ORDER — OSELTAMIVIR PHOSPHATE 75 MG/1
75 CAPSULE ORAL EVERY 12 HOURS SCHEDULED
Qty: 5 CAPSULE | Refills: 0 | Status: SHIPPED | OUTPATIENT
Start: 2018-01-13 | End: 2018-01-16

## 2018-01-13 RX ADMIN — PANTOPRAZOLE SODIUM 40 MG: 40 TABLET, DELAYED RELEASE ORAL at 09:31

## 2018-01-13 RX ADMIN — ISOSORBIDE MONONITRATE 30 MG: 30 TABLET ORAL at 09:31

## 2018-01-13 RX ADMIN — LOSARTAN POTASSIUM 100 MG: 100 TABLET ORAL at 09:31

## 2018-01-13 RX ADMIN — PREDNISONE 20 MG: 20 TABLET ORAL at 09:30

## 2018-01-13 RX ADMIN — CLONAZEPAM 0.5 MG: 0.5 TABLET ORAL at 09:43

## 2018-01-13 RX ADMIN — FUROSEMIDE 20 MG: 20 TABLET ORAL at 09:30

## 2018-01-13 RX ADMIN — METOPROLOL SUCCINATE 50 MG: 50 TABLET, FILM COATED, EXTENDED RELEASE ORAL at 09:35

## 2018-01-13 RX ADMIN — ASPIRIN 325 MG: 325 TABLET, COATED ORAL at 09:30

## 2018-01-13 RX ADMIN — IPRATROPIUM BROMIDE AND ALBUTEROL SULFATE 3 ML: .5; 3 SOLUTION RESPIRATORY (INHALATION) at 07:45

## 2018-01-13 RX ADMIN — OSELTAMIVIR PHOSPHATE 75 MG: 75 CAPSULE ORAL at 09:30

## 2018-01-13 RX ADMIN — HYDRALAZINE HYDROCHLORIDE 75 MG: 50 TABLET, FILM COATED ORAL at 09:36

## 2018-01-13 RX ADMIN — LACOSAMIDE 150 MG: 100 TABLET, FILM COATED ORAL at 09:43

## 2018-01-13 RX ADMIN — AMLODIPINE BESYLATE 10 MG: 10 TABLET ORAL at 09:30

## 2018-01-13 NOTE — DISCHARGE SUMMARY
Discharge summary    Date of admission 1/9/2018  Date of discharge 1/13/2018    Final diagnosis  Hypertensive urgency  Seizure disorder rule   New onset A. fib with rapid ventricle rate  Acute influenza A  Gastroesophageal reflux disease  Anxiety disorder  Dehydration    Discharge medications    Current Facility-Administered Medications:   •  amLODIPine (NORVASC) tablet 10 mg, 10 mg, Oral, Q24H, James Gong MD, 10 mg at 01/13/18 0930  •  aspirin EC tablet 325 mg, 325 mg, Oral, Daily, INA Browning, 325 mg at 01/13/18 0930  •  furosemide (LASIX) tablet 20 mg, 20 mg, Oral, Daily, Socorro Stewart APRN, 20 mg at 01/13/18 0930  •  hydrALAZINE (APRESOLINE) tablet 75 mg, 75 mg, Oral, Q8H, INA Browning, 75 mg at 01/13/18 0936  •  isosorbide mononitrate (IMDUR) 24 hr tablet 30 mg, 30 mg, Oral, Q24H, James Gong MD, 30 mg at 01/13/18 0931  •  lacosamide (VIMPAT) tablet 150 mg, 150 mg, Oral, Q12H, Camila Lacy APRN, 150 mg at 01/13/18 0943  •  losartan (COZAAR) tablet 100 mg, 100 mg, Oral, Q24H, James Gong MD, 100 mg at 01/13/18 0931  •  metoprolol succinate XL (TOPROL-XL) 24 hr tablet 50 mg, 50 mg, Oral, Q24H, James Gong MD, 50 mg at 01/13/18 0935  •  mirtazapine (REMERON) tablet 15 mg, 15 mg, Oral, Nightly, James Gong MD, 15 mg at 01/12/18 2200  •  oseltamivir (TAMIFLU) capsule 75 mg, 75 mg, Oral, Q12H, James Gong MD, 75 mg at 01/13/18 0930  •  pantoprazole (PROTONIX) EC tablet 40 mg, 40 mg, Oral, QAM, James Gong MD, 40 mg at 01/13/18 0931  •  predniSONE (DELTASONE) tablet 20 mg, 20 mg, Oral, BID With Meals, Ishan Aparicio MD, 20 mg at 01/13/18 0930  •  Insert peripheral IV, , , Once **AND** sodium chloride 0.9 % flush 10 mL, 10 mL, Intravenous, PRN, Venkat Ahumada MD     Consult obtained  Cardiology  Pulmonary  Neurology    Procedures  Stress Cardiolite showed no ischemia  2-D echo within normal limits with ejection fraction 70%    Hospital course  77-year-old white male with history of seizure  disorder admitted through emergency room with automatic status.  Patient apparently have seizure-like activity at home witnessed by his wife with shaking spells or to mental status..  Patient evaluated found to have very high blood pressure and new onset A. fib admitted for management.  Patient admitted his seizures stabilized by adjusting the Vimpat.  Patient blood pressure stabilized with medication and his heart rate control with beta blocker.  Patient underwent stress Cardiolite which showed no ischemia.  Patient is 100% better but cardiology recommended no anticoagulation because of seizure disorder but take aspirin only.  Patient was discharged home on above medication he also found to have acute influenza A and received Tamiflu and steroids which will finish at home.    Discharge diet regular    Activity as tolerated but no driving until cleared by primary care doctor    Medication as above    Follow-up with primary doctor in 1 week follow-up with cardiology and urology per their instruction and take medications directed    GENEVIEVE ALEJO MD

## 2018-01-13 NOTE — PLAN OF CARE
Problem: Fall Risk (Adult)  Goal: Absence of Falls  Outcome: Ongoing (interventions implemented as appropriate)   01/12/18 1935   Fall Risk (Adult)   Absence of Falls making progress toward outcome. Continue falls precautions.    01/12/18 1935   Fall Risk (Adult)   Absence of Falls making progress toward outcome          Problem: Patient Care Overview (Adult)  Goal: Plan of Care Review  Outcome: Ongoing (interventions implemented as appropriate)   01/12/18 1935   Coping/Psychosocial Response Interventions   Plan Of Care Reviewed With patient   Patient Care Overview   Progress improving   Outcome Evaluation   Outcome Summary/Follow up Plan Pt doing much better, coughing less, decreased anxiety. Most consults signed off and possible discharge tomorrow. Pt denies pain.        Problem: Seizure Disorder/Epilepsy (Adult)  Goal: Signs and Symptoms of Listed Potential Problems Will be Absent or Manageable (Seizure Disorder/Epilepsy)  Outcome: Ongoing (interventions implemented as appropriate)   01/12/18 1935   Seizure Disorder/Epilepsy   Problems Assessed (Seizure Disorder/Epilepsy) all   Problems Present (Seizure Disorder/Epilepsy) None  Pt doing well, no seizure activity   01/12/18 1935   Seizure Disorder/Epilepsy   Problems Assessed (Seizure Disorder/Epilepsy) all   Problems Present (Seizure Disorder/Epilepsy) none    during the day.

## 2018-01-13 NOTE — PROGRESS NOTES
"Daily progress note    Chief complaint  Doing  better  No specific complaints      History of present illness  77-year-old white male with history of seizure disorder for last 6 years on and that other medical problem hypertension hyperlipidemia gastroesophageal reflux disease anxiety disorder brought to the emergency room by wife when she noticed shaking spells with altered mental status.  Patient evaluated in ER to have very high blood pressure and admitted for management.  Patient admitted that he found to be in A. fib with rapid ventricular rate.  Patient denies any chest pain shortness of breath palpitation.  Patient remained fully alert oriented.  Patient feels nauseous but no vomiting.  Patient stated that he did not have any more seizures since she started on Vimpat 6 years ago.     REVIEW OF SYSTEMS  Review of Systems   Constitutional: Negative for chills and fever.   HENT: Negative for congestion and sore throat.    Eyes: Negative.    Respiratory: Negative for cough and shortness of breath.    Cardiovascular: Negative for chest pain and leg swelling.   Gastrointestinal: Negative for abdominal pain, diarrhea and vomiting.   Genitourinary: Negative for difficulty urinating and dysuria.   Musculoskeletal: Negative for back pain and neck pain.   Skin: Negative for rash and wound.   Allergic/Immunologic: Negative.    Neurological: Positive for seizures. Negative for dizziness, weakness, numbness and headaches.   Psychiatric/Behavioral: Positive for confusion.   All other systems reviewed and are negative.     PHYSICAL EXAM  Blood pressure 141/81, pulse 95, temperature 98.4 °F (36.9 °C), temperature source Oral, resp. rate 16, height 177.8 cm (70\"), weight 101 kg (223 lb 5.2 oz), SpO2 97 %.    Constitutional: He is oriented to person, place, and time and well-developed, well-nourished, and in no distress.   Head: Normocephalic and atraumatic.   Eyes: EOM are normal. Pupils are equal, round, and reactive to " light.   Neck: Normal range of motion. Neck supple.   Cardiovascular: Normal rate, regular rhythm and normal heart sounds.    Pulmonary/Chest: Effort normal and breath sounds normal. No respiratory distress.   Abdominal: Soft. There is no tenderness. There is no rebound and no guarding.   Musculoskeletal: Normal range of motion. He exhibits no edema.   Neurological: He is alert and oriented to person, place, and time.   Generally weak, no focal deficits.    Skin: Skin is warm and dry.   Psychiatric: Mood and affect normal.      LAB RESULTS  Lab Results (last 24 hours)     Procedure Component Value Units Date/Time    CBC & Differential [[id number]] Collected:  01/13/18 0613    Specimen:  Blood Updated:  01/13/18 0650    Narrative:       The following orders were created for panel order CBC & Differential.  Procedure                               Abnormality         Status                     ---------                               -----------         ------                     CBC Auto Differential[[id number]]        Abnormal            Final result                 Please view results for these tests on the individual orders.    CBC Auto Differential [[id number]]  (Abnormal) Collected:  01/13/18 0613    Specimen:  Blood Updated:  01/13/18 0650     WBC 4.73 10*3/mm3      RBC 4.03 (L) 10*6/mm3      Hemoglobin 12.5 (L) g/dL      Hematocrit 36.9 (L) %      MCV 91.6 fL      MCH 31.0 pg      MCHC 33.9 g/dL      RDW 12.8 %      RDW-SD 43.1 fl      MPV 10.3 fL      Platelets 193 10*3/mm3      Neutrophil % 78.0 (H) %      Lymphocyte % 12.1 (L) %      Monocyte % 9.7 %      Eosinophil % 0.0 (L) %      Basophil % 0.2 %      Immature Grans % 0.0 %      Neutrophils, Absolute 3.69 10*3/mm3      Lymphocytes, Absolute 0.57 (L) 10*3/mm3      Monocytes, Absolute 0.46 10*3/mm3      Eosinophils, Absolute 0.00 10*3/mm3      Basophils, Absolute 0.01 10*3/mm3      Immature Grans, Absolute 0.00 10*3/mm3     Basic Metabolic Panel [[id number]]   (Abnormal) Collected:  01/13/18 0613    Specimen:  Blood Updated:  01/13/18 0711     Glucose 139 (H) mg/dL      BUN 27 (H) mg/dL      Creatinine 1.00 mg/dL      Sodium 133 (L) mmol/L      Potassium 4.1 mmol/L      Chloride 95 (L) mmol/L      CO2 23.9 mmol/L      Calcium 8.4 (L) mg/dL      eGFR Non African Amer 73 mL/min/1.73      BUN/Creatinine Ratio 27.0 (H)     Anion Gap 14.1 mmol/L     Narrative:       The MDRD GFR formula is only valid for adults with stable renal function between ages 18 and 70.        Imaging Results (last 24 hours)     Procedure Component Value Units Date/Time    XR Chest 2 View [[id number]] Collected:  01/10/18 0024     Updated:  01/12/18 2139    Narrative:       CHEST PA AND LATERAL.     HISTORY: Cough.     COMPARISON: 12/20/2011.     FINDINGS:  Cardiomediastinal silhouette is within normal limits.         There is no consolidation or effusion. Lung volumes are low.          Impression:       No acute findings.         This report was finalized on 1/12/2018 9:35 PM by Dr. Alonzo Kim MD.       CT Head Without Contrast [[id number]] Collected:  01/10/18 0145     Updated:  01/12/18 2142    Narrative:       CT SCAN OF THE BRAIN WITHOUT CONTRAST.     TECHNIQUE: Radiation dose reduction techniques were utilized, including  automated exposure control and exposure modulation based on body size.  Multiple axial images of the brain were obtained from the vertex to the  base of the brain.     HISTORY: Altered mental status. Headache.     COMPARISON: 05/30/2016.     FINDINGS:   Midline structures are within normal limits, there is no hydrocephalus.  Gray-white matter differentiation is maintained. Findings of small  vessel ischemic disease, a few old lacunar infarcts and cortical  atrophy. 1.2 cm hygroma layers the right cerebral hemisphere, no  significant change.     Orbits are within normal limits. No significant mucosal disease of the  para-nasal sinuses. Mastoid air cells are well aerated.               Impression:       No definite acute intracranial pathology. Overall no significant change.     This report was finalized on 1/12/2018 9:38 PM by Dr. Alonzo Kim MD.           EKG  Atrial fibrillation with rapid ventricle rate  Nonspecific ST-T wave changes  Noted EKG for comparison      Stress Cardiolite showed no skin    Current Facility-Administered Medications:   •  acetaminophen (TYLENOL) tablet 650 mg, 650 mg, Oral, Q4H PRN **OR** acetaminophen (TYLENOL) suppository 650 mg, 650 mg, Rectal, Q4H PRN, Camila Lacy APRN  •  amLODIPine (NORVASC) tablet 10 mg, 10 mg, Oral, Q24H, James Gong MD, 10 mg at 01/13/18 0930  •  aspirin EC tablet 325 mg, 325 mg, Oral, Daily, Socorro Stewart APRN, 325 mg at 01/13/18 0930  •  clonazePAM (KlonoPIN) tablet 0.5 mg, 0.5 mg, Oral, BID PRN, James Gong MD, 0.5 mg at 01/13/18 0943  •  diltiaZEM (CARDIZEM) IVPB 100 mg/100 mL (1 mg/mL) NS (add-vantage), 5-15 mg/hr, Intravenous, Titrated, Colin Escoto MD, Stopped at 01/10/18 1400  •  furosemide (LASIX) tablet 20 mg, 20 mg, Oral, Daily, Socorro Stewart APRN, 20 mg at 01/13/18 0930  •  guaifenesin-dextromethorphan (MUCINEX DM) tablet 1 tablet, 1 tablet, Oral, BID PRN, Ander Sagastume MD  •  hydrALAZINE (APRESOLINE) tablet 10 mg, 10 mg, Oral, Q4H PRN, James Gong MD, 10 mg at 01/11/18 1211  •  hydrALAZINE (APRESOLINE) tablet 75 mg, 75 mg, Oral, Q8H, Socorro Stewart APRN, 75 mg at 01/13/18 0936  •  HYDROcodone-homatropine (HYCODAN) 5-1.5 MG/5ML syrup 5 mL, 5 mL, Oral, Q4H PRN, Ander Sagastume MD, 5 mL at 01/11/18 0811  •  ipratropium-albuterol (DUO-NEB) nebulizer solution 3 mL, 3 mL, Nebulization, Q4H PRN, Ander Sagastume MD  •  ipratropium-albuterol (DUO-NEB) nebulizer solution 3 mL, 3 mL, Nebulization, TID - RT, Ishan Aparicio MD, 3 mL at 01/13/18 0745  •  isosorbide mononitrate (IMDUR) 24 hr tablet 30 mg, 30 mg, Oral, Q24H, James Gong MD, 30 mg at 01/13/18 0931  •  lacosamide (VIMPAT) tablet 150 mg, 150 mg, Oral, Q12H,  INA Herring, 150 mg at 01/13/18 0943  •  losartan (COZAAR) tablet 100 mg, 100 mg, Oral, Q24H, Genevieve Gong MD, 100 mg at 01/13/18 0931  •  metoprolol succinate XL (TOPROL-XL) 24 hr tablet 50 mg, 50 mg, Oral, Q24H, Genevieve Gong MD, 50 mg at 01/13/18 0935  •  mirtazapine (REMERON) tablet 15 mg, 15 mg, Oral, Nightly, Genevieve Gong MD, 15 mg at 01/12/18 2200  •  ondansetron (ZOFRAN) injection 4 mg, 4 mg, Intravenous, Q6H PRN, INA Herring, 4 mg at 01/12/18 0039  •  oseltamivir (TAMIFLU) capsule 75 mg, 75 mg, Oral, Q12H, Genevieve Gong MD, 75 mg at 01/13/18 0930  •  pantoprazole (PROTONIX) EC tablet 40 mg, 40 mg, Oral, QAM, Genevieve Gong MD, 40 mg at 01/13/18 0931  •  potassium chloride (MICRO-K) CR capsule 40 mEq, 40 mEq, Oral, PRN, 40 mEq at 01/11/18 1741 **OR** potassium chloride (KLOR-CON) packet 40 mEq, 40 mEq, Oral, PRN, 40 mEq at 01/11/18 1211 **OR** potassium chloride 10 mEq in 100 mL IVPB, 10 mEq, Intravenous, Q1H PRN, Ander Sagastume MD  •  predniSONE (DELTASONE) tablet 20 mg, 20 mg, Oral, BID With Meals, Ishan Aparicio MD, 20 mg at 01/13/18 0930  •  sodium chloride 0.9 % flush 1-10 mL, 1-10 mL, Intravenous, PRN, INA Herring  •  Insert peripheral IV, , , Once **AND** sodium chloride 0.9 % flush 10 mL, 10 mL, Intravenous, PRN, Venkat Ahumada MD  •  zolpidem (AMBIEN) tablet 5 mg, 5 mg, Oral, Nightly PRN, Genevieve Ahmed, MD     ASSESSMENT  Hypertensive urgency  Seizure disorder rule   New onset A. fib with rapid ventricle rate  Acute influenza A  Gastroesophageal reflux disease  Anxiety disorder  Dehydration    PLAN  Discharge home on aspirin only no anticoagulation  Discharge summary dictated    GENEVIEVE GONG MD

## 2018-01-15 NOTE — PROGRESS NOTES
Continued Stay Note  [facility]     Patient Name: Gabo Boo  MRN: [id number]  Today's Date: 1/15/2018    Admit Date: 1/9/2018          Discharge Plan       01/15/18 0815    Case Management/Social Work Plan    Plan Home    Final Note    Final Note Pt D/C'ed home with no needs              Discharge Codes       01/15/18 0815    Discharge Codes    Discharge Codes 01  Discharge to home        Expected Discharge Date and Time     Expected Discharge Date Expected Discharge Time    Jan 13, 2018             JEROME Clifford

## 2018-01-22 ENCOUNTER — TELEPHONE (OUTPATIENT)
Dept: INTERNAL MEDICINE | Facility: CLINIC | Age: 78
End: 2018-01-22

## 2018-01-22 NOTE — TELEPHONE ENCOUNTER
----- Message from Bouchra Choudhary sent at 1/22/2018  9:18 AM EST -----  Contact: pt - Dr Head pt - RE: Hospital inpt  Pt calling and would like to inform that pt was in hospital for 4 days. Pt would like to inform of this. Records are from [facility] and pt was dc'd 1 week ago. Could you please call pt to discuss? Please advise. Thanks      Pt # [phone] or [phone]

## 2018-01-23 ENCOUNTER — OFFICE VISIT (OUTPATIENT)
Dept: INTERNAL MEDICINE | Facility: CLINIC | Age: 78
End: 2018-01-23

## 2018-01-23 VITALS
HEIGHT: 70 IN | WEIGHT: 212 LBS | BODY MASS INDEX: 30.35 KG/M2 | SYSTOLIC BLOOD PRESSURE: 122 MMHG | DIASTOLIC BLOOD PRESSURE: 74 MMHG

## 2018-01-23 DIAGNOSIS — Z09 HOSPITAL DISCHARGE FOLLOW-UP: ICD-10-CM

## 2018-01-23 DIAGNOSIS — R56.9 PARTIAL SEIZURES (HCC): Chronic | ICD-10-CM

## 2018-01-23 DIAGNOSIS — G47.09 OTHER INSOMNIA: Chronic | ICD-10-CM

## 2018-01-23 DIAGNOSIS — F41.9 ANXIETY: Chronic | ICD-10-CM

## 2018-01-23 DIAGNOSIS — I10 ESSENTIAL HYPERTENSION: Primary | Chronic | ICD-10-CM

## 2018-01-23 DIAGNOSIS — E78.01 FAMILIAL HYPERCHOLESTEROLEMIA: Chronic | ICD-10-CM

## 2018-01-23 PROBLEM — E55.9 VITAMIN D DEFICIENCY: Status: ACTIVE | Noted: 2017-02-27

## 2018-01-23 PROBLEM — F39 MOOD DISORDER: Status: ACTIVE | Noted: 2017-07-27

## 2018-01-23 PROBLEM — K21.9 GASTROESOPHAGEAL REFLUX DISEASE WITHOUT ESOPHAGITIS: Status: ACTIVE | Noted: 2017-02-27

## 2018-01-23 PROCEDURE — 99214 OFFICE O/P EST MOD 30 MIN: CPT | Performed by: INTERNAL MEDICINE

## 2018-01-23 RX ORDER — MELOXICAM 15 MG/1
TABLET ORAL
COMMUNITY
Start: 2018-01-21 | End: 2018-08-29 | Stop reason: DRUGHIGH

## 2018-01-23 RX ORDER — QUETIAPINE FUMARATE 50 MG/1
50 TABLET, EXTENDED RELEASE ORAL NIGHTLY PRN
COMMUNITY
Start: 2018-01-16

## 2018-01-23 RX ORDER — RANITIDINE 150 MG/1
150 TABLET ORAL
COMMUNITY
Start: 2017-02-27 | End: 2018-03-06

## 2018-01-23 NOTE — PROGRESS NOTES
Subjective   Gabo Boo is a 77 y.o. male.     History of Present Illness     The following portions of the patient's history were reviewed and updated as appropriate: allergies, current medications, past family history, past medical history, past social history, past surgical history and problem list.  77-year-old white male with history of seizure disorder for last 6 years on and that other medical problem hypertension hyperlipidemia gastroesophageal reflux disease anxiety disorder brought to the emergency room by wife when she noticed shaking spells with altered mental status.  Patient evaluated in ER to have very high blood pressure and admitted for management.  Patient admitted that he found to be in A. fib with rapid ventricular rate.  Patient denies any chest pain shortness of breath palpitation.  Patient remained fully alert oriented. Patient is doing very well post discharge and his BP is in excellent control. His lower extremity edema has worsened over last few days and we had a pointed discussion about salt intake.   Review of Systems   Constitutional: Positive for fatigue.   HENT: Negative.    Eyes: Negative.    Respiratory: Positive for shortness of breath.    Cardiovascular: Positive for palpitations and leg swelling.   Gastrointestinal: Negative.    Endocrine: Negative.    Genitourinary: Negative.    Musculoskeletal: Positive for arthralgias.   Skin: Negative.    Allergic/Immunologic: Negative.    Neurological: Positive for seizures.   Hematological: Negative.    Psychiatric/Behavioral: Positive for dysphoric mood.       Objective   Physical Exam   Constitutional: He is oriented to person, place, and time. He appears well-developed and well-nourished.   HENT:   Head: Normocephalic and atraumatic.   Eyes: EOM are normal. Pupils are equal, round, and reactive to light.   Neck: Normal range of motion. Neck supple.   Cardiovascular: Normal rate, regular rhythm and normal heart sounds.     Pulmonary/Chest: Effort normal and breath sounds normal.   Abdominal: Soft. Bowel sounds are normal.   Musculoskeletal: He exhibits edema.   Neurological: He is alert and oriented to person, place, and time. He has normal reflexes.   Skin: Skin is warm and dry.   Psychiatric: He has a normal mood and affect. His behavior is normal. Judgment and thought content normal.   Nursing note and vitals reviewed.      Assessment/Plan   Gabo was seen today for hypertension, hyperlipidemia and anxiety.    Diagnoses and all orders for this visit:    Essential hypertension  Comments:  well controlled    Familial hypercholesterolemia  Comments:  continue current therapy    Partial seizures  Comments:  well controlled at present    Anxiety  Comments:  well controlled at present    Other insomnia  Comments:  well controlled    Hospital discharge follow-up  Comments:  have reviewed his most recent discharge notes

## 2018-01-26 ENCOUNTER — TELEPHONE (OUTPATIENT)
Dept: INTERNAL MEDICINE | Facility: CLINIC | Age: 78
End: 2018-01-26

## 2018-01-26 DIAGNOSIS — I25.10 CORONARY ARTERY DISEASE INVOLVING NATIVE HEART WITHOUT ANGINA PECTORIS, UNSPECIFIED VESSEL OR LESION TYPE: Primary | ICD-10-CM

## 2018-01-26 NOTE — TELEPHONE ENCOUNTER
----- Message from Bouchra Choudhary sent at 1/26/2018  9:36 AM EST -----  Contact: pt - Dr Head's pt - RE: referral  Pt calling and would like a return call regarding a referral for cardiologist. Pt would like to know what pt needs to do. Pt informs that was in hospital 4 days for similar issues. Could you please call pt to discuss?Could you please put referral in pt's chart? Please advise. Thanks      Pt # [phone]

## 2018-01-26 NOTE — TELEPHONE ENCOUNTER
Called patient and informed him of the referral and gave him the cardiology phone number in case he wants to make the appointment himself.Thanks

## 2018-02-06 ENCOUNTER — TELEPHONE (OUTPATIENT)
Dept: INTERNAL MEDICINE | Facility: CLINIC | Age: 78
End: 2018-02-06

## 2018-02-06 NOTE — TELEPHONE ENCOUNTER
----- Message from Nisreen Street sent at 2/6/2018  2:22 PM EST -----  Contact: Patient's wife   Patient's wife called.  She wants Dr. SANTANA to know the neurologist, Dr. Loya, has put patient on Eliquis 5 mg BID.  Also patient sees cardiologist, Dr. Matute in March.      Patient:  [phone]     Pharmacy:  [facility] [address] AT [facility] & [facility] - [phone]  - [phone] FX

## 2018-02-06 NOTE — TELEPHONE ENCOUNTER
----- Message from Nisreen Street sent at 2/6/2018 12:15 PM EST -----  Contact: Patient   Patient called and would like to speak with Dr. SANTANA.   Patient in [place] and has been complaining of ankle swelling.  He went to an Urgent Care there and the practitioner tripled his Lasix.  States swelling has almost resolved.  He says his BP has been creeping up x3 days; today at 10 AM it was 173/94, 10 minutes later it was 156/94.  Urgent care practitioner took him off amlodipine and hydralazine.  He has hx of seizure 6 years ago and again 01/08/2018.  His neurologist told him to call PCP.  Patient also concerned about AFib.  Please advise.     Patient:  [phone]    Pharmacy:  [facility] [address] [place] [address] AT [facility] & [facility] - [phone]  - [phone] FX

## 2018-02-07 ENCOUNTER — TELEPHONE (OUTPATIENT)
Dept: INTERNAL MEDICINE | Facility: CLINIC | Age: 78
End: 2018-02-07

## 2018-02-07 NOTE — TELEPHONE ENCOUNTER
----- Message from Nisreen Street sent at 2/7/2018  2:57 PM EST -----  Contact: Patient   Patient called again today requesting to speak with Dr. SANTANA.  He tells me today the urgent care in [place] has him on Lasix TID.  Patient is concerned with changes being made to med regimen, and is asking for Dr. SANTANA to call please.  Please advise.     Patient:  [phone]     Pharmacy:  [facility] [employer] [address] [place] [address] [address] AT [facility] & [facility] - [phone]  - [phone] FX

## 2018-02-13 ENCOUNTER — TELEPHONE (OUTPATIENT)
Dept: INTERNAL MEDICINE | Facility: CLINIC | Age: 78
End: 2018-02-13

## 2018-02-13 NOTE — TELEPHONE ENCOUNTER
----- Message from Bouchra Choudhary sent at 2/13/2018  8:40 AM EST -----  Contact: pt - Dr Head's pt - RE: Rx's / med's  Pt calling an would like a return call regarding call that was made last week, no response. Pt would like to change 2 medications that was given. Pt informs has not heard from office. Could you please call pt to discuss? Please advise. Thanks    Also, pt informs is out of all med's that pt was given. Pt would like to have Rx refilled. Could you please call pt to discuss? Please advise. Thanks    [facility] - [place], [place]  [phone]    Pt # [phone]

## 2018-02-13 NOTE — TELEPHONE ENCOUNTER
Spoke with patient and reviewed his recent medication changes. He is doing well with less edema and his blood pressure has been in a good range. He has a follow up appointment in 3 weeks.

## 2018-02-14 ENCOUNTER — TELEPHONE (OUTPATIENT)
Dept: INTERNAL MEDICINE | Facility: CLINIC | Age: 78
End: 2018-02-14

## 2018-02-14 RX ORDER — FUROSEMIDE 20 MG/1
20 TABLET ORAL DAILY
Qty: 30 TABLET | Refills: 3 | Status: SHIPPED | OUTPATIENT
Start: 2018-02-14 | End: 2018-03-16

## 2018-02-14 RX ORDER — LOSARTAN POTASSIUM 100 MG/1
100 TABLET ORAL
Qty: 30 TABLET | Refills: 3 | Status: SHIPPED | OUTPATIENT
Start: 2018-02-14 | End: 2018-03-06

## 2018-02-14 RX ORDER — ISOSORBIDE MONONITRATE 30 MG/1
30 TABLET, EXTENDED RELEASE ORAL
Qty: 30 TABLET | Refills: 3 | Status: SHIPPED | OUTPATIENT
Start: 2018-02-14 | End: 2018-03-16

## 2018-02-14 RX ORDER — METOPROLOL SUCCINATE 50 MG/1
50 TABLET, EXTENDED RELEASE ORAL
Qty: 30 TABLET | Refills: 3 | Status: SHIPPED | OUTPATIENT
Start: 2018-02-14 | End: 2018-03-16

## 2018-02-14 NOTE — TELEPHONE ENCOUNTER
----- Message from Myra Barajas sent at 2/14/2018  9:04 AM EST -----  Contact: Pt  Pt       metoprolol succinate XL (TOPROL-XL) 50 MG 24 hr tablet   1 a day  furosemide (LASIX) 20 MG tablet   isosorbide mononitrate (IMDUR) 30 MG 24 hr tablet   losartan (COZAAR) 100 MG tablet    Fax to [facility]  # [phone]

## 2018-02-15 ENCOUNTER — TELEPHONE (OUTPATIENT)
Dept: INTERNAL MEDICINE | Facility: CLINIC | Age: 78
End: 2018-02-15

## 2018-02-15 NOTE — TELEPHONE ENCOUNTER
----- Message from Myra Barajas sent at 2/15/2018  9:30 AM EST -----  Contact: Pt Wife Travis   Calling to get clarification as when pt was in hospital the furosemide was 3 times a day but only refilled for once a day.      Please call to discuss.  Caller# [phone]      Fax to [facility]  # [phone]

## 2018-03-06 ENCOUNTER — OFFICE VISIT (OUTPATIENT)
Dept: INTERNAL MEDICINE | Facility: CLINIC | Age: 78
End: 2018-03-06

## 2018-03-06 VITALS
BODY MASS INDEX: 30.06 KG/M2 | HEIGHT: 70 IN | WEIGHT: 210 LBS | SYSTOLIC BLOOD PRESSURE: 134 MMHG | DIASTOLIC BLOOD PRESSURE: 82 MMHG | HEART RATE: 84 BPM | OXYGEN SATURATION: 98 %

## 2018-03-06 DIAGNOSIS — I10 ESSENTIAL HYPERTENSION: Primary | Chronic | ICD-10-CM

## 2018-03-06 DIAGNOSIS — R53.83 FATIGUE, UNSPECIFIED TYPE: Chronic | ICD-10-CM

## 2018-03-06 DIAGNOSIS — E78.01 FAMILIAL HYPERCHOLESTEROLEMIA: Chronic | ICD-10-CM

## 2018-03-06 DIAGNOSIS — R60.0 LOCALIZED EDEMA: ICD-10-CM

## 2018-03-06 DIAGNOSIS — E55.9 VITAMIN D DEFICIENCY: Chronic | ICD-10-CM

## 2018-03-06 PROCEDURE — 99214 OFFICE O/P EST MOD 30 MIN: CPT | Performed by: INTERNAL MEDICINE

## 2018-03-06 RX ORDER — LOSARTAN POTASSIUM 100 MG/1
TABLET ORAL
COMMUNITY
Start: 2018-01-13 | End: 2018-06-03 | Stop reason: SDUPTHER

## 2018-03-06 RX ORDER — MELOXICAM 15 MG/1
TABLET ORAL
COMMUNITY
Start: 2018-01-21 | End: 2018-03-06 | Stop reason: SDUPTHER

## 2018-03-06 RX ORDER — FUROSEMIDE 20 MG/1
TABLET ORAL
COMMUNITY
Start: 2018-01-13 | End: 2018-03-06 | Stop reason: SDUPTHER

## 2018-03-06 RX ORDER — LACOSAMIDE 150 MG/1
150 TABLET ORAL
COMMUNITY
Start: 2018-02-14 | End: 2018-06-01 | Stop reason: DRUGHIGH

## 2018-03-06 RX ORDER — AMLODIPINE BESYLATE 10 MG/1
TABLET ORAL
COMMUNITY
Start: 2018-01-13 | End: 2018-03-06

## 2018-03-06 RX ORDER — HYDRALAZINE HYDROCHLORIDE 25 MG/1
TABLET, FILM COATED ORAL
COMMUNITY
Start: 2018-01-13 | End: 2018-03-06

## 2018-03-06 RX ORDER — METOPROLOL SUCCINATE 50 MG/1
TABLET, EXTENDED RELEASE ORAL
COMMUNITY
Start: 2018-01-13 | End: 2018-03-06 | Stop reason: SDUPTHER

## 2018-03-06 NOTE — PROGRESS NOTES
Subjective   Gabo Boo is a 77 y.o. male.     History of Present Illness     The following portions of the patient's history were reviewed and updated as appropriate: allergies, current medications, past family history, past medical history, past social history, past surgical history and problem list.  77-year-old white male with history of seizure disorder for last 6 years on and that other medical problem hypertension hyperlipidemia gastroesophageal reflux disease anxiety disorder brought to the emergency room by wife when she noticed shaking spells with altered mental status.  Patient evaluated in ER to have very high blood pressure and admitted for management.  Patient admitted that he found to be in A. fib with rapid ventricular rate.  Patient denies any chest pain shortness of breath palpitation.  Patient remained fully alert oriented. Patient is doing very well post discharge and his BP is in excellent control. His lower extremity edema has worsened over last few days and we had a pointed discussion about salt intake. Patient is here for follow up and after adjusting his diuretics and his diet and he has done remarkably well.  Review of Systems   Constitutional: Positive for fatigue.   HENT: Negative.    Eyes: Negative.    Respiratory: Negative.    Cardiovascular: Positive for leg swelling.   Gastrointestinal: Negative.    Endocrine: Negative.    Genitourinary: Negative.    Musculoskeletal: Negative.    Skin: Negative.    Allergic/Immunologic: Negative.    Neurological: Negative.    Hematological: Negative.    Psychiatric/Behavioral: Negative.        Objective   Physical Exam   Constitutional: He is oriented to person, place, and time. He appears well-developed and well-nourished.   HENT:   Head: Normocephalic and atraumatic.   Eyes: EOM are normal. Pupils are equal, round, and reactive to light.   Neck: Normal range of motion. Neck supple.   Cardiovascular: Normal rate, regular rhythm and normal  heart sounds.    Pulmonary/Chest: Effort normal and breath sounds normal.   Abdominal: Soft. Bowel sounds are normal.   Musculoskeletal: He exhibits edema.   Trace lower extremity edema   Neurological: He is alert and oriented to person, place, and time. He has normal reflexes.   Skin: Skin is warm and dry.   Psychiatric: He has a normal mood and affect. His behavior is normal. Judgment and thought content normal.   Nursing note and vitals reviewed.      Assessment/Plan   Gabo was seen today for edema.    Diagnoses and all orders for this visit:    Essential hypertension  Comments:  under better control overall    Familial hypercholesterolemia  Comments:  well controlled at presents    Vitamin D deficiency  Comments:  well controlled at present with daily supplimentation    Fatigue, unspecified type  Comments:  doing better    Localized edema  Comments:  lower extremity edema much better

## 2018-04-12 ENCOUNTER — TELEPHONE (OUTPATIENT)
Dept: INTERNAL MEDICINE | Facility: CLINIC | Age: 78
End: 2018-04-12

## 2018-04-12 NOTE — TELEPHONE ENCOUNTER
----- Message from Nisreen Street sent at 4/12/2018 11:16 AM EDT -----  Contact: Patient   I spoke with patient and informed him of appointment tomorrow, 04/13/2018 at 10:15 AM.  Patient's BP this morning was 150/98.  ER gave the patient isosorbide 30 mg BID.  I advised patient to monitor BP and to call office if any higher than this morning's reading, and advised patient to notify us of any adverse reaction to the isosorbide.  He is taking it 1 AM and 1 PM.  Thanks.

## 2018-04-12 NOTE — TELEPHONE ENCOUNTER
----- Message from Bouchra Choudhary sent at 4/12/2018  8:04 AM EDT -----  Contact: pt - Dr Head's pt - RE: NEEDS APPT TODAY per ER  Travis, spouse calling and would like to get pt appt today. Travis informs pt was in ER for VERY high B/P. Pt was informed to see PCP w/ in 24-48. Pt was dc'd  w/ B/P of 199/134. Could you please call pt to be worked in TODAY. Please advise, Thanks      Travis, spouse  # [phone] or [phone]

## 2018-04-12 NOTE — TELEPHONE ENCOUNTER
----- Message from Nisreen Street sent at 4/12/2018  2:21 PM EDT -----  Contact: Wife  Patient's wife called again stating patient's BP was back up to 186/105.  Wife advised to take patient back to ER, but she says patient refusing and stating he would take his PM dose of isosorbide and see Dr. SANTANA in the morning.  Patient advised he could not take any isosorbide this evening if he took PM dose now, to relax for rest of day, and avoid caffeine.  Return to ER if BP increased during the night.  Wife voiced understanding.  Thanks.

## 2018-04-13 ENCOUNTER — OFFICE VISIT (OUTPATIENT)
Dept: INTERNAL MEDICINE | Facility: CLINIC | Age: 78
End: 2018-04-13

## 2018-04-13 VITALS
DIASTOLIC BLOOD PRESSURE: 110 MMHG | BODY MASS INDEX: 29.92 KG/M2 | HEIGHT: 70 IN | HEART RATE: 52 BPM | WEIGHT: 209 LBS | SYSTOLIC BLOOD PRESSURE: 178 MMHG | OXYGEN SATURATION: 98 %

## 2018-04-13 DIAGNOSIS — F41.9 ANXIETY: ICD-10-CM

## 2018-04-13 DIAGNOSIS — E78.01 FAMILIAL HYPERCHOLESTEROLEMIA: Chronic | ICD-10-CM

## 2018-04-13 DIAGNOSIS — E55.9 VITAMIN D DEFICIENCY: Chronic | ICD-10-CM

## 2018-04-13 DIAGNOSIS — I10 ESSENTIAL HYPERTENSION: Primary | Chronic | ICD-10-CM

## 2018-04-13 PROCEDURE — 99214 OFFICE O/P EST MOD 30 MIN: CPT | Performed by: INTERNAL MEDICINE

## 2018-04-13 RX ORDER — CLONIDINE HYDROCHLORIDE 0.2 MG/1
0.2 TABLET ORAL EVERY 12 HOURS SCHEDULED
Qty: 120 TABLET | Refills: 3 | Status: SHIPPED | OUTPATIENT
Start: 2018-04-13 | End: 2018-05-01 | Stop reason: SDUPTHER

## 2018-04-13 RX ORDER — FUROSEMIDE 20 MG/1
20 TABLET ORAL
COMMUNITY
Start: 2018-01-13 | End: 2018-06-03 | Stop reason: SDUPTHER

## 2018-04-13 RX ORDER — ISOSORBIDE MONONITRATE 30 MG/1
60 TABLET, EXTENDED RELEASE ORAL
COMMUNITY
Start: 2018-04-11 | End: 2018-04-25

## 2018-04-13 RX ORDER — METOPROLOL SUCCINATE 50 MG/1
50 TABLET, EXTENDED RELEASE ORAL DAILY
COMMUNITY
End: 2018-06-03 | Stop reason: SDUPTHER

## 2018-04-13 NOTE — PROGRESS NOTES
Subjective   Gabo Boo is a 77 y.o. male.     History of Present Illness     The following portions of the patient's history were reviewed and updated as appropriate: allergies, current medications, past family history, past medical history, past social history, past surgical history and problem list.  77-year-old white male with history of seizure disorder for last 6 years on and that other medical problem hypertension hyperlipidemia gastroesophageal reflux disease anxiety disorder brought to the emergency room by wife when she noticed shaking spells with altered mental status.  Patient evaluated in ER to have very high blood pressure and admitted for management.  Patient admitted that he found to be in A. fib with rapid ventricular rate.  Patient denies any chest pain shortness of breath palpitation.  Patient remained fully alert oriented. Patient is doing very well post discharge and his BP is in excellent control. His lower extremity edema has worsened over last few days and we had a pointed discussion about salt intake. Patient is here for follow up and after adjusting his diuretics and his diet and he has done remarkably well. Unfortunately his blood pressure has remained highly variable and difficult to control. I recommended clonidine 0.3mg BID. He is completely assymptomatic when his blood pressure is very high and I admonished him about going to the ER when his BP reaches 200/110!!!!!!!!!!!!!!!!!!!!  Review of Systems   Constitutional: Negative.    HENT: Negative.    Eyes: Negative.    Respiratory: Negative.    Cardiovascular: Negative.    Gastrointestinal: Negative.    Endocrine: Negative.    Genitourinary: Negative.    Musculoskeletal: Negative.    Skin: Negative.    Allergic/Immunologic: Negative.    Neurological: Negative.    Hematological: Negative.    Psychiatric/Behavioral: Negative.        Objective   Physical Exam   Constitutional: He is oriented to person, place, and time. He appears  well-developed and well-nourished.   HENT:   Head: Normocephalic and atraumatic.   Eyes: EOM are normal. Pupils are equal, round, and reactive to light.   Neck: Normal range of motion. Neck supple.   Cardiovascular: Normal rate, regular rhythm and normal heart sounds.    Pulmonary/Chest: Effort normal and breath sounds normal.   Abdominal: Soft. Bowel sounds are normal.   Musculoskeletal: Normal range of motion.   Neurological: He is alert and oriented to person, place, and time. He has normal reflexes.   Skin: Skin is warm and dry.   Psychiatric: He has a normal mood and affect. His behavior is normal. Judgment and thought content normal.   Nursing note and vitals reviewed.        Assessment/Plan   Gabo was seen today for hypertension.    Diagnoses and all orders for this visit:    Essential hypertension  Comments:  still very poorly controlled at present    Familial hypercholesterolemia  Comments:  well controlled    Vitamin D deficiency  Comments:  well control    Anxiety  Comments:  well controlled    Other orders  -     CloNIDine (CATAPRES) 0.2 MG tablet; Take 1 tablet by mouth Every 12 (Twelve) Hours.

## 2018-04-20 ENCOUNTER — OFFICE VISIT (OUTPATIENT)
Dept: INTERNAL MEDICINE | Facility: CLINIC | Age: 78
End: 2018-04-20

## 2018-04-20 VITALS
SYSTOLIC BLOOD PRESSURE: 176 MMHG | DIASTOLIC BLOOD PRESSURE: 106 MMHG | WEIGHT: 212 LBS | HEIGHT: 70 IN | BODY MASS INDEX: 30.35 KG/M2

## 2018-04-20 DIAGNOSIS — E78.01 FAMILIAL HYPERCHOLESTEROLEMIA: ICD-10-CM

## 2018-04-20 DIAGNOSIS — F41.9 ANXIETY: Chronic | ICD-10-CM

## 2018-04-20 DIAGNOSIS — I10 ESSENTIAL HYPERTENSION: Primary | ICD-10-CM

## 2018-04-20 DIAGNOSIS — R56.9 SEIZURE (HCC): Chronic | ICD-10-CM

## 2018-04-20 PROCEDURE — 99214 OFFICE O/P EST MOD 30 MIN: CPT | Performed by: INTERNAL MEDICINE

## 2018-04-20 NOTE — PROGRESS NOTES
Subjective   Gabo Boo is a 77 y.o. male.     History of Present Illness     The following portions of the patient's history were reviewed and updated as appropriate: allergies, current medications, past family history, past medical history, past social history, past surgical history and problem list.  77-year-old white male with history of seizure disorder for last 6 years on and that other medical problem hypertension hyperlipidemia gastroesophageal reflux disease anxiety disorder brought to the emergency room by wife when she noticed shaking spells with altered mental status.  Patient evaluated in ER to have very high blood pressure and admitted for management.  Patient admitted that he found to be in A. fib with rapid ventricular rate.  Patient denies any chest pain shortness of breath palpitation.  Patient remained fully alert oriented. Patient is doing very well post discharge and his BP is in excellent control. His lower extremity edema has worsened over last few days and we had a pointed discussion about salt intake. Patient is here for follow up and after adjusting his diuretics and his diet and he has done remarkably well. Unfortunately his blood pressure has remained highly variable and difficult to control. I recommended clonidine 0.3mg BID. He is completely assymptomatic when his blood pressure is very high and I admonished him about going to the ER when his BP reaches 200/110!!!!!!!!!!!!!!!!!!!!  Patient's BP is slowly improving and I have recommended that he double his clonidine to 0.4 mg BID.  Review of Systems   Constitutional: Negative.    HENT: Negative.    Eyes: Negative.    Respiratory: Negative.    Cardiovascular: Negative.    Gastrointestinal: Negative.    Endocrine: Negative.    Genitourinary: Negative.    Musculoskeletal: Negative.    Skin: Negative.    Allergic/Immunologic: Negative.    Hematological: Negative.    Psychiatric/Behavioral: Negative.        Objective   Physical Exam    Constitutional: He is oriented to person, place, and time. He appears well-developed and well-nourished.   HENT:   Head: Normocephalic and atraumatic.   Eyes: EOM are normal. Pupils are equal, round, and reactive to light.   Neck: Normal range of motion. Neck supple.   Cardiovascular: Normal rate, regular rhythm and normal heart sounds.    Pulmonary/Chest: Effort normal and breath sounds normal.   Abdominal: Soft. Bowel sounds are normal.   Musculoskeletal: Normal range of motion.   Neurological: He is alert and oriented to person, place, and time. He has normal reflexes.   Skin: Skin is warm and dry.   Psychiatric: He has a normal mood and affect. His behavior is normal. Judgment and thought content normal.   Nursing note and vitals reviewed.        Assessment/Plan   Gabo was seen today for hypertension.    Diagnoses and all orders for this visit:    Essential hypertension  Comments:  increase clonidine to 0.4 mg BID    Familial hypercholesterolemia  Comments:  no change in therapy    Anxiety  Comments:  stable for now    Seizure  Comments:  no recent seizure

## 2018-05-01 ENCOUNTER — TELEPHONE (OUTPATIENT)
Dept: INTERNAL MEDICINE | Facility: CLINIC | Age: 78
End: 2018-05-01

## 2018-05-01 RX ORDER — CLONIDINE HYDROCHLORIDE 0.2 MG/1
0.2 TABLET ORAL EVERY 12 HOURS SCHEDULED
Qty: 120 TABLET | Refills: 3 | Status: SHIPPED | OUTPATIENT
Start: 2018-05-01 | End: 2018-05-14 | Stop reason: SDUPTHER

## 2018-05-01 NOTE — TELEPHONE ENCOUNTER
----- Message from Randi Mooney sent at 5/1/2018 10:41 AM EDT -----  Contact: Patient  Patient requesting refill on    CloNIDine (CATAPRES) 0.2 MG tablet     Patient states he was suppose to increase medication to 2 tablets 2 x day. Please advise    Patient:[phone]    Pharmacy:[facility]/pharmacy #[id number] - [facility] [address] AT [address] [facility] [phone] [facility] [phone]

## 2018-05-03 ENCOUNTER — OFFICE VISIT (OUTPATIENT)
Dept: INTERNAL MEDICINE | Facility: CLINIC | Age: 78
End: 2018-05-03

## 2018-05-03 VITALS
SYSTOLIC BLOOD PRESSURE: 138 MMHG | BODY MASS INDEX: 30.35 KG/M2 | HEIGHT: 70 IN | WEIGHT: 212 LBS | DIASTOLIC BLOOD PRESSURE: 94 MMHG

## 2018-05-03 DIAGNOSIS — I10 ESSENTIAL HYPERTENSION: Primary | Chronic | ICD-10-CM

## 2018-05-03 DIAGNOSIS — Z77.011 H/O LEAD EXPOSURE: ICD-10-CM

## 2018-05-03 DIAGNOSIS — R56.9 SEIZURE (HCC): Chronic | ICD-10-CM

## 2018-05-03 DIAGNOSIS — I15.0 RENOVASCULAR HYPERTENSION: ICD-10-CM

## 2018-05-03 DIAGNOSIS — E55.9 VITAMIN D DEFICIENCY: ICD-10-CM

## 2018-05-03 DIAGNOSIS — E78.01 FAMILIAL HYPERCHOLESTEROLEMIA: Chronic | ICD-10-CM

## 2018-05-03 DIAGNOSIS — R73.03 PREDIABETES: ICD-10-CM

## 2018-05-03 PROCEDURE — 99214 OFFICE O/P EST MOD 30 MIN: CPT | Performed by: INTERNAL MEDICINE

## 2018-05-03 RX ORDER — ISOSORBIDE MONONITRATE 30 MG/1
60 TABLET, EXTENDED RELEASE ORAL
COMMUNITY
Start: 2018-04-11 | End: 2018-06-06 | Stop reason: SDUPTHER

## 2018-05-03 NOTE — PROGRESS NOTES
Subjective   Gabo Boo is a 77 y.o. male.     History of Present Illness     The following portions of the patient's history were reviewed and updated as appropriate: allergies, current medications, past family history, past medical history, past social history, past surgical history and problem list.  77-year-old white male with history of seizure disorder for last 6 years on and that other medical problem hypertension hyperlipidemia gastroesophageal reflux disease anxiety disorder brought to the emergency room by wife when she noticed shaking spells with altered mental status.  Patient evaluated in ER to have very high blood pressure and admitted for management.  Patient admitted that he found to be in A. fib with rapid ventricular rate.  Patient denies any chest pain shortness of breath palpitation.  Patient remained fully alert oriented. Patient is doing very well post discharge and his BP is in excellent control. His lower extremity edema has worsened over last few days and we had a pointed discussion about salt intake. Patient is here for follow up and after adjusting his diuretics and his diet and he has done remarkably well. Unfortunately his blood pressure has remained highly variable and difficult to control. I recommended clonidine 0.3mg BID. He is completely assymptomatic when his blood pressure is very high and I admonished him about going to the ER when his BP reaches 200/110!!!!!!!!!!!!!!!!!!!!  Patient's BP is slowly improving and I have recommended that he double his clonidine to 0.4 mg BID. Patient here for follow up. Will adjust the clonidine dosage to Q 6 hours and will do a heavy metal screen due to a hx of lead exposure. Will also do a renal artery US to check for stenosis.  Review of Systems   Constitutional: Positive for fatigue.   HENT: Negative.    Eyes: Negative.    Cardiovascular: Positive for palpitations.   Gastrointestinal: Negative.    Endocrine: Negative.    Genitourinary:  Negative.    Musculoskeletal: Negative.    Skin: Negative.    Allergic/Immunologic: Negative.    Neurological: Negative.    Hematological: Negative.    Psychiatric/Behavioral: Negative.        Objective   Physical Exam   Constitutional: He is oriented to person, place, and time. He appears well-developed and well-nourished.   HENT:   Head: Normocephalic and atraumatic.   Eyes: EOM are normal. Pupils are equal, round, and reactive to light.   Neck: Normal range of motion. Neck supple.   Cardiovascular: Normal rate, regular rhythm and normal heart sounds.    Pulmonary/Chest: Effort normal and breath sounds normal.   Abdominal: Soft. Bowel sounds are normal.   Musculoskeletal: Normal range of motion.   Neurological: He is alert and oriented to person, place, and time. He has normal reflexes.   Skin: Skin is warm and dry.   Psychiatric: He has a normal mood and affect. His behavior is normal. Judgment and thought content normal.   Nursing note and vitals reviewed.        Assessment/Plan   Gabo was seen today for hypertension.    Diagnoses and all orders for this visit:    Essential hypertension  Comments:  changing clonidine to Q 6 hrs    Familial hypercholesterolemia  Comments:  well controlled    Vitamin D deficiency  Comments:  continue vitamin D     Seizure  Comments:  no recent seizures    Prediabetes  Comments:  continue to pursue a low carb diet    Renovascular hypertension  Comments:  check a renal artery us  Orders:  -     Duplex Renal Artery - Bilateral Complete CAR; Future    H/O lead exposure  Comments:  check heavy metal screen  Orders:  -     Heavy Metals Profile II, Blood; Future

## 2018-05-04 LAB
ARSENIC BLD-MCNC: 7 UG/L (ref 2–23)
CADMIUM BLD-MCNC: NORMAL UG/L (ref 0–1.2)
LEAD BLD-MCNC: NORMAL UG/DL (ref 0–19)
MERCURY BLD-MCNC: 5.9 UG/L (ref 0–14.9)

## 2018-05-14 NOTE — TELEPHONE ENCOUNTER
----- Message from Myra Barajas sent at 5/14/2018  1:05 PM EDT -----  Contact: Pt's wife Travis Fuentes needs a refill for  CloNIDine (CATAPRES) 0.2 MG tablet    But would like a phone call before, since his BP has been all over the place.    Caller# [phone]

## 2018-05-15 RX ORDER — CLONIDINE HYDROCHLORIDE 0.2 MG/1
0.2 TABLET ORAL EVERY 12 HOURS SCHEDULED
Qty: 180 TABLET | Refills: 6 | Status: SHIPPED | OUTPATIENT
Start: 2018-05-15 | End: 2018-06-01 | Stop reason: DRUGHIGH

## 2018-05-18 ENCOUNTER — TELEPHONE (OUTPATIENT)
Dept: GASTROENTEROLOGY | Facility: CLINIC | Age: 78
End: 2018-05-18

## 2018-05-18 ENCOUNTER — APPOINTMENT (OUTPATIENT)
Dept: CARDIOLOGY | Facility: HOSPITAL | Age: 78
End: 2018-05-18

## 2018-05-18 NOTE — TELEPHONE ENCOUNTER
----- Message from Arleen Goldman sent at 5/18/2018 12:38 PM EDT -----  Regarding: PT REQUESTING CALL FROM DR LAKHANI   Contact: [phone]  REGARDING COLOGUARD. HE HAS SOME QUESTIONS. CELL#[phone].

## 2018-05-23 ENCOUNTER — HOSPITAL ENCOUNTER (OUTPATIENT)
Dept: CARDIOLOGY | Facility: HOSPITAL | Age: 78
Setting detail: HOSPITAL OUTPATIENT SURGERY
Discharge: HOME | End: 2018-05-23
Admitting: INTERNAL MEDICINE

## 2018-05-23 ENCOUNTER — TELEPHONE (OUTPATIENT)
Dept: GASTROENTEROLOGY | Facility: CLINIC | Age: 78
End: 2018-05-23

## 2018-05-23 DIAGNOSIS — I15.0 RENOVASCULAR HYPERTENSION: ICD-10-CM

## 2018-05-23 LAB
BH CV ECHO MEAS - DIST REN A EDV LEFT: 12.6 CM/SEC
BH CV ECHO MEAS - DIST REN A PSV LEFT: 55.9 CM/SEC
BH CV ECHO MEAS - DIST REN A RI LEFT: 0.77
BH CV ECHO MEAS - MID REN A EDV LEFT: 24 CM/SEC
BH CV ECHO MEAS - MID REN A PSV LEFT: 87 CM/SEC
BH CV ECHO MEAS - MID REN A RI LEFT: 0.71
BH CV ECHO MEAS - PROX REN A EDV LEFT: 30.5 CM/SEC
BH CV ECHO MEAS - PROX REN A PSV LEFT: 107 CM/SEC
BH CV ECHO MEAS - PROX REN A RI LEFT: 0.71
BH CV VAS BP LEFT ARM: NORMAL MMHG
BH CV VAS BP RIGHT ARM: NORMAL MMHG
BH CV VAS RENAL AORTIC MID PSV: 80 CM/S
BH CV VAS RENAL HILUM LEFT EDV: 6 CM/S
BH CV VAS RENAL HILUM LEFT PSV: 23 CM/S
BH CV VAS RENAL HILUM RIGHT EDV: 8 CM/S
BH CV VAS RENAL HILUM RIGHT PSV: 25 CM/S
BH CV XLRA MEAS - KID L LEFT: 10.8 CM
BH CV XLRA MEAS - RENAL A ORG RI LEFT: 0.69
BH CV XLRA MEAS DIST REN A EDV RIGHT: 14.2 CM/SEC
BH CV XLRA MEAS DIST REN A PSV RIGHT: 47.8 CM/SEC
BH CV XLRA MEAS DIST REN A RI RIGHT: 0.7
BH CV XLRA MEAS KID L RIGHT: 10.7 CM
BH CV XLRA MEAS KID W RIGHT: 5.08 CM
BH CV XLRA MEAS MID REN A EDV RIGHT: 21.6 CM/SEC
BH CV XLRA MEAS MID REN A PSV RIGHT: 97.1 CM/SEC
BH CV XLRA MEAS MID REN A RI RIGHT: 0.78
BH CV XLRA MEAS PROX REN A EDV RIGHT: 24.1 CM/SEC
BH CV XLRA MEAS PROX REN A PSV RIGHT: 95.4 CM/SEC
BH CV XLRA MEAS PROX REN A RI RIGHT: 0.75
BH CV XLRA MEAS RAR LEFT: 1.33
BH CV XLRA MEAS RAR RIGHT: 1.21
BH CV XLRA MEAS RENAL A ORG EDV LEFT: 21.1 CM/SEC
BH CV XLRA MEAS RENAL A ORG EDV RIGHT: 19.5 CM/SEC
BH CV XLRA MEAS RENAL A ORG PSV LEFT: 67 CM/SEC
BH CV XLRA MEAS RENAL A ORG PSV RIGHT: 85.6 CM/SEC
BH CV XLRA MEAS RENAL A ORG RI RIGHT: 0.77
LEFT KIDNEY WIDTH: 4.91 CM
LEFT RENAL UPPER PARENCHYMA MAX: 17 CM/S
LEFT RENAL UPPER PARENCHYMA MIN: 5 CM/S
LEFT RENAL UPPER PARENCHYMA RI: 0.71
RIGHT RENAL UPPER PARENCHYMA MAX: 18 CM/S
RIGHT RENAL UPPER PARENCHYMA MIN: 5 CM/S
RIGHT RENAL UPPER PARENCHYMA RI: 0.72

## 2018-05-23 PROCEDURE — 93975 VASCULAR STUDY: CPT

## 2018-05-23 NOTE — TELEPHONE ENCOUNTER
I talked to the patient, then to Dr. Head, and then discussed with the patient again.  I will order ColoGuard for him.  He does have a history of colon polyps.  Cologuard would mean that we would not have to stop Eliquis, which is good.

## 2018-05-23 NOTE — TELEPHONE ENCOUNTER
"SA/MT - Please order ColoGuard for him (\"screening\"). I spoke to his PCP (). He has a h/o colon polyps but is on Eliquis. Getting ColoGuard means that we would not have to stop Eliquis. kjh  "

## 2018-06-01 ENCOUNTER — OFFICE VISIT (OUTPATIENT)
Dept: INTERNAL MEDICINE | Facility: CLINIC | Age: 78
End: 2018-06-01

## 2018-06-01 VITALS
SYSTOLIC BLOOD PRESSURE: 136 MMHG | DIASTOLIC BLOOD PRESSURE: 92 MMHG | HEIGHT: 70 IN | BODY MASS INDEX: 30.06 KG/M2 | WEIGHT: 210 LBS

## 2018-06-01 DIAGNOSIS — E78.01 FAMILIAL HYPERCHOLESTEROLEMIA: Chronic | ICD-10-CM

## 2018-06-01 DIAGNOSIS — F41.9 ANXIETY: Chronic | ICD-10-CM

## 2018-06-01 DIAGNOSIS — K21.9 GASTROESOPHAGEAL REFLUX DISEASE WITHOUT ESOPHAGITIS: Chronic | ICD-10-CM

## 2018-06-01 DIAGNOSIS — I10 ESSENTIAL HYPERTENSION: Primary | Chronic | ICD-10-CM

## 2018-06-01 DIAGNOSIS — E55.9 VITAMIN D DEFICIENCY: Chronic | ICD-10-CM

## 2018-06-01 PROCEDURE — 99214 OFFICE O/P EST MOD 30 MIN: CPT | Performed by: INTERNAL MEDICINE

## 2018-06-01 RX ORDER — ALBUTEROL SULFATE 90 UG/1
AEROSOL, METERED RESPIRATORY (INHALATION)
Refills: 0 | COMMUNITY
Start: 2018-05-12 | End: 2018-08-29 | Stop reason: DRUGHIGH

## 2018-06-01 RX ORDER — CLONIDINE HYDROCHLORIDE 0.2 MG/1
0.2 TABLET ORAL EVERY 6 HOURS SCHEDULED
Qty: 180 TABLET | Refills: 3 | Status: SHIPPED | OUTPATIENT
Start: 2018-06-01 | End: 2018-07-23 | Stop reason: ALTCHOICE

## 2018-06-01 RX ORDER — CLONIDINE HYDROCHLORIDE 0.2 MG/1
0.2 TABLET ORAL EVERY 6 HOURS SCHEDULED
COMMUNITY
End: 2018-06-01 | Stop reason: SDUPTHER

## 2018-06-01 RX ORDER — LACOSAMIDE 200 MG/1
200 TABLET, FILM COATED ORAL 2 TIMES DAILY
COMMUNITY
Start: 2018-05-04

## 2018-06-01 NOTE — PROGRESS NOTES
Subjective   Gabo Boo is a 77 y.o. male.     History of Present Illness     The following portions of the patient's history were reviewed and updated as appropriate: allergies, current medications, past family history, past medical history, past social history, past surgical history and problem list.  78 yo/m with accellerating HTN, anxiety, hyperlipidemia, vitamin D deficiency, gerd, here for follow up. We discussed different strategies for keeping his BP under control and I recommended a weekly catapres patch for baseline control and PO clonidine for breakthrough spikes in his blood pressure.  Review of Systems   Constitutional: Positive for fatigue.   HENT: Negative.    Eyes: Negative.    Respiratory: Negative.    Cardiovascular: Negative.    Gastrointestinal: Negative.    Endocrine: Negative.    Genitourinary: Negative.    Musculoskeletal: Negative.    Skin: Negative.    Allergic/Immunologic: Negative.    Neurological: Negative.    Hematological: Negative.    Psychiatric/Behavioral: Negative.        Objective   Physical Exam   Constitutional: He is oriented to person, place, and time. He appears well-developed and well-nourished.   HENT:   Head: Normocephalic and atraumatic.   Eyes: EOM are normal. Pupils are equal, round, and reactive to light.   Neck: Normal range of motion. Neck supple.   Cardiovascular: Normal rate, regular rhythm and normal heart sounds.    Pulmonary/Chest: Effort normal and breath sounds normal.   Abdominal: Soft. Bowel sounds are normal.   Musculoskeletal: Normal range of motion.   Neurological: He is alert and oriented to person, place, and time.   Skin: Skin is warm and dry.   Psychiatric: He has a normal mood and affect. His behavior is normal. Judgment and thought content normal.   Nursing note and vitals reviewed.        Assessment/Plan   Gabo was seen today for hypertension.    Diagnoses and all orders for this visit:    Essential hypertension  Comments:  under better  control    Familial hypercholesterolemia  Comments:  well controlled    Vitamin D deficiency  Comments:  well controlled    Gastroesophageal reflux disease without esophagitis  Comments:  well controlled at present    Anxiety  Comments:  doing well at present    Other orders  -     CloNIDine (CATAPRES) 0.2 MG tablet; Take 1 tablet by mouth Every 6 (Six) Hours. Patient takes a total of 6 pills a day.  -     CloNIDine (CATAPRES-TTS-2) 0.2 MG/24HR patch; Place 1 patch on the skin 1 (One) Time Per Week.

## 2018-06-04 RX ORDER — METOPROLOL SUCCINATE 50 MG/1
TABLET, EXTENDED RELEASE ORAL
Qty: 30 TABLET | Refills: 2 | Status: SHIPPED | OUTPATIENT
Start: 2018-06-04 | End: 2018-06-29 | Stop reason: SDUPTHER

## 2018-06-04 RX ORDER — FUROSEMIDE 20 MG/1
TABLET ORAL
Qty: 30 TABLET | Refills: 2 | Status: SHIPPED | OUTPATIENT
Start: 2018-06-04 | End: 2018-06-29 | Stop reason: SDUPTHER

## 2018-06-04 RX ORDER — LOSARTAN POTASSIUM 100 MG/1
TABLET ORAL
Qty: 30 TABLET | Refills: 2 | Status: SHIPPED | OUTPATIENT
Start: 2018-06-04 | End: 2018-06-29 | Stop reason: SDUPTHER

## 2018-06-06 RX ORDER — ISOSORBIDE MONONITRATE 30 MG/1
TABLET, EXTENDED RELEASE ORAL
Qty: 30 TABLET | Refills: 2 | Status: SHIPPED | OUTPATIENT
Start: 2018-06-06 | End: 2018-08-31 | Stop reason: SDUPTHER

## 2018-06-29 RX ORDER — FUROSEMIDE 20 MG/1
20 TABLET ORAL DAILY
Qty: 90 TABLET | Refills: 2 | Status: SHIPPED | OUTPATIENT
Start: 2018-06-29 | End: 2019-03-04 | Stop reason: SDUPTHER

## 2018-06-29 RX ORDER — LOSARTAN POTASSIUM 100 MG/1
100 TABLET ORAL DAILY
Qty: 90 TABLET | Refills: 2 | Status: SHIPPED | OUTPATIENT
Start: 2018-06-29 | End: 2019-03-04

## 2018-06-29 RX ORDER — METOPROLOL SUCCINATE 50 MG/1
50 TABLET, EXTENDED RELEASE ORAL DAILY
Qty: 90 TABLET | Refills: 2 | Status: SHIPPED | OUTPATIENT
Start: 2018-06-29 | End: 2018-07-23 | Stop reason: DRUGHIGH

## 2018-07-23 ENCOUNTER — OFFICE VISIT (OUTPATIENT)
Dept: INTERNAL MEDICINE | Facility: CLINIC | Age: 78
End: 2018-07-23

## 2018-07-23 VITALS
HEIGHT: 70 IN | BODY MASS INDEX: 29.35 KG/M2 | OXYGEN SATURATION: 98 % | HEART RATE: 49 BPM | SYSTOLIC BLOOD PRESSURE: 128 MMHG | WEIGHT: 205 LBS | DIASTOLIC BLOOD PRESSURE: 84 MMHG

## 2018-07-23 DIAGNOSIS — E78.01 FAMILIAL HYPERCHOLESTEROLEMIA: Chronic | ICD-10-CM

## 2018-07-23 DIAGNOSIS — I10 ESSENTIAL HYPERTENSION: Primary | Chronic | ICD-10-CM

## 2018-07-23 DIAGNOSIS — F39 MOOD DISORDER (HCC): Chronic | ICD-10-CM

## 2018-07-23 DIAGNOSIS — F51.01 PRIMARY INSOMNIA: ICD-10-CM

## 2018-07-23 DIAGNOSIS — E55.9 VITAMIN D DEFICIENCY: Chronic | ICD-10-CM

## 2018-07-23 PROCEDURE — 99214 OFFICE O/P EST MOD 30 MIN: CPT | Performed by: INTERNAL MEDICINE

## 2018-07-23 RX ORDER — OLMESARTAN MEDOXOMIL 40 MG/1
40 TABLET ORAL DAILY
COMMUNITY
Start: 2018-07-22 | End: 2020-01-21

## 2018-07-23 RX ORDER — AMLODIPINE BESYLATE 5 MG/1
TABLET ORAL
COMMUNITY
Start: 2018-07-22 | End: 2018-08-29 | Stop reason: DRUGHIGH

## 2018-07-23 RX ORDER — METOPROLOL SUCCINATE 25 MG/1
TABLET, EXTENDED RELEASE ORAL
COMMUNITY
Start: 2018-07-20 | End: 2019-03-04

## 2018-07-23 NOTE — PROGRESS NOTES
Subjective   Gabo Boo is a 77 y.o. male.     History of Present Illness     The following portions of the patient's history were reviewed and updated as appropriate: allergies, current medications, past family history, past medical history, past social history, past surgical history and problem list.  76 yo/m with a chief complaint of brittle HTN (relatively well controlled at present), hyperlipidemia (diet and exercise controlled), vitamin D deficiency (takes a daily supliment), mood disorder (working with a psychiatrist), here for follow up. He is also working with a cardiologist and he is working on regulating his heart rate. Will adjust his catapres patch to 0.3mg/24hr/wk for better baseline coverage.  Review of Systems   Constitutional: Positive for fatigue.   HENT: Negative.    Eyes: Negative.    Respiratory: Negative.    Cardiovascular: Negative.    Gastrointestinal: Negative.    Endocrine: Negative.    Genitourinary: Negative.    Musculoskeletal: Positive for arthralgias.   Skin: Negative.    Allergic/Immunologic: Negative.    Neurological: Negative.    Hematological: Negative.    Psychiatric/Behavioral: Positive for sleep disturbance and depressed mood.       Objective   Physical Exam   Constitutional: He is oriented to person, place, and time. He appears well-developed and well-nourished.   HENT:   Head: Normocephalic and atraumatic.   Eyes: Pupils are equal, round, and reactive to light. EOM are normal.   Neck: Normal range of motion. Neck supple.   Cardiovascular: Normal rate, regular rhythm and normal heart sounds.    Pulmonary/Chest: Effort normal and breath sounds normal.   Abdominal: Soft. Bowel sounds are normal.   Musculoskeletal: Normal range of motion.   Neurological: He is alert and oriented to person, place, and time.   Skin: Skin is warm and dry.   Psychiatric: He has a normal mood and affect. His behavior is normal. Judgment and thought content normal.   Nursing note and vitals  reviewed.        Assessment/Plan   Gabo was seen today for hypertension and hyperlipidemia.    Diagnoses and all orders for this visit:    Essential hypertension  Comments:  well controlled at present    Familial hypercholesterolemia  Comments:  diet and exercise at present    Vitamin D deficiency  Comments:  continue daily vitamin D    Mood disorder (CMS/HCC)  Comments:  working with a psychiatrist and doing well overall    Primary insomnia  Comments:  doing very well currently

## 2018-08-13 ENCOUNTER — TELEPHONE (OUTPATIENT)
Dept: INTERNAL MEDICINE | Facility: CLINIC | Age: 78
End: 2018-08-13

## 2018-08-13 NOTE — TELEPHONE ENCOUNTER
----- Message from Bouchra Choudhary sent at 8/13/2018  1:16 PM EDT -----  Contact: pt - Dr Head's pt - RE: Update on Rx  Pt calling and would like a return call regarding pt's B/P on Rx - CloNIDine (CATAPRES-TTS) 0.3 MG/24HR patch. He informs that overall, it is working quit well w/ patch and pill as needed very well. He informs doing well. He is taking  4 4 1/2 24 hrs from 6 pills every 24 hours. Takes 1 .3 mg patch, good for 1 week and giving body a baseline. Patch is very helpful with this combination. He informs will try to wean pills down some more. Pt also takes 1 & 1/2 to 2 pills @ bedtime as pain tends to migrate in the evenings. Could you please call pt if any questions or concerns? Please advise. Thanks      Pt # [phone] or C [phone]    #### Pt would like a return regarding info. Pt would like feedback.

## 2018-08-29 ENCOUNTER — OFFICE VISIT (OUTPATIENT)
Dept: INTERNAL MEDICINE | Facility: CLINIC | Age: 78
End: 2018-08-29

## 2018-08-29 VITALS
WEIGHT: 205 LBS | DIASTOLIC BLOOD PRESSURE: 80 MMHG | SYSTOLIC BLOOD PRESSURE: 120 MMHG | BODY MASS INDEX: 29.35 KG/M2 | HEIGHT: 70 IN

## 2018-08-29 DIAGNOSIS — E78.01 FAMILIAL HYPERCHOLESTEROLEMIA: ICD-10-CM

## 2018-08-29 DIAGNOSIS — K21.9 GASTROESOPHAGEAL REFLUX DISEASE WITHOUT ESOPHAGITIS: ICD-10-CM

## 2018-08-29 DIAGNOSIS — I10 ESSENTIAL HYPERTENSION: Primary | ICD-10-CM

## 2018-08-29 DIAGNOSIS — F39 MOOD DISORDER (HCC): Chronic | ICD-10-CM

## 2018-08-29 PROCEDURE — 99214 OFFICE O/P EST MOD 30 MIN: CPT | Performed by: INTERNAL MEDICINE

## 2018-08-29 RX ORDER — CLONIDINE HYDROCHLORIDE 0.2 MG/1
TABLET ORAL
Refills: 3 | COMMUNITY
Start: 2018-08-24 | End: 2018-08-29 | Stop reason: DRUGHIGH

## 2018-08-29 RX ORDER — AMLODIPINE BESYLATE 10 MG/1
10 TABLET ORAL
COMMUNITY
Start: 2018-07-24 | End: 2019-03-04

## 2018-08-29 NOTE — PROGRESS NOTES
Subjective   Gabo Boo is a 77 y.o. male.     History of Present Illness   76 yo/m with a chief complaint of brittle HTN (relatively well controlled at present), hyperlipidemia (diet and exercise controlled), vitamin D deficiency (takes a daily supliment), mood disorder (working with a psychiatrist), here for follow up. He is also working with a cardiologist and he is working on regulating his heart rate. Will adjust his catapres patch to 0.3mg/24hr/wk for better baseline coverage. In follow up he is doing amazingly well overall. His blood pressure numbers are much, much better.   The following portions of the patient's history were reviewed and updated as appropriate: allergies, current medications, past family history, past medical history, past social history, past surgical history and problem list.    Review of Systems   Constitutional: Negative.    HENT: Negative.    Eyes: Negative.    Respiratory: Negative.    Cardiovascular: Negative.    Gastrointestinal: Negative.    Endocrine: Negative.    Genitourinary: Negative.    Musculoskeletal: Negative.    Skin: Negative.    Allergic/Immunologic: Negative.    Neurological: Negative.    Hematological: Negative.    Psychiatric/Behavioral: Negative.        Objective   Physical Exam   Constitutional: He is oriented to person, place, and time. He appears well-developed and well-nourished.   HENT:   Head: Normocephalic and atraumatic.   Eyes: Pupils are equal, round, and reactive to light. EOM are normal.   Neck: Normal range of motion. Neck supple.   Cardiovascular: Normal rate, regular rhythm and normal heart sounds.    Pulmonary/Chest: Effort normal and breath sounds normal.   Abdominal: Soft. Bowel sounds are normal.   Musculoskeletal: Normal range of motion.   Neurological: He is alert and oriented to person, place, and time.   Skin: Skin is warm and dry.   Psychiatric: He has a normal mood and affect. His behavior is normal. Judgment and thought content  normal.   Nursing note and vitals reviewed.        Assessment/Plan   Gabo was seen today for hypertension and hyperlipidemia.    Diagnoses and all orders for this visit:    Essential hypertension  Comments:  doing very well with adjusting his clonidine    Familial hypercholesterolemia  Comments:  continue current therapy    Gastroesophageal reflux disease without esophagitis  Comments:  well controlled    Mood disorder (CMS/[facility])  Comments:  doing better overall

## 2018-08-31 RX ORDER — ISOSORBIDE MONONITRATE 30 MG/1
TABLET, EXTENDED RELEASE ORAL
Qty: 30 TABLET | Refills: 2 | Status: SHIPPED | OUTPATIENT
Start: 2018-08-31 | End: 2019-03-04

## 2018-09-14 RX ORDER — CLONIDINE HYDROCHLORIDE 0.2 MG/1
TABLET ORAL
Qty: 180 TABLET | Refills: 3 | Status: SHIPPED | OUTPATIENT
Start: 2018-09-14 | End: 2019-03-04

## 2018-10-08 ENCOUNTER — TELEPHONE (OUTPATIENT)
Dept: INTERNAL MEDICINE | Facility: CLINIC | Age: 78
End: 2018-10-08

## 2018-10-08 RX ORDER — CLONAZEPAM 0.5 MG/1
0.5 TABLET ORAL 2 TIMES DAILY PRN
Qty: 60 TABLET | Refills: 2 | Status: SHIPPED | OUTPATIENT
Start: 2018-10-08 | End: 2019-02-05 | Stop reason: SDUPTHER

## 2018-10-08 NOTE — TELEPHONE ENCOUNTER
----- Message from Myra Barajas sent at 10/8/2018  1:36 PM EDT -----  Contact: Pt  Pt is going out of town next Wednesday.  Will need a fill while he is gone and wanted to get it here before he leaves.  Would need an override okayed by PCP.  Dr Aly Rutherford originally fills this but has not responded to the refill request.    clonazePAM (KlonoPIN) 0.5 MG tablet  Sig - Route: Take 0.5 mg by mouth 2 (Two) Times a Day As Needed for seizures. - Oral    [id number] - [place], [place] - [address] AT [address]AR [address] - [phone]  - [phone] FX     Pt#[phone] - please call

## 2019-02-05 RX ORDER — CLONAZEPAM 0.5 MG/1
TABLET ORAL
Qty: 60 TABLET | Refills: 2 | Status: SHIPPED | OUTPATIENT
Start: 2019-02-05

## 2019-02-05 NOTE — TELEPHONE ENCOUNTER
----- Message from Myra Barajas sent at 2/5/2019  1:37 PM EST -----  Contact: pt in town  Pt is calling for a refill     FOR  clonazePAM (KlonoPIN) 0.5 MG tablet      Patient requests RX SENT TO   [id number] - [place], [place] - [address] AT [address] [facility] [phone] [facility] [phone] FX      Caller# [phone]     Please and thank you.

## 2019-03-04 ENCOUNTER — OFFICE VISIT (OUTPATIENT)
Dept: INTERNAL MEDICINE | Facility: CLINIC | Age: 79
End: 2019-03-04

## 2019-03-04 VITALS
DIASTOLIC BLOOD PRESSURE: 80 MMHG | SYSTOLIC BLOOD PRESSURE: 128 MMHG | BODY MASS INDEX: 30.06 KG/M2 | HEIGHT: 70 IN | WEIGHT: 210 LBS

## 2019-03-04 DIAGNOSIS — Z00.00 MEDICARE ANNUAL WELLNESS VISIT, INITIAL: Primary | ICD-10-CM

## 2019-03-04 DIAGNOSIS — I48.0 PAF (PAROXYSMAL ATRIAL FIBRILLATION) (HCC): ICD-10-CM

## 2019-03-04 DIAGNOSIS — I10 ESSENTIAL HYPERTENSION: ICD-10-CM

## 2019-03-04 DIAGNOSIS — E78.01 FAMILIAL HYPERCHOLESTEROLEMIA: ICD-10-CM

## 2019-03-04 LAB
CHOLEST SERPL-MCNC: 168 MG/DL (ref 0–200)
HDLC SERPL-MCNC: 51 MG/DL (ref 40–60)
LDLC SERPL CALC-MCNC: 104 MG/DL (ref 0–100)
LDLC/HDLC SERPL: 2.04 {RATIO}
TRIGL SERPL-MCNC: 66 MG/DL (ref 0–150)
VLDLC SERPL-MCNC: 13.2 MG/DL (ref 5–40)

## 2019-03-04 PROCEDURE — G0439 PPPS, SUBSEQ VISIT: HCPCS | Performed by: INTERNAL MEDICINE

## 2019-03-04 RX ORDER — CARVEDILOL 6.25 MG/1
6.25 TABLET ORAL
COMMUNITY
Start: 2019-03-02 | End: 2019-09-12

## 2019-03-04 RX ORDER — CHLORTHALIDONE 25 MG/1
TABLET ORAL
Refills: 3 | COMMUNITY
Start: 2019-02-10 | End: 2019-10-25

## 2019-03-04 RX ORDER — HYDRALAZINE HYDROCHLORIDE 10 MG/1
10 TABLET, FILM COATED ORAL AS NEEDED
Refills: 11 | COMMUNITY
Start: 2019-02-22 | End: 2019-10-25

## 2019-03-04 RX ORDER — NIACIN 1000 MG/1
1000 TABLET, EXTENDED RELEASE ORAL DAILY
COMMUNITY
End: 2019-09-12

## 2019-03-04 RX ORDER — FUROSEMIDE 40 MG/1
40 TABLET ORAL DAILY
Refills: 5 | COMMUNITY
Start: 2019-02-11 | End: 2019-03-04

## 2019-03-04 RX ORDER — CLONIDINE 0.3 MG/24H
1 PATCH, EXTENDED RELEASE TRANSDERMAL WEEKLY
Refills: 6 | COMMUNITY
Start: 2018-12-10 | End: 2019-03-04

## 2019-03-04 RX ORDER — RANITIDINE 150 MG/1
150 TABLET ORAL
COMMUNITY
Start: 2017-02-27 | End: 2019-03-04

## 2019-03-04 NOTE — PROGRESS NOTES
QUICK REFERENCE INFORMATION:  The ABCs of the Annual Wellness Visit    Initial Medicare Wellness Visit    HEALTH RISK ASSESSMENT    1940    Recent Hospitalizations:  No hospitalization(s) within the last year..        Current Medical Providers:  Patient Care Team:  Lew Head MD as PCP - General (Internal Medicine)  Lew Head MD as PCP - Claims Attributed        Smoking Status:  Social History     Tobacco Use   Smoking Status Never Smoker       Alcohol Consumption:  Social History     Substance and Sexual Activity   Alcohol Use Yes   • Alcohol/week: 1.2 oz   • Types: 1 Cans of beer, 1 Shots of liquor per week    Comment: daily       Depression Screen:   PHQ-2/PHQ-9 Depression Screening 3/4/2019   Little interest or pleasure in doing things 0   Feeling down, depressed, or hopeless 0   Trouble falling or staying asleep, or sleeping too much 0   Feeling tired or having little energy 0   Poor appetite or overeating 0   Feeling bad about yourself - or that you are a failure or have let yourself or your family down 0   Trouble concentrating on things, such as reading the newspaper or watching television 0   Moving or speaking so slowly that other people could have noticed. Or the opposite - being so fidgety or restless that you have been moving around a lot more than usual 0   Thoughts that you would be better off dead, or of hurting yourself in some way 0   Total Score 0   If you checked off any problems, how difficult have these problems made it for you to do your work, take care of things at home, or get along with other people? Not difficult at all       Health Habits and Functional and Cognitive Screening:  Functional & Cognitive Status 3/4/2019   Do you have difficulty preparing food and eating? No   Do you have difficulty bathing yourself, getting dressed or grooming yourself? No   Do you have difficulty using the toilet? No   Do you have difficulty moving around from place to place? No   Do  you have trouble with steps or getting out of a bed or a chair? No   In the past year have you fallen or experienced a near fall? No   Do you need help using the phone?  No   Are you deaf or do you have serious difficulty hearing?  No   Do you need help with transportation? No   Do you need help shopping? No   Do you need help preparing meals?  No   Do you need help with housework?  No   Do you need help with laundry? No   Do you need help taking your medications? No   Do you need help managing money? No   Do you ever drive or ride in a car without wearing a seat belt? No   Have you felt unusual stress, anger or loneliness in the last month? No   Who do you live with? Spouse   If you need help, do you have trouble finding someone available to you? No   Do you have difficulty concentrating, remembering or making decisions? No           Does the patient have evidence of cognitive impairment? Yes    Asiprin use counseling: Taking ASA appropriately as indicated      Recent Lab Results:    Visual Acuity:  No exam data present    Age-appropriate Screening Schedule:  Refer to the list below for future screening recommendations based on patient's age, sex and/or medical conditions. Orders for these recommended tests are listed in the plan section. The patient has been provided with a written plan.    Health Maintenance   Topic Date Due   • ZOSTER VACCINE (2 of 3) 02/26/2008   • COLONOSCOPY  01/22/2016   • LIPID PANEL  01/11/2019   • TDAP/TD VACCINES (2 - Td) 12/08/2020   • PNEUMOCOCCAL VACCINES (65+ LOW/MEDIUM RISK)  Completed   • INFLUENZA VACCINE  Discontinued        Subjective   History of Present Illness    Gabo Boo is a 78 y.o. male who presents for an Annual Wellness Visit.    The following portions of the patient's history were reviewed and updated as appropriate: allergies, current medications, past family history, past medical history, past social history, past surgical history and problem  list.    Outpatient Medications Prior to Visit   Medication Sig Dispense Refill   • apixaban (ELIQUIS) 5 MG tablet tablet Take 5 mg by mouth.     • carvedilol (COREG) 6.25 MG tablet      • chlorthalidone (HYGROTON) 25 MG tablet TAKE 0.5 TABLET BY MOUTH DAILY  3   • clonazePAM (KlonoPIN) 0.5 MG tablet TAKE 1 TABLET BY MOUTH TWICE A DAY AS NEEDED 60 tablet 2   • hydrALAZINE (APRESOLINE) 10 MG tablet Take 10 mg by mouth 2 (Two) Times a Day.  11   • Lacosamide (VIMPAT) 150 MG tablet Take 150 mg by mouth.     • mirtazapine (REMERON) 15 MG tablet Take 15 mg by mouth.     • niacin (NIASPAN) 1000 MG CR tablet Take 1,000 mg by mouth Daily.     • olmesartan (BENICAR) 40 MG tablet      • QUEtiapine fumarate ER (SEROquel XR) 50 MG tablet sustained-release 24 hour tablet      • amLODIPine (NORVASC) 10 MG tablet Take 10 mg by mouth.     • CloNIDine (CATAPRES) 0.2 MG tablet TAKE 1 TABLET BY MOUTH EVERY 6 HOURS .PATIENT TO TAKE 6 TABS A  tablet 3   • CloNIDine (CATAPRES-TTS) 0.3 MG/24HR patch Place 1 patch on the skin as directed by provider 1 (One) Time Per Week. 4 patch 6   • cloNIDine (CATAPRES-TTS) 0.3 MG/24HR patch 1 patch 1 (One) Time Per Week.  6   • furosemide (LASIX) 20 MG tablet Take 1 tablet by mouth Daily. 90 tablet 2   • furosemide (LASIX) 40 MG tablet Take 40 mg by mouth Daily.  5   • isosorbide mononitrate (IMDUR) 30 MG 24 hr tablet TAKE 1 TABLET BY MOUTH EVERY DAY 30 tablet 2   • losartan (COZAAR) 100 MG tablet Take 1 tablet by mouth Daily. 90 tablet 2   • metoprolol succinate XL (TOPROL-XL) 25 MG 24 hr tablet      • raNITIdine (ZANTAC) 150 MG tablet Take 150 mg by mouth.       No facility-administered medications prior to visit.        Patient Active Problem List   Diagnosis   • Anxiety   • Hyperlipidemia   • Hypertension   • Insomnia   • Prediabetes   • Fatigue   • Focal epilepsy (CMS/HCC)   • Hygroma   • Seizure (CMS/HCC)   • Mood disorder (CMS/HCC)   • Partial seizures (CMS/HCC)   • Vitamin D deficiency  "  • Hospital discharge follow-up   • Localized edema   • H/O lead exposure   • PAF (paroxysmal atrial fibrillation) (CMS/[facility])   • Medicare annual wellness visit, initial       Advance Care Planning:  power of  for healthcare on file    Identification of Risk Factors:  Risk factors include: cardiovascular risk.    Review of Systems    Compared to one year ago, the patient feels his physical health is the same.  Compared to one year ago, the patient feels his mental health is the same.    Objective     Physical Exam   Constitutional: He is oriented to person, place, and time. He appears well-developed and well-nourished.   HENT:   Head: Normocephalic and atraumatic.   Eyes: EOM are normal. Pupils are equal, round, and reactive to light.   Neck: Normal range of motion. Neck supple.   Cardiovascular: Normal rate, regular rhythm and normal heart sounds.   Pulmonary/Chest: Effort normal and breath sounds normal.   Abdominal: Soft. Bowel sounds are normal.   Musculoskeletal: Normal range of motion.   Neurological: He is alert and oriented to person, place, and time.   Skin: Skin is warm and dry.   Psychiatric: He has a normal mood and affect. His behavior is normal. Judgment and thought content normal.   Nursing note and vitals reviewed.      Vitals:    03/04/19 0948   BP: 128/80   BP Location: Left arm   Patient Position: Sitting   Cuff Size: Adult   Weight: 95.3 kg (210 lb)   Height: 177.8 cm (70\")       Patient's Body mass index is 30.13 kg/m². BMI is above normal parameters. Recommendations include: nutrition counseling.      Assessment/Plan   Patient Self-Management and Personalized Health Advice  The patient has been provided with information about: diet and preventive services including:   · Exercise counseling provided.    Visit Diagnoses:    ICD-10-CM ICD-9-CM   1. Medicare annual wellness visit, initial Z00.00 V70.0   2. PAF (paroxysmal atrial fibrillation) (CMS/[facility]) I48.0 427.31   3. Essential " hypertension I10 401.9   4. Familial hypercholesterolemia E78.01 272.0       Orders Placed This Encounter   Procedures   • Lipid Panel With LDL/HDL Ratio     Standing Status:   Future     Standing Expiration Date:   3/4/2020       Outpatient Encounter Medications as of 3/4/2019   Medication Sig Dispense Refill   • apixaban (ELIQUIS) 5 MG tablet tablet Take 5 mg by mouth.     • carvedilol (COREG) 6.25 MG tablet      • chlorthalidone (HYGROTON) 25 MG tablet TAKE 0.5 TABLET BY MOUTH DAILY  3   • clonazePAM (KlonoPIN) 0.5 MG tablet TAKE 1 TABLET BY MOUTH TWICE A DAY AS NEEDED 60 tablet 2   • hydrALAZINE (APRESOLINE) 10 MG tablet Take 10 mg by mouth 2 (Two) Times a Day.  11   • Lacosamide (VIMPAT) 150 MG tablet Take 150 mg by mouth.     • mirtazapine (REMERON) 15 MG tablet Take 15 mg by mouth.     • niacin (NIASPAN) 1000 MG CR tablet Take 1,000 mg by mouth Daily.     • olmesartan (BENICAR) 40 MG tablet      • QUEtiapine fumarate ER (SEROquel XR) 50 MG tablet sustained-release 24 hour tablet      • [DISCONTINUED] amLODIPine (NORVASC) 10 MG tablet Take 10 mg by mouth.     • [DISCONTINUED] CloNIDine (CATAPRES) 0.2 MG tablet TAKE 1 TABLET BY MOUTH EVERY 6 HOURS .PATIENT TO TAKE 6 TABS A  tablet 3   • [DISCONTINUED] CloNIDine (CATAPRES-TTS) 0.3 MG/24HR patch Place 1 patch on the skin as directed by provider 1 (One) Time Per Week. 4 patch 6   • [DISCONTINUED] cloNIDine (CATAPRES-TTS) 0.3 MG/24HR patch 1 patch 1 (One) Time Per Week.  6   • [DISCONTINUED] furosemide (LASIX) 20 MG tablet Take 1 tablet by mouth Daily. 90 tablet 2   • [DISCONTINUED] furosemide (LASIX) 40 MG tablet Take 40 mg by mouth Daily.  5   • [DISCONTINUED] isosorbide mononitrate (IMDUR) 30 MG 24 hr tablet TAKE 1 TABLET BY MOUTH EVERY DAY 30 tablet 2   • [DISCONTINUED] losartan (COZAAR) 100 MG tablet Take 1 tablet by mouth Daily. 90 tablet 2   • [DISCONTINUED] metoprolol succinate XL (TOPROL-XL) 25 MG 24 hr tablet      • [DISCONTINUED] raNITIdine  (ZANTAC) 150 MG tablet Take 150 mg by mouth.       No facility-administered encounter medications on file as of 3/4/2019.        Reviewed use of high risk medication in the elderly: yes  Reviewed for potential of harmful drug interactions in the elderly: yes    Follow Up:  Return in about 6 months (around 9/4/2019) for Next scheduled follow up.     An After Visit Summary and PPPS with all of these plans were given to the patient.

## 2019-03-05 RX ORDER — CLONIDINE 0.3 MG/24H
PATCH, EXTENDED RELEASE TRANSDERMAL
Qty: 4 PATCH | Refills: 6 | Status: SHIPPED | OUTPATIENT
Start: 2019-03-05 | End: 2019-07-10

## 2019-03-29 RX ORDER — FUROSEMIDE 20 MG/1
TABLET ORAL
Qty: 90 TABLET | Refills: 3 | Status: SHIPPED | OUTPATIENT
Start: 2019-03-29 | End: 2019-08-22

## 2019-04-18 ENCOUNTER — OFFICE VISIT (OUTPATIENT)
Dept: INTERNAL MEDICINE | Facility: CLINIC | Age: 79
End: 2019-04-18

## 2019-04-18 ENCOUNTER — APPOINTMENT (OUTPATIENT)
Dept: WOMENS IMAGING | Facility: HOSPITAL | Age: 79
End: 2019-04-18

## 2019-04-18 VITALS
OXYGEN SATURATION: 96 % | HEART RATE: 70 BPM | SYSTOLIC BLOOD PRESSURE: 136 MMHG | WEIGHT: 210 LBS | BODY MASS INDEX: 30.13 KG/M2 | DIASTOLIC BLOOD PRESSURE: 86 MMHG

## 2019-04-18 DIAGNOSIS — M79.601 ARM PAIN, ANTERIOR, RIGHT: Primary | ICD-10-CM

## 2019-04-18 PROCEDURE — 73090 X-RAY EXAM OF FOREARM: CPT | Performed by: NURSE PRACTITIONER

## 2019-04-18 PROCEDURE — 99213 OFFICE O/P EST LOW 20 MIN: CPT | Performed by: NURSE PRACTITIONER

## 2019-04-18 PROCEDURE — 73090 X-RAY EXAM OF FOREARM: CPT | Performed by: RADIOLOGY

## 2019-04-18 RX ORDER — WARFARIN SODIUM 5 MG/1
TABLET ORAL
COMMUNITY
Start: 2019-04-10 | End: 2019-07-10

## 2019-04-18 RX ORDER — AMLODIPINE BESYLATE 2.5 MG/1
2.5 TABLET ORAL DAILY
COMMUNITY
End: 2019-08-22

## 2019-04-18 RX ORDER — ASPIRIN 81 MG/1
81 TABLET, CHEWABLE ORAL DAILY
COMMUNITY
Start: 2019-03-14 | End: 2020-10-27

## 2019-04-18 NOTE — PROGRESS NOTES
Subjective   Gabo Boo is a 78 y.o. male.     He presents today with R arm pain x 5 days.  He fell [place] by [place] in [place].  He has bruising and abarasions to his R arm.      Arm Pain    The incident occurred 5 to 7 days ago. [place] The injury mechanism was a fall. The pain is present in the right forearm. The quality of the pain is described as aching. The pain is at a severity of 1/10. The pain is mild. The pain has been constant since the incident. Pertinent negatives include no chest pain, muscle weakness, numbness or tingling. Nothing aggravates the symptoms. Treatments tried: neosporin, hasn't needed tylenol and NSAIDs.        The following portions of the patient's history were reviewed and updated as appropriate: allergies, current medications, past family history, past medical history, past social history, past surgical history and problem list.    Review of Systems   Constitutional: Negative for fatigue and fever.   Respiratory: Negative for shortness of breath.    Cardiovascular: Negative for chest pain.   Skin: Positive for wound.   Neurological: Negative for tingling, weakness, numbness and headaches.       Objective   Physical Exam   Constitutional: He appears well-developed and well-nourished.   HENT:   Head: Normocephalic and atraumatic.   Cardiovascular: Normal rate, regular rhythm and normal heart sounds.   No murmur heard.  Pulmonary/Chest: Effort normal and breath sounds normal. No respiratory distress.   Musculoskeletal: Normal range of motion.        Arms:  2 healing lacerations to RFA and near R olecranon process. Mild tenderness to RFA.   Neurological: He is alert.   Skin: Skin is warm and dry.   Psychiatric: He has a normal mood and affect. His behavior is normal. Judgment and thought content normal.   Vitals reviewed.      Assessment/Plan   Gabo was seen today for arm pain.    Diagnoses and all orders for this visit:    Arm pain,  anterior, right  -     XR Forearm 2 View Right    Negative RFA xray, will send to radiology for official read.  Continue regimen of OTC antimicrobial creams for wound care.  Can use ice to decrease inflammation. Return for worsening of sx.

## 2019-07-10 ENCOUNTER — OFFICE VISIT (OUTPATIENT)
Dept: INTERNAL MEDICINE | Facility: CLINIC | Age: 79
End: 2019-07-10

## 2019-07-10 ENCOUNTER — APPOINTMENT (OUTPATIENT)
Dept: WOMENS IMAGING | Facility: HOSPITAL | Age: 79
End: 2019-07-10

## 2019-07-10 VITALS
DIASTOLIC BLOOD PRESSURE: 82 MMHG | HEIGHT: 70 IN | SYSTOLIC BLOOD PRESSURE: 130 MMHG | HEART RATE: 74 BPM | BODY MASS INDEX: 29.49 KG/M2 | OXYGEN SATURATION: 99 % | WEIGHT: 206 LBS

## 2019-07-10 DIAGNOSIS — M79.671 RIGHT FOOT PAIN: Primary | ICD-10-CM

## 2019-07-10 PROCEDURE — 73630 X-RAY EXAM OF FOOT: CPT | Performed by: NURSE PRACTITIONER

## 2019-07-10 PROCEDURE — 99213 OFFICE O/P EST LOW 20 MIN: CPT | Performed by: NURSE PRACTITIONER

## 2019-07-10 PROCEDURE — 73502 X-RAY EXAM HIP UNI 2-3 VIEWS: CPT | Performed by: RADIOLOGY
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
## 2019-07-15 ENCOUNTER — TELEPHONE (OUTPATIENT)
Dept: INTERNAL MEDICINE | Facility: CLINIC | Age: 79
End: 2019-07-15

## 2019-07-15 NOTE — TELEPHONE ENCOUNTER
----- Message from Myra Barajas sent at 7/11/2019 10:19 AM EDT -----  Contact: Pt       ----- Message -----  From: Myra Barajas  Sent: 7/11/2019  10:04 AM  To: Marychuy Hernandez MA    Pt is calling regarding   diclofenac (VOLTAREN) 3 % gel gel   States this is 900$ would like to try a PA.    Pt#[phone]    [id number] - [place], [place] - [address] AT [address]AR [address] [facility] - [phone] [facility] [phone] FX

## 2019-07-17 NOTE — TELEPHONE ENCOUNTER
Gunner with [facility] is calling regarding this medication for the patient.  Checking the status of the PA.    Caller# [phone]  Would like a call from the office todayl

## 2019-07-17 NOTE — TELEPHONE ENCOUNTER
PA has been submitted, awaiting determination thru [employer]      PCN:     [id number]  BIN:       [id number]  GRP:     [id number]    Member ID # [id number]    Pharmacy help line [phone]

## 2019-07-19 NOTE — TELEPHONE ENCOUNTER
Valeria with [facility] calling to check the status for this patient.    Wanted to know since this was denied if anything would be called in.    [phone]

## 2019-07-19 NOTE — TELEPHONE ENCOUNTER
Pharmacy left message saying the 1% is covered and should cost $3.40.  I sent in prescription and informed pt.

## 2019-07-19 NOTE — TELEPHONE ENCOUNTER
I spoke with pt to tell him the Voltaren 3% was denied, he said pharmacy told him maybe the 1% would be covered.  I have not received anything from [facility] about changing medication, so I left them a message to call or fax with info.

## 2019-08-16 ENCOUNTER — TELEPHONE (OUTPATIENT)
Dept: INTERNAL MEDICINE | Facility: CLINIC | Age: 79
End: 2019-08-16

## 2019-08-16 NOTE — TELEPHONE ENCOUNTER
----- Message from Myra Barajas sent at 8/16/2019  9:01 AM EDT -----  Contact: Pt wife Travis   Calling to see if PCP could take over cardio meds.  Currently sees Montez Matute, and still isn't balanced.    Caller# [phone]

## 2019-08-22 ENCOUNTER — OFFICE VISIT (OUTPATIENT)
Dept: INTERNAL MEDICINE | Facility: CLINIC | Age: 79
End: 2019-08-22

## 2019-08-22 VITALS
BODY MASS INDEX: 31.37 KG/M2 | DIASTOLIC BLOOD PRESSURE: 74 MMHG | WEIGHT: 207 LBS | SYSTOLIC BLOOD PRESSURE: 144 MMHG | HEIGHT: 68 IN

## 2019-08-22 DIAGNOSIS — I48.0 PAROXYSMAL ATRIAL FIBRILLATION (HCC): Primary | Chronic | ICD-10-CM

## 2019-08-22 DIAGNOSIS — I10 ESSENTIAL HYPERTENSION: ICD-10-CM

## 2019-08-22 DIAGNOSIS — E55.9 VITAMIN D DEFICIENCY: ICD-10-CM

## 2019-08-22 DIAGNOSIS — R56.9 SEIZURE (HCC): ICD-10-CM

## 2019-08-22 DIAGNOSIS — E78.01 FAMILIAL HYPERCHOLESTEROLEMIA: ICD-10-CM

## 2019-08-22 DIAGNOSIS — F39 MOOD DISORDER (HCC): ICD-10-CM

## 2019-08-22 PROCEDURE — 99214 OFFICE O/P EST MOD 30 MIN: CPT | Performed by: INTERNAL MEDICINE

## 2019-08-22 RX ORDER — LACOSAMIDE 200 MG/1
TABLET, FILM COATED ORAL
Refills: 1 | COMMUNITY
Start: 2019-08-05 | End: 2019-09-12 | Stop reason: SDUPTHER

## 2019-08-22 NOTE — PROGRESS NOTES
Subjective   Gabo Boo is a 78 y.o. male.  Presents with chief complaint of highly variable blood pressure readings, paroxysmal atrial fibrillation, mood disorder, seizure disorder, hyperlipidemia, here for evaluation and treatment.  The patient is on an extremely complex blood pressure regimen that was arrived at through the input of several physicians and I believe that his blood pressure is being over controlled currently.  I recommended eliminating the Lasix and the amlodipine with a 3-week follow-up.  In the interim he is supposed to have a Holter monitor done by his cardiologist which would be very valuable information regarding a cardiac source for his variability.    History of Present Illness having difficulty with highly variable blood pressure readings    The following portions of the patient's history were reviewed and updated as appropriate: allergies, current medications, past family history, past medical history, past social history, past surgical history and problem list.    Review of Systems   Constitutional: Positive for fatigue.   HENT: Negative.    Eyes: Negative.    Respiratory: Negative.    Cardiovascular: Negative.    Gastrointestinal: Negative.    Endocrine: Negative.    Genitourinary: Negative.    Musculoskeletal: Positive for arthralgias.   Skin: Negative.    Allergic/Immunologic: Negative.    Neurological: Negative.    Hematological: Negative.    Psychiatric/Behavioral: Negative.        Objective   Physical Exam   Constitutional: He appears well-developed and well-nourished.   HENT:   Head: Normocephalic and atraumatic.   Eyes: Pupils are equal, round, and reactive to light.   Neck: Normal range of motion.   Cardiovascular: Normal rate, regular rhythm and normal heart sounds.   Pulmonary/Chest: Effort normal and breath sounds normal.   Abdominal: Soft. Bowel sounds are normal.   Musculoskeletal: He exhibits edema.   Neurological: He is alert.   Skin: Skin is warm.   Psychiatric: He  has a normal mood and affect.   Nursing note and vitals reviewed.        Assessment/Plan   Gabo was seen today for hypertension.    Diagnoses and all orders for this visit:    Paroxysmal atrial fibrillation (CMS/HCC)  Comments:  Scheduled for a Holter monitor in 2 weeks    Essential hypertension  Comments:  A variable presentation will adjust the patient's medications by eliminating the amlodipine and the Lasix for now    Familial hypercholesterolemia  Comments:  No change in therapy    Vitamin D deficiency  Comments:  Continue vitamin D supplementation    Seizure (CMS/HCC)  Comments:  No recent seizures    Mood disorder (CMS/[facility])  Comments:  Doing well working with a psychiatrist

## 2019-09-12 ENCOUNTER — OFFICE VISIT (OUTPATIENT)
Dept: INTERNAL MEDICINE | Facility: CLINIC | Age: 79
End: 2019-09-12

## 2019-09-12 VITALS
HEIGHT: 68 IN | WEIGHT: 208 LBS | BODY MASS INDEX: 31.52 KG/M2 | DIASTOLIC BLOOD PRESSURE: 98 MMHG | SYSTOLIC BLOOD PRESSURE: 140 MMHG

## 2019-09-12 DIAGNOSIS — E78.01 FAMILIAL HYPERCHOLESTEROLEMIA: Chronic | ICD-10-CM

## 2019-09-12 DIAGNOSIS — S93.491D SPRAIN OF ANTERIOR TALOFIBULAR LIGAMENT OF RIGHT ANKLE, SUBSEQUENT ENCOUNTER: ICD-10-CM

## 2019-09-12 DIAGNOSIS — I10 ESSENTIAL HYPERTENSION: Primary | ICD-10-CM

## 2019-09-12 DIAGNOSIS — R56.9 PARTIAL SEIZURES (HCC): ICD-10-CM

## 2019-09-12 DIAGNOSIS — E55.9 VITAMIN D DEFICIENCY: Chronic | ICD-10-CM

## 2019-09-12 PROCEDURE — 99214 OFFICE O/P EST MOD 30 MIN: CPT | Performed by: INTERNAL MEDICINE

## 2019-09-12 RX ORDER — HYDRALAZINE HYDROCHLORIDE 100 MG/1
50 TABLET, FILM COATED ORAL 2 TIMES DAILY PRN
Refills: 11 | COMMUNITY
Start: 2019-08-23 | End: 2021-12-28

## 2019-09-12 NOTE — PROGRESS NOTES
Subjective   Gabo Boo is a 79 y.o. male.  Presents with chief complaint of variable hypertension that is improving with adjusting his medications, hyperlipidemia, chronic lower extremity edema with a right ankle sprain recently, hyperlipidemia, vitamin D deficiency, and complex partial seizures that have not flared up for several months.  Here for evaluation treatment and adjusted his medications.    History of Present Illness still having issues with variable hypertension    The following portions of the patient's history were reviewed and updated as appropriate: allergies, current medications, past family history, past medical history, past social history, past surgical history and problem list.    Review of Systems   Cardiovascular: Positive for leg swelling.   Musculoskeletal: Positive for arthralgias.   All other systems reviewed and are negative.      Objective   Physical Exam   Constitutional: He appears well-developed and well-nourished.   HENT:   Head: Normocephalic and atraumatic.   Eyes: EOM are normal. Pupils are equal, round, and reactive to light.   Neck: Normal range of motion. Neck supple.   Cardiovascular: Normal rate, regular rhythm and normal heart sounds.   Pulmonary/Chest: Effort normal and breath sounds normal.   Abdominal: Soft. Bowel sounds are normal.   Musculoskeletal: He exhibits edema.   Status post right ankle sprain   Neurological: He is alert.   Skin: Skin is warm.   Psychiatric: He has a normal mood and affect.   Nursing note and vitals reviewed.        Assessment/Plan   Gabo was seen today for atrial fibrillation and seizures.    Diagnoses and all orders for this visit:    Essential hypertension  Comments:  continue current meds    Familial hypercholesterolemia  Comments:  well controlled    Vitamin D deficiency  Comments:  continue vitamin D    Partial seizures (CMS/HCC)  Comments:  no recent events    Sprain of anterior talofibular ligament of right ankle, subsequent  encounter  Comments:  alternating ice and heat packs

## 2019-09-25 ENCOUNTER — TELEPHONE (OUTPATIENT)
Dept: INTERNAL MEDICINE | Facility: CLINIC | Age: 79
End: 2019-09-25

## 2019-09-25 NOTE — TELEPHONE ENCOUNTER
Patients calling in statin his BP has been running gabriela and this BP medication isn't working.    He states on 9/22/19 his BP readings were 161/96 and 161/98    On 9/24/19 BP readings were 151/93, 151/91, 170/102.    Please further advise,    Thank you

## 2019-09-26 NOTE — TELEPHONE ENCOUNTER
Pt is already taking three a day, please advise.  He is going out of town tomorrow morning.   ALSO:He would like to go to taking only ONE blood pressure medication a day.    Please advise.  Pt would like to hear back ASAP.

## 2019-09-27 NOTE — TELEPHONE ENCOUNTER
Pt and his wife informed of dr.lag dale. Advised to monitor BP , if high or having symptoms like headache or lightheded to go the ER. Offered OV with NP if needed as well.

## 2019-10-25 ENCOUNTER — OFFICE VISIT (OUTPATIENT)
Dept: INTERNAL MEDICINE | Facility: CLINIC | Age: 79
End: 2019-10-25

## 2019-10-25 ENCOUNTER — TELEPHONE (OUTPATIENT)
Dept: INTERNAL MEDICINE | Facility: CLINIC | Age: 79
End: 2019-10-25

## 2019-10-25 VITALS
HEART RATE: 76 BPM | HEIGHT: 68 IN | DIASTOLIC BLOOD PRESSURE: 82 MMHG | OXYGEN SATURATION: 98 % | WEIGHT: 198 LBS | SYSTOLIC BLOOD PRESSURE: 148 MMHG | BODY MASS INDEX: 30.01 KG/M2

## 2019-10-25 DIAGNOSIS — R56.9 SEIZURE (HCC): Chronic | ICD-10-CM

## 2019-10-25 DIAGNOSIS — E78.01 FAMILIAL HYPERCHOLESTEROLEMIA: Chronic | ICD-10-CM

## 2019-10-25 DIAGNOSIS — E55.9 VITAMIN D DEFICIENCY: Chronic | ICD-10-CM

## 2019-10-25 DIAGNOSIS — I48.0 PAROXYSMAL ATRIAL FIBRILLATION (HCC): Primary | Chronic | ICD-10-CM

## 2019-10-25 DIAGNOSIS — I10 ESSENTIAL HYPERTENSION: Chronic | ICD-10-CM

## 2019-10-25 PROCEDURE — 99214 OFFICE O/P EST MOD 30 MIN: CPT | Performed by: INTERNAL MEDICINE

## 2019-10-25 RX ORDER — NIFEDIPINE 10 MG/1
10 CAPSULE ORAL 3 TIMES DAILY
Qty: 90 CAPSULE | Refills: 3 | Status: SHIPPED | OUTPATIENT
Start: 2019-10-25 | End: 2020-02-10

## 2019-10-25 NOTE — TELEPHONE ENCOUNTER
PATIENT WOULD LIKE TO GET A CALL BACK FROM DR IQBAL OR HIS NURSE IN REGARDS TO HIS BP. PATIENT IS STATING THAT HE HAS BEEN TAKING HIS BP MEDICATION 4 PILLS A DAY FOR A COUPLE WEEKS AND IS STILL GETTING THE SAME RESULTS HAVING CHECKING HIS BP IN THE MORNING ( LOW 80's ) AND THEN AT NIGHT THEY SPIKE BACK UP TO THE HIGH 90'S-100'S. THE PATIENT ALSO STATED THAT HE HAS BEEN WATCHING HIS DIET SO HE DOESN'T THINK IT HAS ANYTHING TO DO WITH THE WAY HE IS EATING. THE PATIENT WOULD LIKE TO GET A CALL BACK TODAY [phone]

## 2019-10-28 ENCOUNTER — TELEPHONE (OUTPATIENT)
Dept: INTERNAL MEDICINE | Facility: CLINIC | Age: 79
End: 2019-10-28

## 2019-10-28 NOTE — TELEPHONE ENCOUNTER
Pt would like to a call to discuss NIFEdipine (PROCARDIA) 10 MG capsule [0192] (Order [id number]).    Gabo would like a call for more criteria on medication at [phone].

## 2019-10-28 NOTE — TELEPHONE ENCOUNTER
You are only to take the nifedipine when your blood pressure is too high not 3 times per day as we discussed

## 2019-12-18 ENCOUNTER — OFFICE VISIT (OUTPATIENT)
Dept: INTERNAL MEDICINE | Facility: CLINIC | Age: 79
End: 2019-12-18

## 2019-12-18 ENCOUNTER — TELEPHONE (OUTPATIENT)
Dept: INTERNAL MEDICINE | Facility: CLINIC | Age: 79
End: 2019-12-18

## 2019-12-18 VITALS
DIASTOLIC BLOOD PRESSURE: 70 MMHG | BODY MASS INDEX: 30.31 KG/M2 | HEART RATE: 76 BPM | HEIGHT: 68 IN | SYSTOLIC BLOOD PRESSURE: 110 MMHG | OXYGEN SATURATION: 97 % | WEIGHT: 200 LBS

## 2019-12-18 DIAGNOSIS — E55.9 VITAMIN D DEFICIENCY: ICD-10-CM

## 2019-12-18 DIAGNOSIS — I10 ESSENTIAL HYPERTENSION: ICD-10-CM

## 2019-12-18 DIAGNOSIS — L03.90 WOUND CELLULITIS: ICD-10-CM

## 2019-12-18 DIAGNOSIS — F39 MOOD DISORDER (HCC): ICD-10-CM

## 2019-12-18 DIAGNOSIS — E78.01 FAMILIAL HYPERCHOLESTEROLEMIA: ICD-10-CM

## 2019-12-18 DIAGNOSIS — I48.0 PAROXYSMAL ATRIAL FIBRILLATION (HCC): Primary | ICD-10-CM

## 2019-12-18 PROCEDURE — 99214 OFFICE O/P EST MOD 30 MIN: CPT | Performed by: INTERNAL MEDICINE

## 2019-12-18 RX ORDER — LORAZEPAM 1 MG/1
1 TABLET ORAL DAILY
Qty: 30 TABLET | Refills: 4 | Status: SHIPPED | OUTPATIENT
Start: 2019-12-18 | End: 2020-01-21

## 2019-12-18 RX ORDER — AMOXICILLIN AND CLAVULANATE POTASSIUM 875; 125 MG/1; MG/1
1 TABLET, FILM COATED ORAL 2 TIMES DAILY
Qty: 14 TABLET | Refills: 0 | Status: SHIPPED | OUTPATIENT
Start: 2019-12-18 | End: 2019-12-25

## 2019-12-18 NOTE — TELEPHONE ENCOUNTER
Issue resolved, spoke to Randi (manger at [facility] ) and will work pt in tomorrow either 9:15 am or 11:00.  She will call the pt and let him know.

## 2019-12-18 NOTE — TELEPHONE ENCOUNTER
Patient called about an issue with the referral that Dr. Head placed for him to be seen at a wound clinic. The patient said that Dr. Head placed this referral with urgency, but they called him today (12/18/19) and informed him that they would not be able to see him until 1/3/20. The patient said that he needs to be seen either tomorrow (12/19/19) or Friday (12/20/19), so he is requesting for Dr. Head to call on his behalf and arrange this for him or to place another referral with another wound clinic provider. Please call the patient at [phone] when this message has been received and advise.

## 2019-12-19 ENCOUNTER — LAB (OUTPATIENT)
Dept: LAB | Facility: HOSPITAL | Age: 79
End: 2019-12-19

## 2019-12-19 ENCOUNTER — TRANSCRIBE ORDERS (OUTPATIENT)
Dept: ADMINISTRATIVE | Facility: HOSPITAL | Age: 79
End: 2019-12-19

## 2019-12-19 ENCOUNTER — OFFICE VISIT (OUTPATIENT)
Dept: WOUND CARE | Facility: HOSPITAL | Age: 79
End: 2019-12-19

## 2019-12-19 ENCOUNTER — HOSPITAL ENCOUNTER (OUTPATIENT)
Dept: GENERAL RADIOLOGY | Facility: HOSPITAL | Age: 79
Discharge: HOME | End: 2019-12-19
Admitting: NURSE PRACTITIONER

## 2019-12-19 DIAGNOSIS — L97.909 IDIOPATHIC CHRONIC VENOUS HYPERTENSION OF LOWER EXTREMITY WITH ULCER, UNSPECIFIED LATERALITY (HCC): ICD-10-CM

## 2019-12-19 DIAGNOSIS — L97.919 CHRONIC VENOUS HYPERTENSION (IDIOPATHIC) WITH ULCER OF RIGHT LOWER EXTREMITY (HCC): ICD-10-CM

## 2019-12-19 DIAGNOSIS — I87.311 CHRONIC VENOUS HYPERTENSION (IDIOPATHIC) WITH ULCER OF RIGHT LOWER EXTREMITY (HCC): ICD-10-CM

## 2019-12-19 DIAGNOSIS — I87.319 IDIOPATHIC CHRONIC VENOUS HYPERTENSION OF LOWER EXTREMITY WITH ULCER, UNSPECIFIED LATERALITY (HCC): ICD-10-CM

## 2019-12-19 DIAGNOSIS — L97.812 ULCER OF RIGHT PRETIBIAL REGION, WITH FAT LAYER EXPOSED (HCC): Primary | ICD-10-CM

## 2019-12-19 DIAGNOSIS — L97.812 ULCER OF RIGHT PRETIBIAL REGION, WITH FAT LAYER EXPOSED (HCC): ICD-10-CM

## 2019-12-19 LAB
ALBUMIN SERPL-MCNC: 4.4 G/DL (ref 3.5–5.2)
ALBUMIN/GLOB SERPL: 1.5 G/DL
ALP SERPL-CCNC: 88 U/L (ref 39–117)
ALT SERPL W P-5'-P-CCNC: 21 U/L (ref 1–41)
ANION GAP SERPL CALCULATED.3IONS-SCNC: 12.8 MMOL/L (ref 5–15)
AST SERPL-CCNC: 28 U/L (ref 1–40)
BILIRUB SERPL-MCNC: 0.4 MG/DL (ref 0.2–1.2)
BUN BLD-MCNC: 16 MG/DL (ref 8–23)
BUN/CREAT SERPL: 17.2 (ref 7–25)
CALCIUM SPEC-SCNC: 9.4 MG/DL (ref 8.6–10.5)
CHLORIDE SERPL-SCNC: 100 MMOL/L (ref 98–107)
CO2 SERPL-SCNC: 25.2 MMOL/L (ref 22–29)
CREAT BLD-MCNC: 0.93 MG/DL (ref 0.76–1.27)
CRP SERPL-MCNC: 0.59 MG/DL (ref 0–0.5)
ERYTHROCYTE [SEDIMENTATION RATE] IN BLOOD: 14 MM/HR (ref 0–20)
GFR SERPL CREATININE-BSD FRML MDRD: 79 ML/MIN/1.73
GLOBULIN UR ELPH-MCNC: 3 GM/DL
GLUCOSE BLD-MCNC: 97 MG/DL (ref 65–99)
POTASSIUM BLD-SCNC: 3.9 MMOL/L (ref 3.5–5.2)
PREALB SERPL-MCNC: 20.4 MG/DL (ref 20–40)
PROT SERPL-MCNC: 7.4 G/DL (ref 6–8.5)
SODIUM BLD-SCNC: 138 MMOL/L (ref 136–145)

## 2019-12-19 PROCEDURE — 73590 X-RAY EXAM OF LOWER LEG: CPT

## 2019-12-19 PROCEDURE — 86140 C-REACTIVE PROTEIN: CPT

## 2019-12-19 PROCEDURE — G0463 HOSPITAL OUTPT CLINIC VISIT: HCPCS

## 2019-12-19 PROCEDURE — 85652 RBC SED RATE AUTOMATED: CPT

## 2019-12-19 PROCEDURE — 80053 COMPREHEN METABOLIC PANEL: CPT

## 2019-12-19 PROCEDURE — 84134 ASSAY OF PREALBUMIN: CPT

## 2019-12-19 PROCEDURE — 36415 COLL VENOUS BLD VENIPUNCTURE: CPT

## 2019-12-27 ENCOUNTER — TRANSCRIBE ORDERS (OUTPATIENT)
Dept: ADMINISTRATIVE | Facility: HOSPITAL | Age: 79
End: 2019-12-27

## 2019-12-27 DIAGNOSIS — L97.812 ULCER OF RIGHT PRETIBIAL REGION, WITH FAT LAYER EXPOSED (HCC): Primary | ICD-10-CM

## 2020-01-02 ENCOUNTER — TRANSCRIBE ORDERS (OUTPATIENT)
Dept: ADMINISTRATIVE | Facility: HOSPITAL | Age: 80
End: 2020-01-02

## 2020-01-02 ENCOUNTER — OFFICE VISIT (OUTPATIENT)
Dept: WOUND CARE | Facility: HOSPITAL | Age: 80
End: 2020-01-02

## 2020-01-02 DIAGNOSIS — Z13.6 ENCOUNTER FOR SCREENING FOR VASCULAR DISEASE: Primary | ICD-10-CM

## 2020-01-02 DIAGNOSIS — L97.812 ULCER OF RIGHT PRETIBIAL REGION, WITH FAT LAYER EXPOSED (HCC): Primary | ICD-10-CM

## 2020-01-03 ENCOUNTER — TRANSCRIBE ORDERS (OUTPATIENT)
Dept: ADMINISTRATIVE | Facility: HOSPITAL | Age: 80
End: 2020-01-03

## 2020-01-03 ENCOUNTER — APPOINTMENT (OUTPATIENT)
Dept: NUCLEAR MEDICINE | Facility: HOSPITAL | Age: 80
End: 2020-01-03

## 2020-01-03 ENCOUNTER — HOSPITAL ENCOUNTER (OUTPATIENT)
Dept: NUCLEAR MEDICINE | Facility: HOSPITAL | Age: 80
Discharge: HOME | End: 2020-01-03

## 2020-01-03 ENCOUNTER — APPOINTMENT (OUTPATIENT)
Dept: WOUND CARE | Facility: HOSPITAL | Age: 80
End: 2020-01-03

## 2020-01-03 DIAGNOSIS — L97.812 ULCER OF RIGHT PRETIBIAL REGION, WITH FAT LAYER EXPOSED (HCC): ICD-10-CM

## 2020-01-03 DIAGNOSIS — I87.311 IDIOPATHIC CHRONIC VENOUS HYPERTENSION OF RIGHT LOWER EXTREMITY WITH ULCER (HCC): Primary | ICD-10-CM

## 2020-01-03 DIAGNOSIS — L97.919 IDIOPATHIC CHRONIC VENOUS HYPERTENSION OF RIGHT LOWER EXTREMITY WITH ULCER (HCC): Primary | ICD-10-CM

## 2020-01-03 PROCEDURE — 0 TECHNETIUM MEDRONATE KIT: Performed by: NURSE PRACTITIONER

## 2020-01-03 PROCEDURE — A9503 TC99M MEDRONATE: HCPCS | Performed by: NURSE PRACTITIONER

## 2020-01-03 PROCEDURE — 78315 BONE IMAGING 3 PHASE: CPT

## 2020-01-03 RX ORDER — TC 99M MEDRONATE 20 MG/10ML
21.5 INJECTION, POWDER, LYOPHILIZED, FOR SOLUTION INTRAVENOUS
Status: COMPLETED | OUTPATIENT
Start: 2020-01-03 | End: 2020-01-03

## 2020-01-03 RX ADMIN — Medication 21.5 MILLICURIE: at 11:26

## 2020-01-07 ENCOUNTER — HOSPITAL ENCOUNTER (OUTPATIENT)
Dept: CARDIOLOGY | Facility: HOSPITAL | Age: 80
Discharge: HOME | End: 2020-01-07
Admitting: NURSE PRACTITIONER

## 2020-01-07 DIAGNOSIS — I87.311 IDIOPATHIC CHRONIC VENOUS HYPERTENSION OF RIGHT LOWER EXTREMITY WITH ULCER (HCC): ICD-10-CM

## 2020-01-07 DIAGNOSIS — L97.919 IDIOPATHIC CHRONIC VENOUS HYPERTENSION OF RIGHT LOWER EXTREMITY WITH ULCER (HCC): ICD-10-CM

## 2020-01-07 LAB
BH CV LOWER VASCULAR LEFT COMMON FEMORAL AUGMENT: NORMAL
BH CV LOWER VASCULAR LEFT COMMON FEMORAL COMPETENT: NORMAL
BH CV LOWER VASCULAR LEFT COMMON FEMORAL COMPRESS: NORMAL
BH CV LOWER VASCULAR LEFT COMMON FEMORAL PHASIC: NORMAL
BH CV LOWER VASCULAR LEFT COMMON FEMORAL SPONT: NORMAL
BH CV LOWER VASCULAR LEFT DISTAL FEMORAL COMPRESS: NORMAL
BH CV LOWER VASCULAR LEFT GASTRONEMIUS COMPRESS: NORMAL
BH CV LOWER VASCULAR LEFT GREATER SAPH AK AUGMENT: NORMAL
BH CV LOWER VASCULAR LEFT GREATER SAPH AK COMPETENT: NORMAL
BH CV LOWER VASCULAR LEFT GREATER SAPH AK COMPRESS: NORMAL
BH CV LOWER VASCULAR LEFT GREATER SAPH AK PHASIC: NORMAL
BH CV LOWER VASCULAR LEFT GREATER SAPH AK SPONT: NORMAL
BH CV LOWER VASCULAR LEFT GREATER SAPH BK AUGMENT: NORMAL
BH CV LOWER VASCULAR LEFT GREATER SAPH BK COMPETENT: NORMAL
BH CV LOWER VASCULAR LEFT GREATER SAPH BK COMPRESS: NORMAL
BH CV LOWER VASCULAR LEFT GREATER SAPH BK PHASIC: NORMAL
BH CV LOWER VASCULAR LEFT GREATER SAPH BK SPONT: NORMAL
BH CV LOWER VASCULAR LEFT LESSER SAPH AUGMENT: NORMAL
BH CV LOWER VASCULAR LEFT LESSER SAPH COMPETENT: NORMAL
BH CV LOWER VASCULAR LEFT LESSER SAPH COMPRESS: NORMAL
BH CV LOWER VASCULAR LEFT LESSER SAPH PHASIC: NORMAL
BH CV LOWER VASCULAR LEFT LESSER SAPH SPONT: NORMAL
BH CV LOWER VASCULAR LEFT MID FEMORAL AUGMENT: NORMAL
BH CV LOWER VASCULAR LEFT MID FEMORAL COMPETENT: NORMAL
BH CV LOWER VASCULAR LEFT MID FEMORAL COMPRESS: NORMAL
BH CV LOWER VASCULAR LEFT MID FEMORAL PHASIC: NORMAL
BH CV LOWER VASCULAR LEFT MID FEMORAL SPONT: NORMAL
BH CV LOWER VASCULAR LEFT PERFORATOR AUGMENT: NORMAL
BH CV LOWER VASCULAR LEFT PERFORATOR COMPETENT: NORMAL
BH CV LOWER VASCULAR LEFT PERONEAL COMPRESS: NORMAL
BH CV LOWER VASCULAR LEFT POPLITEAL AUGMENT: NORMAL
BH CV LOWER VASCULAR LEFT POPLITEAL COMPETENT: NORMAL
BH CV LOWER VASCULAR LEFT POPLITEAL COMPRESS: NORMAL
BH CV LOWER VASCULAR LEFT POPLITEAL PHASIC: NORMAL
BH CV LOWER VASCULAR LEFT POPLITEAL SPONT: NORMAL
BH CV LOWER VASCULAR LEFT POSTERIOR TIBIAL COMPRESS: NORMAL
BH CV LOWER VASCULAR LEFT PROFUNDA FEMORAL COMPRESS: NORMAL
BH CV LOWER VASCULAR LEFT PROXIMAL FEMORAL COMPRESS: NORMAL
BH CV LOWER VASCULAR LEFT SAPHENOFEMORAL JUNCTION AUGMENT: NORMAL
BH CV LOWER VASCULAR LEFT SAPHENOFEMORAL JUNCTION COMPETENT: NORMAL
BH CV LOWER VASCULAR LEFT SAPHENOFEMORAL JUNCTION COMPRESS: NORMAL
BH CV LOWER VASCULAR LEFT SAPHENOFEMORAL JUNCTION PHASIC: NORMAL
BH CV LOWER VASCULAR LEFT SAPHENOFEMORAL JUNCTION SPONT: NORMAL
BH CV LOWER VASCULAR RIGHT COMMON FEMORAL AUGMENT: NORMAL
BH CV LOWER VASCULAR RIGHT COMMON FEMORAL COMPETENT: NORMAL
BH CV LOWER VASCULAR RIGHT COMMON FEMORAL COMPRESS: NORMAL
BH CV LOWER VASCULAR RIGHT COMMON FEMORAL PHASIC: NORMAL
BH CV LOWER VASCULAR RIGHT COMMON FEMORAL SPONT: NORMAL
BH CV LOWER VASCULAR RIGHT DISTAL FEMORAL COMPRESS: NORMAL
BH CV LOWER VASCULAR RIGHT GASTRONEMIUS COMPRESS: NORMAL
BH CV LOWER VASCULAR RIGHT GREATER SAPH AK AUGMENT: NORMAL
BH CV LOWER VASCULAR RIGHT GREATER SAPH AK COMPETENT: NORMAL
BH CV LOWER VASCULAR RIGHT GREATER SAPH AK COMPRESS: NORMAL
BH CV LOWER VASCULAR RIGHT GREATER SAPH AK PHASIC: NORMAL
BH CV LOWER VASCULAR RIGHT GREATER SAPH AK SPONT: NORMAL
BH CV LOWER VASCULAR RIGHT GREATER SAPH BK AUGMENT: NORMAL
BH CV LOWER VASCULAR RIGHT GREATER SAPH BK COMPETENT: NORMAL
BH CV LOWER VASCULAR RIGHT GREATER SAPH BK COMPRESS: NORMAL
BH CV LOWER VASCULAR RIGHT GREATER SAPH BK PHASIC: NORMAL
BH CV LOWER VASCULAR RIGHT GREATER SAPH BK SPONT: NORMAL
BH CV LOWER VASCULAR RIGHT LESSER SAPH AUGMENT: NORMAL
BH CV LOWER VASCULAR RIGHT LESSER SAPH COMPETENT: NORMAL
BH CV LOWER VASCULAR RIGHT LESSER SAPH COMPRESS: NORMAL
BH CV LOWER VASCULAR RIGHT LESSER SAPH PHASIC: NORMAL
BH CV LOWER VASCULAR RIGHT LESSER SAPH SPONT: NORMAL
BH CV LOWER VASCULAR RIGHT MID FEMORAL AUGMENT: NORMAL
BH CV LOWER VASCULAR RIGHT MID FEMORAL COMPETENT: NORMAL
BH CV LOWER VASCULAR RIGHT MID FEMORAL COMPRESS: NORMAL
BH CV LOWER VASCULAR RIGHT MID FEMORAL PHASIC: NORMAL
BH CV LOWER VASCULAR RIGHT MID FEMORAL SPONT: NORMAL
BH CV LOWER VASCULAR RIGHT PERFORATOR 1 COMPETENT: NORMAL
BH CV LOWER VASCULAR RIGHT PERFORATOR AUGMENT: NORMAL
BH CV LOWER VASCULAR RIGHT PERONEAL COMPRESS: NORMAL
BH CV LOWER VASCULAR RIGHT POPLITEAL AUGMENT: NORMAL
BH CV LOWER VASCULAR RIGHT POPLITEAL COMPETENT: NORMAL
BH CV LOWER VASCULAR RIGHT POPLITEAL COMPRESS: NORMAL
BH CV LOWER VASCULAR RIGHT POPLITEAL PHASIC: NORMAL
BH CV LOWER VASCULAR RIGHT POPLITEAL SPONT: NORMAL
BH CV LOWER VASCULAR RIGHT POSTERIOR TIBIAL COMPRESS: NORMAL
BH CV LOWER VASCULAR RIGHT PROFUNDA FEMORAL COMPRESS: NORMAL
BH CV LOWER VASCULAR RIGHT PROXIMAL FEMORAL COMPRESS: NORMAL
BH CV LOWER VASCULAR RIGHT SAPHENOFEMORAL JUNCTION AUGMENT: NORMAL
BH CV LOWER VASCULAR RIGHT SAPHENOFEMORAL JUNCTION COMPETENT: NORMAL
BH CV LOWER VASCULAR RIGHT SAPHENOFEMORAL JUNCTION COMPRESS: NORMAL
BH CV LOWER VASCULAR RIGHT SAPHENOFEMORAL JUNCTION PHASIC: NORMAL
BH CV LOWER VASCULAR RIGHT SAPHENOFEMORAL JUNCTION SPONT: NORMAL
Lab: NORMAL

## 2020-01-07 PROCEDURE — 93970 EXTREMITY STUDY: CPT

## 2020-01-10 ENCOUNTER — OFFICE VISIT (OUTPATIENT)
Dept: WOUND CARE | Facility: HOSPITAL | Age: 80
End: 2020-01-10

## 2020-01-10 PROCEDURE — G0463 HOSPITAL OUTPT CLINIC VISIT: HCPCS

## 2020-01-17 ENCOUNTER — OFFICE VISIT (OUTPATIENT)
Dept: WOUND CARE | Facility: HOSPITAL | Age: 80
End: 2020-01-17

## 2020-01-17 PROCEDURE — G0463 HOSPITAL OUTPT CLINIC VISIT: HCPCS

## 2020-01-21 ENCOUNTER — OFFICE VISIT (OUTPATIENT)
Dept: INTERNAL MEDICINE | Facility: CLINIC | Age: 80
End: 2020-01-21

## 2020-01-21 VITALS
HEART RATE: 80 BPM | OXYGEN SATURATION: 98 % | HEIGHT: 68 IN | WEIGHT: 200 LBS | DIASTOLIC BLOOD PRESSURE: 70 MMHG | BODY MASS INDEX: 30.31 KG/M2 | SYSTOLIC BLOOD PRESSURE: 120 MMHG

## 2020-01-21 DIAGNOSIS — S81.801D WOUND OF RIGHT LOWER EXTREMITY, SUBSEQUENT ENCOUNTER: ICD-10-CM

## 2020-01-21 DIAGNOSIS — E78.01 FAMILIAL HYPERCHOLESTEROLEMIA: ICD-10-CM

## 2020-01-21 DIAGNOSIS — I10 ESSENTIAL HYPERTENSION: Primary | ICD-10-CM

## 2020-01-21 DIAGNOSIS — F41.9 ANXIETY: ICD-10-CM

## 2020-01-21 DIAGNOSIS — E55.9 VITAMIN D DEFICIENCY: ICD-10-CM

## 2020-01-21 DIAGNOSIS — I48.0 PAROXYSMAL ATRIAL FIBRILLATION (HCC): ICD-10-CM

## 2020-01-21 PROCEDURE — 99214 OFFICE O/P EST MOD 30 MIN: CPT | Performed by: INTERNAL MEDICINE

## 2020-01-21 NOTE — PROGRESS NOTES
"Subjective   Gabo Boo is a 79 y.o. male.  Patient presents with essential hypertension as a chief complaint paroxysmal atrial fibrillation, hyperlipidemia, vitamin D deficiency, chronic anxiety, and he has a chronic wound of his right lower extremity that is finally healing well but he has a follow-up appointment tomorrow with the wound center.  The patient's blood pressure is extremely difficult to control and has been extremely labile over the last several years but we went over a regimen of baseline medications to use to control his blood pressure with additional use of Procardia to account for the periods of time that he has blood pressure spikes.      /70   Pulse 80   Ht 171.5 cm (67.52\")   Wt 90.7 kg (200 lb)   SpO2 98%   BMI 30.84 kg/m²     Body mass index is 30.84 kg/m².    History of Present Illness making very slow progress with his leg wound    The following portions of the patient's history were reviewed and updated as appropriate: allergies, current medications, past family history, past medical history, past social history, past surgical history and problem list.    Review of Systems   Constitutional: Positive for fatigue.   HENT: Negative.    Eyes: Negative.    Respiratory: Negative.    Cardiovascular: Negative.    Musculoskeletal: Positive for arthralgias.   Skin: Positive for skin lesions.   Neurological: Negative.        Objective   Physical Exam   Constitutional: He is oriented to person, place, and time. He appears well-developed and well-nourished.   HENT:   Head: Normocephalic and atraumatic.   Cardiovascular: Normal rate, regular rhythm and normal heart sounds.   Pulmonary/Chest: Effort normal and breath sounds normal.   Abdominal: Soft. Bowel sounds are normal.   Musculoskeletal:   Slowly healing wound on the right lower extremity on the lateral aspect   Neurological: He is alert and oriented to person, place, and time.   Skin: Skin is warm.   Psychiatric: He has a normal " mood and affect. His behavior is normal.   Nursing note and vitals reviewed.        Assessment/Plan   Gabo was seen today for hypertension, atrial fibrillation and anxiety.    Diagnoses and all orders for this visit:    Essential hypertension  Comments:  still fluctuating but under better control    Paroxysmal atrial fibrillation (CMS/HCC)  Comments:  well controlled at present    Familial hypercholesterolemia  Comments:  cannot tolerate statins    Vitamin D deficiency  Comments:  continue vitamin D    Anxiety  Comments:  stable for now    Wound of right lower extremity, subsequent encounter  Comments:  wound care center tomorrow!                  yes

## 2020-01-22 ENCOUNTER — TELEPHONE (OUTPATIENT)
Dept: INTERNAL MEDICINE | Facility: CLINIC | Age: 80
End: 2020-01-22

## 2020-01-24 ENCOUNTER — OFFICE VISIT (OUTPATIENT)
Dept: WOUND CARE | Facility: HOSPITAL | Age: 80
End: 2020-01-24

## 2020-01-24 PROCEDURE — G0463 HOSPITAL OUTPT CLINIC VISIT: HCPCS

## 2020-01-27 ENCOUNTER — OFFICE VISIT (OUTPATIENT)
Dept: WOUND CARE | Facility: HOSPITAL | Age: 80
End: 2020-01-27

## 2020-01-27 PROCEDURE — G0463 HOSPITAL OUTPT CLINIC VISIT: HCPCS

## 2020-02-10 RX ORDER — NIFEDIPINE 10 MG/1
CAPSULE ORAL
Qty: 270 CAPSULE | Refills: 1 | Status: SHIPPED | OUTPATIENT
Start: 2020-02-10 | End: 2020-08-24

## 2020-03-05 ENCOUNTER — OFFICE VISIT (OUTPATIENT)
Dept: WOUND CARE | Facility: HOSPITAL | Age: 80
End: 2020-03-05

## 2020-03-05 PROCEDURE — G0463 HOSPITAL OUTPT CLINIC VISIT: HCPCS

## 2020-03-24 ENCOUNTER — APPOINTMENT (OUTPATIENT)
Dept: CARDIOLOGY | Facility: HOSPITAL | Age: 80
End: 2020-03-24

## 2020-03-24 ENCOUNTER — APPOINTMENT (OUTPATIENT)
Dept: CT IMAGING | Facility: HOSPITAL | Age: 80
End: 2020-03-24

## 2020-06-19 ENCOUNTER — OFFICE VISIT (OUTPATIENT)
Dept: INTERNAL MEDICINE | Facility: CLINIC | Age: 80
End: 2020-06-19

## 2020-06-19 VITALS
DIASTOLIC BLOOD PRESSURE: 80 MMHG | HEIGHT: 68 IN | SYSTOLIC BLOOD PRESSURE: 142 MMHG | OXYGEN SATURATION: 99 % | HEART RATE: 67 BPM | BODY MASS INDEX: 29.55 KG/M2 | WEIGHT: 195 LBS

## 2020-06-19 DIAGNOSIS — K42.9 UMBILICAL HERNIA WITHOUT OBSTRUCTION AND WITHOUT GANGRENE: Primary | ICD-10-CM

## 2020-06-19 PROCEDURE — 99213 OFFICE O/P EST LOW 20 MIN: CPT | Performed by: NURSE PRACTITIONER

## 2020-06-22 NOTE — PROGRESS NOTES
Subjective   Gabo Boo is a 79 y.o. male who presents due to umbilical pain.    He presents due to a bulge in the umbilical area which he noticed several months ago.  He worked outside and did excessive lifting approximately 2 weeks ago and noticed increased size and tenderness of bulge.  He has had intermittent constipation as well which is gradually improving.  He has rested over the past few days and does report improvement in the umbilical pain.    Abdominal Pain   Pertinent negatives include no arthralgias, dysuria, fever, frequency, headaches, nausea or vomiting.        The following portions of the patient's history were reviewed and updated as appropriate: allergies, current medications, past social history and problem list.    Past Medical History:   Diagnosis Date   • Abnormal barium swallow 07/08/2016    HH, reflux   • Anxiety    • GERD (gastroesophageal reflux disease)    • H/O cataract     Dr Heron Hoffman   • Hiatal hernia    • Hyperlipidemia    • Hypertension    • Internal hemorrhoids    • Seizures (CMS/HCC)     July 2013 was last seizure   • Tubular adenoma of colon          Current Outpatient Medications:   •  aspirin 81 MG chewable tablet, Chew 81 mg Daily., Disp: , Rfl:   •  clonazePAM (KlonoPIN) 0.5 MG tablet, TAKE 1 TABLET BY MOUTH TWICE A DAY AS NEEDED, Disp: 60 tablet, Rfl: 2  •  clotrimazole-betamethasone (LOTRISONE) 1-0.05 % cream, Apply  topically to the right forefoot once a day, Disp: 45 g, Rfl: 1  •  collagenase 250 UNIT/GM ointment, Apply  topically to the right leg wound (nickel thick layer) twice daily, Disp: 90 g, Rfl: 3  •  diclofenac (VOLTAREN) 1 % gel gel, APPLY 4 G TOPICALLY TO THE APPROPRIATE AREA AS DIRECTED 4 (FOUR) TIMES A DAY., Disp: 100 g, Rfl: 0  •  hydrALAZINE (APRESOLINE) 100 MG tablet, Take 100 mg by mouth 4 (Four) Times a Day., Disp: , Rfl: 11  •  lacosamide (VIMPAT) 200 MG tablet, Take 200 mg by mouth 2 (Two) Times a Day., Disp: , Rfl:   •  mirtazapine  "(REMERON) 15 MG tablet, Take 15 mg by mouth Every Night., Disp: , Rfl:   •  NIFEdipine (PROCARDIA) 10 MG capsule, TAKE 1 CAPSULE BY MOUTH THREE TIMES A DAY, Disp: 270 capsule, Rfl: 1  •  QUEtiapine fumarate ER (SEROquel XR) 50 MG tablet sustained-release 24 hour tablet, 50 mg Every Night., Disp: , Rfl:     No Known Allergies    Review of Systems   Constitutional: Negative for chills, fatigue, fever and unexpected weight change.   HENT: Negative for congestion, ear pain, postnasal drip, sinus pressure, sore throat and trouble swallowing.    Eyes: Negative for visual disturbance.   Respiratory: Negative for cough, chest tightness and wheezing.    Cardiovascular: Negative for chest pain, palpitations and leg swelling.   Gastrointestinal: Positive for abdominal pain. Negative for blood in stool, nausea and vomiting.   Genitourinary: Negative for dysuria, frequency and urgency.   Musculoskeletal: Negative for arthralgias and joint swelling.   Skin: Negative for color change.   Neurological: Negative for syncope, weakness and headaches.   Hematological: Does not bruise/bleed easily.       Objective   Vitals:    06/19/20 1446   BP: 142/80   BP Location: Left arm   Patient Position: Sitting   Cuff Size: Adult   Pulse: 67   SpO2: 99%   Weight: 88.5 kg (195 lb)   Height: 171.5 cm (67.5\")     Body mass index is 30.09 kg/m².  Physical Exam   Constitutional: He appears well-developed and well-nourished. He is cooperative. He does not have a sickly appearance. He does not appear ill.   HENT:   Head: Normocephalic.   Right Ear: Hearing, tympanic membrane and external ear normal.   Left Ear: Hearing, tympanic membrane and external ear normal.   Nose: Nose normal. No mucosal edema, rhinorrhea, sinus tenderness or nasal deformity. Right sinus exhibits no maxillary sinus tenderness and no frontal sinus tenderness. Left sinus exhibits no maxillary sinus tenderness and no frontal sinus tenderness.   Mouth/Throat: Oropharynx is clear and " moist and mucous membranes are normal. Normal dentition.   Eyes: Conjunctivae and lids are normal. Right eye exhibits no discharge and no exudate. Left eye exhibits no discharge and no exudate.   Neck: Trachea normal. Carotid bruit is not present. No edema present. No thyroid mass present.   Cardiovascular: Regular rhythm, normal heart sounds and normal pulses.   No murmur heard.  Pulmonary/Chest: Breath sounds normal. No respiratory distress. He has no decreased breath sounds. He has no wheezes. He has no rhonchi. He has no rales.   Abdominal: Soft. Bowel sounds are normal. A hernia is present.   Umbilical hernia present with mild tenderness   Lymphadenopathy:        Head (right side): No submental, no submandibular, no tonsillar, no preauricular, no posterior auricular and no occipital adenopathy present.        Head (left side): No submental, no submandibular, no tonsillar, no preauricular, no posterior auricular and no occipital adenopathy present.   Neurological: He is alert.   Skin: Skin is warm, dry and intact. No cyanosis. Nails show no clubbing.       Assessment/Plan   Gabo was seen today for abdominal pain.    Diagnoses and all orders for this visit:    Umbilical hernia without obstruction and without gangrene  -     Ambulatory Referral to General Surgery    He reports significant pain at site of umbilical hernia over the past 2 weeks which is gradually improved with resting (pain increased after lifting).  He requests a surgical consult to discuss possible surgery.

## 2020-06-26 ENCOUNTER — OFFICE VISIT (OUTPATIENT)
Dept: INTERNAL MEDICINE | Facility: CLINIC | Age: 80
End: 2020-06-26

## 2020-06-26 DIAGNOSIS — F41.9 ANXIETY: ICD-10-CM

## 2020-06-26 DIAGNOSIS — K21.9 GASTROESOPHAGEAL REFLUX DISEASE WITHOUT ESOPHAGITIS: ICD-10-CM

## 2020-06-26 DIAGNOSIS — E55.9 VITAMIN D DEFICIENCY: ICD-10-CM

## 2020-06-26 DIAGNOSIS — E78.01 FAMILIAL HYPERCHOLESTEROLEMIA: Chronic | ICD-10-CM

## 2020-06-26 DIAGNOSIS — I10 ESSENTIAL HYPERTENSION: Primary | Chronic | ICD-10-CM

## 2020-06-26 DIAGNOSIS — F39 MOOD DISORDER (HCC): ICD-10-CM

## 2020-06-26 PROCEDURE — 99443 PR PHYS/QHP TELEPHONE EVALUATION 21-30 MIN: CPT | Performed by: INTERNAL MEDICINE

## 2020-06-26 NOTE — PROGRESS NOTES
Subjective   Gabo Boo is a 79 y.o. male. You have chosen to receive care through a telephone visit. Do you consent to use a telephone visit for your medical care today? Yes patient presents by telephone for about a 20-minute interview regarding a chief complaint of essential hypertension that is under much better control, hyperlipidemia, vitamin D deficiency, gastroesophageal reflux disease without esophagitis, mood disorder that is much better controlled along with his anxiety here for follow-up evaluation and treatment.  His main concern is about an upcoming appointment he has with a general surgeon Dr. Power regarding his umbilical hernia and the timing of the repair.  As long as the hernia remained stable he has no pain or incarceration I told him that he was free to wait until a more opportune moment to have it repaired however if he was having episodes of pain or incarceration and needed to be repaired much earlier.  Fortunately Dr. Power is 1 of our best surgeons and I know that he will give Mr. Boo excellent advice and care regarding this issue.        There were no vitals taken for this visit.    There is no height or weight on file to calculate BMI.    History of Present Illness ongoing concerned about an umbilical hernia and the timing of repair    The following portions of the patient's history were reviewed and updated as appropriate: allergies, current medications, past family history, past medical history, past social history, past surgical history and problem list.    Review of Systems   Constitutional: Negative.    HENT: Negative.    Respiratory: Negative.    Cardiovascular: Negative.    Gastrointestinal: Negative.    Musculoskeletal: Negative.    Neurological: Negative.    Psychiatric/Behavioral: Negative.        Objective   Physical Exam   Constitutional: He is oriented to person, place, and time. He appears well-developed and well-nourished.   Cardiovascular:   No cardiovascular  complaints at this time   Pulmonary/Chest:   No pulmonary complaints at this time   Abdominal:   Stable umbilical hernia awaiting surgical consultation with regards to best course of action for repair   Musculoskeletal:   Mild arthropathy but otherwise doing well   Neurological: He is alert and oriented to person, place, and time.   Psychiatric: He has a normal mood and affect. His behavior is normal.   Nursing note reviewed.        Assessment/Plan   Diagnoses and all orders for this visit:    Essential hypertension  Comments:  well controlled    Familial hypercholesterolemia  Comments:  no change in therapy needed    Vitamin D deficiency  Comments:  continue vitamin D daily    Gastroesophageal reflux disease without esophagitis  Comments:  well controlled    Mood disorder (CMS/[facility])  Comments:  well controlled at present    Anxiety  Comments:  doing well currently

## 2020-06-29 ENCOUNTER — OFFICE VISIT (OUTPATIENT)
Dept: SURGERY | Facility: CLINIC | Age: 80
End: 2020-06-29

## 2020-06-29 VITALS — HEIGHT: 70 IN | BODY MASS INDEX: 27.77 KG/M2 | WEIGHT: 194 LBS

## 2020-06-29 DIAGNOSIS — Z86.010 HX OF ADENOMATOUS POLYP OF COLON: ICD-10-CM

## 2020-06-29 DIAGNOSIS — K43.9 HERNIA, EPIGASTRIC: ICD-10-CM

## 2020-06-29 DIAGNOSIS — K42.9 UMBILICAL HERNIA WITHOUT OBSTRUCTION AND WITHOUT GANGRENE: Primary | ICD-10-CM

## 2020-06-29 PROCEDURE — 99204 OFFICE O/P NEW MOD 45 MIN: CPT | Performed by: SURGERY

## 2020-06-29 RX ORDER — MULTIVITAMIN WITH IRON
100 TABLET ORAL DAILY
COMMUNITY

## 2020-06-29 RX ORDER — VITAMIN B COMPLEX
1 CAPSULE ORAL DAILY
COMMUNITY

## 2020-06-29 RX ORDER — CEFAZOLIN SODIUM 2 G/100ML
2 INJECTION, SOLUTION INTRAVENOUS ONCE
Status: CANCELLED | OUTPATIENT
Start: 2020-11-03 | End: 2020-06-29

## 2020-06-30 NOTE — PROGRESS NOTES
SUMMARY (A/P):    79-year-old gentleman with ventral hernia consisting of umbilical and epigastric components, both of which are reducible and mildly symptomatic.  Additionally, he is due surveillance colonoscopy due to personal history of adenomatous polyps.  He wishes to proceed with open ventral hernia repair and colonoscopy.  He understands the rationale for the procedure, the nature of the procedure, and the risks including but not limited to bleeding, infection, use of mesh, and recurrence.  He will call to schedule at his convenience.  He understands that his multiple medical comorbidities increased risks associated with surgical intervention and anesthesia.      CC:    Hernia    HPI:    79-year-old gentleman presents with mild periumbilical abdominal pain associated with visible and palpable bulge over the last 2 weeks.  Symptoms are worse with activity.    PSH:    • Colonoscopy 2013-tubular adenoma  • EGD 2016-basically normal    PMH:    • Hypertension  • Hyperlipidemia  • Atrial fibrillation  • Adenomatous polyps  • Seizure disorder-last seizure was approximately 8 years ago per his report    FAMILY HISTORY:    • Negative for colorectal cancer    SOCIAL HISTORY:   • Denies tobacco use  • Occasional alcohol use    ALLERGIES:   • None    MEDICATIONS:   • Reviewed, in epic    ROS:    Influenza Like Illness: no fever, no  cough, no  sore throat, no  body aches, no loss of sense of taste or smell, no known exposure to person with Covid-19.  All other systems reviewed and negative other than presenting complaints.    PHYSICAL EXAM:   • Constitutional: Well-developed well-nourished, no acute distress  • Vital signs: Weight 194 pounds, height 70 inches, BMI 28  • Eyes: Conjunctiva normal, sclera nonicteric  • ENMT: Hearing grossly normal, oral mucosa moist  • Neck: Supple, no palpable mass, trachea midline  • Respiratory: Clear to auscultation, normal inspiratory effort  • Cardiovascular: Regular rate, no murmur,  no carotid bruit, no peripheral edema, no jugular venous distention  • Gastrointestinal: Soft, nontender, no palpable mass, no hepatosplenomegaly, small to moderate epigastric hernia just superior and left of umbilicus, reducible.  Small reducible umbilical hernia.  • Lymphatics (palpable nodes):  cervical-negative, axillary-negative  • Skin:  Warm, dry, no rash on visualized skin surfaces  • Musculoskeletal: Symmetric strength, normal gait  • Psychiatric: Alert and oriented ×3, normal affect     FARHAD PAZ M.D.

## 2020-07-01 ENCOUNTER — TELEPHONE (OUTPATIENT)
Dept: GASTROENTEROLOGY | Facility: CLINIC | Age: 80
End: 2020-07-01

## 2020-07-01 NOTE — TELEPHONE ENCOUNTER
----- Message from Lisbet Lewis Rep sent at 7/1/2020 10:47 AM EDT -----  Regarding: Questions   Contact: [phone]  Patient states he has an Umbilical hernia and he saw Dr. Power. States when he has this repaired Dr. Power stated he could do his colonoscopy as well. Wants to know if this makes sense to Froylan?

## 2020-07-23 ENCOUNTER — TELEPHONE (OUTPATIENT)
Dept: SURGERY | Facility: CLINIC | Age: 80
End: 2020-07-23

## 2020-08-24 RX ORDER — NIFEDIPINE 10 MG/1
CAPSULE ORAL
Qty: 270 CAPSULE | Refills: 1 | Status: SHIPPED | OUTPATIENT
Start: 2020-08-24 | End: 2020-12-21

## 2020-09-25 ENCOUNTER — OFFICE VISIT (OUTPATIENT)
Dept: INTERNAL MEDICINE | Facility: CLINIC | Age: 80
End: 2020-09-25

## 2020-09-25 VITALS — BODY MASS INDEX: 29.94 KG/M2 | HEIGHT: 68 IN

## 2020-09-25 DIAGNOSIS — R53.83 FATIGUE, UNSPECIFIED TYPE: ICD-10-CM

## 2020-09-25 DIAGNOSIS — E78.01 FAMILIAL HYPERCHOLESTEROLEMIA: ICD-10-CM

## 2020-09-25 DIAGNOSIS — I48.0 PAROXYSMAL ATRIAL FIBRILLATION (HCC): Primary | ICD-10-CM

## 2020-09-25 DIAGNOSIS — F41.9 ANXIETY: ICD-10-CM

## 2020-09-25 DIAGNOSIS — I10 ESSENTIAL HYPERTENSION: ICD-10-CM

## 2020-09-25 DIAGNOSIS — E55.9 VITAMIN D DEFICIENCY: ICD-10-CM

## 2020-09-25 PROCEDURE — 99214 OFFICE O/P EST MOD 30 MIN: CPT | Performed by: INTERNAL MEDICINE

## 2020-09-25 NOTE — PROGRESS NOTES
"Subjective   Gabo Boo is a 80 y.o. male. Presents by video with a chief complaint of hypertension, chronic anxiety, umbilical hernia that is stable, paroxysmal atrial fibrillation, hyperlipidemia, and vitamin D deficiency with some issues with chronic fatigue that he is working through through gentle exercise and better nutrition.  Patient is doing well overall and is planning elective umbilical hernia repair sometime this coming winter.      Ht 171.5 cm (67.5\")   BMI 29.94 kg/m²     Body mass index is 29.94 kg/m².    History of Present Illness doing better overall    The following portions of the patient's history were reviewed and updated as appropriate: allergies, current medications, past family history, past medical history, past social history, past surgical history and problem list.    Review of Systems   Constitutional: Positive for fatigue.   HENT: Negative.    Respiratory: Negative.    Cardiovascular: Negative.    Gastrointestinal: Negative.    Musculoskeletal: Negative.    Neurological: Negative.    Psychiatric/Behavioral: Negative.        Objective   Physical Exam  Vitals signs and nursing note reviewed.   Constitutional:       Appearance: Normal appearance.   Cardiovascular:      Comments: No cardiac complaints  Pulmonary:      Comments: No pulmonary complaints  Abdominal:      Comments: Reports an umbilical hernia   Neurological:      General: No focal deficit present.      Mental Status: He is alert and oriented to person, place, and time.   Psychiatric:         Mood and Affect: Mood normal.         Behavior: Behavior normal.           Assessment/Plan   Gabo was seen today for medicare wellness-subsequent.    Diagnoses and all orders for this visit:    Paroxysmal atrial fibrillation (CMS/[facility])  Comments:  well controlled    Essential hypertension  Comments:  well controlled at present    Familial hypercholesterolemia  Comments:  no change in therapy    Vitamin D " deficiency  Comments:  vitamin d daily    Fatigue, unspecified type  Comments:  rest and mild exercise    Anxiety  Comments:  doing better

## 2020-10-14 PROBLEM — K42.9 UMBILICAL HERNIA WITHOUT OBSTRUCTION AND WITHOUT GANGRENE: Status: ACTIVE | Noted: 2020-10-14

## 2020-10-14 PROBLEM — Z86.010 HX OF ADENOMATOUS POLYP OF COLON: Status: ACTIVE | Noted: 2020-10-14

## 2020-10-14 PROBLEM — Z86.0101 HX OF ADENOMATOUS POLYP OF COLON: Status: ACTIVE | Noted: 2020-10-14

## 2020-10-14 PROBLEM — K43.9 HERNIA, EPIGASTRIC: Status: ACTIVE | Noted: 2020-10-14

## 2020-10-21 ENCOUNTER — TRANSCRIBE ORDERS (OUTPATIENT)
Dept: PREADMISSION TESTING | Facility: HOSPITAL | Age: 80
End: 2020-10-21

## 2020-10-21 DIAGNOSIS — Z01.818 OTHER SPECIFIED PRE-OPERATIVE EXAMINATION: Primary | ICD-10-CM

## 2020-10-27 ENCOUNTER — APPOINTMENT (OUTPATIENT)
Dept: PREADMISSION TESTING | Facility: HOSPITAL | Age: 80
End: 2020-10-27

## 2020-10-27 VITALS
TEMPERATURE: 96.3 F | SYSTOLIC BLOOD PRESSURE: 112 MMHG | WEIGHT: 190.4 LBS | RESPIRATION RATE: 16 BRPM | DIASTOLIC BLOOD PRESSURE: 67 MMHG | BODY MASS INDEX: 27.26 KG/M2 | OXYGEN SATURATION: 98 % | HEIGHT: 70 IN | HEART RATE: 80 BPM

## 2020-10-27 LAB
ANION GAP SERPL CALCULATED.3IONS-SCNC: 8.2 MMOL/L (ref 5–15)
BUN SERPL-MCNC: 14 MG/DL (ref 8–23)
BUN/CREAT SERPL: 14.1 (ref 7–25)
CALCIUM SPEC-SCNC: 9 MG/DL (ref 8.6–10.5)
CHLORIDE SERPL-SCNC: 106 MMOL/L (ref 98–107)
CO2 SERPL-SCNC: 24.8 MMOL/L (ref 22–29)
CREAT SERPL-MCNC: 0.99 MG/DL (ref 0.76–1.27)
DEPRECATED RDW RBC AUTO: 49.1 FL (ref 37–54)
ERYTHROCYTE [DISTWIDTH] IN BLOOD BY AUTOMATED COUNT: 14 % (ref 12.3–15.4)
GFR SERPL CREATININE-BSD FRML MDRD: 73 ML/MIN/1.73
GLUCOSE SERPL-MCNC: 113 MG/DL (ref 65–99)
HCT VFR BLD AUTO: 31.7 % (ref 37.5–51)
HGB BLD-MCNC: 10.8 G/DL (ref 13–17.7)
MCH RBC QN AUTO: 32.8 PG (ref 26.6–33)
MCHC RBC AUTO-ENTMCNC: 34.1 G/DL (ref 31.5–35.7)
MCV RBC AUTO: 96.4 FL (ref 79–97)
PLATELET # BLD AUTO: 236 10*3/MM3 (ref 140–450)
PMV BLD AUTO: 9.6 FL (ref 6–12)
POTASSIUM SERPL-SCNC: 4.1 MMOL/L (ref 3.5–5.2)
QT INTERVAL: 424 MS
RBC # BLD AUTO: 3.29 10*6/MM3 (ref 4.14–5.8)
SODIUM SERPL-SCNC: 139 MMOL/L (ref 136–145)
WBC # BLD AUTO: 3.12 10*3/MM3 (ref 3.4–10.8)

## 2020-10-27 PROCEDURE — 85027 COMPLETE CBC AUTOMATED: CPT | Performed by: SURGERY

## 2020-10-27 PROCEDURE — 93005 ELECTROCARDIOGRAM TRACING: CPT

## 2020-10-27 PROCEDURE — 36415 COLL VENOUS BLD VENIPUNCTURE: CPT

## 2020-10-27 PROCEDURE — 80048 BASIC METABOLIC PNL TOTAL CA: CPT | Performed by: SURGERY

## 2020-10-27 PROCEDURE — 93010 ELECTROCARDIOGRAM REPORT: CPT | Performed by: INTERNAL MEDICINE

## 2020-10-27 RX ORDER — CHOLECALCIFEROL (VITD3)/VIT K2 25-90 MCG
1 TABLET,DISINTEGRATING ORAL DAILY
COMMUNITY

## 2020-10-27 RX ORDER — PERPHENAZINE/AMITRIPTYLINE HCL 2 MG-10 MG
4 TABLET ORAL DAILY
COMMUNITY

## 2020-10-27 RX ORDER — LACOSAMIDE 200 MG/1
200 TABLET ORAL EVERY 12 HOURS SCHEDULED
Status: ON HOLD | COMMUNITY
End: 2020-11-03 | Stop reason: SDUPTHER

## 2020-10-31 ENCOUNTER — APPOINTMENT (OUTPATIENT)
Dept: LAB | Facility: HOSPITAL | Age: 80
End: 2020-10-31

## 2020-10-31 ENCOUNTER — LAB (OUTPATIENT)
Dept: LAB | Facility: HOSPITAL | Age: 80
End: 2020-10-31

## 2020-10-31 DIAGNOSIS — Z01.818 OTHER SPECIFIED PRE-OPERATIVE EXAMINATION: ICD-10-CM

## 2020-10-31 PROCEDURE — U0004 COV-19 TEST NON-CDC HGH THRU: HCPCS | Performed by: INTERNAL MEDICINE

## 2020-10-31 PROCEDURE — C9803 HOPD COVID-19 SPEC COLLECT: HCPCS

## 2020-11-02 LAB — SARS-COV-2 RNA RESP QL NAA+PROBE: NOT DETECTED

## 2020-11-03 ENCOUNTER — HOSPITAL ENCOUNTER (OUTPATIENT)
Facility: HOSPITAL | Age: 80
Setting detail: HOSPITAL OUTPATIENT SURGERY
Discharge: HOME | End: 2020-11-03
Attending: SURGERY | Admitting: SURGERY

## 2020-11-03 ENCOUNTER — ANESTHESIA (OUTPATIENT)
Dept: PERIOP | Facility: HOSPITAL | Age: 80
End: 2020-11-03

## 2020-11-03 ENCOUNTER — ANESTHESIA EVENT (OUTPATIENT)
Dept: PERIOP | Facility: HOSPITAL | Age: 80
End: 2020-11-03

## 2020-11-03 VITALS
HEART RATE: 50 BPM | TEMPERATURE: 97.7 F | BODY MASS INDEX: 26.73 KG/M2 | SYSTOLIC BLOOD PRESSURE: 146 MMHG | OXYGEN SATURATION: 96 % | DIASTOLIC BLOOD PRESSURE: 91 MMHG | HEIGHT: 70 IN | RESPIRATION RATE: 16 BRPM | WEIGHT: 186.7 LBS

## 2020-11-03 DIAGNOSIS — K43.9 HERNIA, EPIGASTRIC: ICD-10-CM

## 2020-11-03 DIAGNOSIS — K42.9 UMBILICAL HERNIA WITHOUT OBSTRUCTION AND WITHOUT GANGRENE: ICD-10-CM

## 2020-11-03 DIAGNOSIS — Z86.010 HX OF ADENOMATOUS POLYP OF COLON: ICD-10-CM

## 2020-11-03 PROCEDURE — 25010000002 PROPOFOL 10 MG/ML EMULSION: Performed by: NURSE ANESTHETIST, CERTIFIED REGISTERED

## 2020-11-03 PROCEDURE — 25010000002 FENTANYL CITRATE (PF) 100 MCG/2ML SOLUTION: Performed by: NURSE ANESTHETIST, CERTIFIED REGISTERED

## 2020-11-03 PROCEDURE — 25010000002 ONDANSETRON PER 1 MG: Performed by: NURSE ANESTHETIST, CERTIFIED REGISTERED

## 2020-11-03 PROCEDURE — 88305 TISSUE EXAM BY PATHOLOGIST: CPT | Performed by: SURGERY

## 2020-11-03 PROCEDURE — 49560 PR REPAIR INCISIONAL HERNIA,REDUCIBLE: CPT | Performed by: PHYSICIAN ASSISTANT

## 2020-11-03 PROCEDURE — 25010000002 PHENYLEPHRINE PER 1 ML: Performed by: NURSE ANESTHETIST, CERTIFIED REGISTERED

## 2020-11-03 PROCEDURE — 25010000003 CEFAZOLIN IN DEXTROSE 2-4 GM/100ML-% SOLUTION: Performed by: SURGERY

## 2020-11-03 PROCEDURE — S0260 H&P FOR SURGERY: HCPCS | Performed by: SURGERY

## 2020-11-03 PROCEDURE — 49568 PR IMPLANT MESH HERNIA REPAIR/DEBRIDEMENT CLOSURE: CPT | Performed by: SURGERY

## 2020-11-03 PROCEDURE — 49568 PR IMPLANT MESH HERNIA REPAIR/DEBRIDEMENT CLOSURE: CPT | Performed by: PHYSICIAN ASSISTANT

## 2020-11-03 PROCEDURE — 45380 COLONOSCOPY AND BIOPSY: CPT | Performed by: SURGERY

## 2020-11-03 PROCEDURE — 49560 PR REPAIR INCISIONAL HERNIA,REDUCIBLE: CPT | Performed by: SURGERY

## 2020-11-03 PROCEDURE — C1781 MESH (IMPLANTABLE): HCPCS | Performed by: SURGERY

## 2020-11-03 DEVICE — VENTRALEX ST HERNIA PATCH
Type: IMPLANTABLE DEVICE | Site: ABDOMEN | Status: FUNCTIONAL
Brand: VENTRALEX ST HERNIA PATCH

## 2020-11-03 DEVICE — CLIP LIGAT VASC HORIZON TI LG ORNG 6CT: Type: IMPLANTABLE DEVICE | Site: ABDOMEN | Status: FUNCTIONAL

## 2020-11-03 RX ORDER — PROMETHAZINE HYDROCHLORIDE 25 MG/1
25 SUPPOSITORY RECTAL ONCE AS NEEDED
Status: DISCONTINUED | OUTPATIENT
Start: 2020-11-03 | End: 2020-11-03 | Stop reason: HOSPADM

## 2020-11-03 RX ORDER — OXYCODONE AND ACETAMINOPHEN 7.5; 325 MG/1; MG/1
1 TABLET ORAL ONCE AS NEEDED
Status: DISCONTINUED | OUTPATIENT
Start: 2020-11-03 | End: 2020-11-03 | Stop reason: HOSPADM

## 2020-11-03 RX ORDER — FLUMAZENIL 0.1 MG/ML
0.2 INJECTION INTRAVENOUS AS NEEDED
Status: DISCONTINUED | OUTPATIENT
Start: 2020-11-03 | End: 2020-11-03 | Stop reason: HOSPADM

## 2020-11-03 RX ORDER — HYDROCODONE BITARTRATE AND ACETAMINOPHEN 5; 325 MG/1; MG/1
TABLET ORAL
Qty: 24 TABLET | Refills: 0 | Status: SHIPPED | OUTPATIENT
Start: 2020-11-03 | End: 2021-03-11

## 2020-11-03 RX ORDER — CEFAZOLIN SODIUM 2 G/100ML
2 INJECTION, SOLUTION INTRAVENOUS ONCE
Status: COMPLETED | OUTPATIENT
Start: 2020-11-03 | End: 2020-11-03

## 2020-11-03 RX ORDER — DIPHENHYDRAMINE HYDROCHLORIDE 50 MG/ML
12.5 INJECTION INTRAMUSCULAR; INTRAVENOUS
Status: DISCONTINUED | OUTPATIENT
Start: 2020-11-03 | End: 2020-11-03 | Stop reason: HOSPADM

## 2020-11-03 RX ORDER — ONDANSETRON 4 MG/1
4 TABLET, FILM COATED ORAL EVERY 6 HOURS PRN
Qty: 10 TABLET | Refills: 1 | Status: SHIPPED | OUTPATIENT
Start: 2020-11-03 | End: 2021-03-11

## 2020-11-03 RX ORDER — NALOXONE HCL 0.4 MG/ML
0.2 VIAL (ML) INJECTION AS NEEDED
Status: DISCONTINUED | OUTPATIENT
Start: 2020-11-03 | End: 2020-11-03 | Stop reason: HOSPADM

## 2020-11-03 RX ORDER — ACETAMINOPHEN 325 MG/1
325 TABLET ORAL EVERY 6 HOURS PRN
COMMUNITY
End: 2021-03-11

## 2020-11-03 RX ORDER — FENTANYL CITRATE 50 UG/ML
INJECTION, SOLUTION INTRAMUSCULAR; INTRAVENOUS AS NEEDED
Status: DISCONTINUED | OUTPATIENT
Start: 2020-11-03 | End: 2020-11-03 | Stop reason: SURG

## 2020-11-03 RX ORDER — ONDANSETRON 2 MG/ML
4 INJECTION INTRAMUSCULAR; INTRAVENOUS ONCE AS NEEDED
Status: DISCONTINUED | OUTPATIENT
Start: 2020-11-03 | End: 2020-11-03 | Stop reason: HOSPADM

## 2020-11-03 RX ORDER — HYDROCODONE BITARTRATE AND ACETAMINOPHEN 7.5; 325 MG/1; MG/1
1 TABLET ORAL ONCE AS NEEDED
Status: COMPLETED | OUTPATIENT
Start: 2020-11-03 | End: 2020-11-03

## 2020-11-03 RX ORDER — SODIUM CHLORIDE 0.9 % (FLUSH) 0.9 %
3 SYRINGE (ML) INJECTION EVERY 12 HOURS SCHEDULED
Status: DISCONTINUED | OUTPATIENT
Start: 2020-11-03 | End: 2020-11-03 | Stop reason: HOSPADM

## 2020-11-03 RX ORDER — HYDROMORPHONE HYDROCHLORIDE 1 MG/ML
0.5 INJECTION, SOLUTION INTRAMUSCULAR; INTRAVENOUS; SUBCUTANEOUS
Status: DISCONTINUED | OUTPATIENT
Start: 2020-11-03 | End: 2020-11-03 | Stop reason: HOSPADM

## 2020-11-03 RX ORDER — ONDANSETRON 2 MG/ML
INJECTION INTRAMUSCULAR; INTRAVENOUS AS NEEDED
Status: DISCONTINUED | OUTPATIENT
Start: 2020-11-03 | End: 2020-11-03 | Stop reason: SURG

## 2020-11-03 RX ORDER — SODIUM CHLORIDE 0.9 % (FLUSH) 0.9 %
3-10 SYRINGE (ML) INJECTION AS NEEDED
Status: DISCONTINUED | OUTPATIENT
Start: 2020-11-03 | End: 2020-11-03 | Stop reason: HOSPADM

## 2020-11-03 RX ORDER — FENTANYL CITRATE 50 UG/ML
50 INJECTION, SOLUTION INTRAMUSCULAR; INTRAVENOUS
Status: DISCONTINUED | OUTPATIENT
Start: 2020-11-03 | End: 2020-11-03 | Stop reason: HOSPADM

## 2020-11-03 RX ORDER — PROPOFOL 10 MG/ML
VIAL (ML) INTRAVENOUS CONTINUOUS PRN
Status: DISCONTINUED | OUTPATIENT
Start: 2020-11-03 | End: 2020-11-03 | Stop reason: SURG

## 2020-11-03 RX ORDER — FAMOTIDINE 10 MG/ML
20 INJECTION, SOLUTION INTRAVENOUS ONCE
Status: COMPLETED | OUTPATIENT
Start: 2020-11-03 | End: 2020-11-03

## 2020-11-03 RX ORDER — PROMETHAZINE HYDROCHLORIDE 25 MG/1
25 TABLET ORAL ONCE AS NEEDED
Status: DISCONTINUED | OUTPATIENT
Start: 2020-11-03 | End: 2020-11-03 | Stop reason: HOSPADM

## 2020-11-03 RX ORDER — DIPHENHYDRAMINE HCL 25 MG
25 CAPSULE ORAL
Status: DISCONTINUED | OUTPATIENT
Start: 2020-11-03 | End: 2020-11-03 | Stop reason: HOSPADM

## 2020-11-03 RX ORDER — MAGNESIUM HYDROXIDE 1200 MG/15ML
LIQUID ORAL AS NEEDED
Status: DISCONTINUED | OUTPATIENT
Start: 2020-11-03 | End: 2020-11-03 | Stop reason: HOSPADM

## 2020-11-03 RX ORDER — LIDOCAINE HYDROCHLORIDE 20 MG/ML
INJECTION, SOLUTION INFILTRATION; PERINEURAL AS NEEDED
Status: DISCONTINUED | OUTPATIENT
Start: 2020-11-03 | End: 2020-11-03 | Stop reason: SURG

## 2020-11-03 RX ORDER — EPHEDRINE SULFATE 50 MG/ML
5 INJECTION, SOLUTION INTRAVENOUS ONCE AS NEEDED
Status: DISCONTINUED | OUTPATIENT
Start: 2020-11-03 | End: 2020-11-03 | Stop reason: HOSPADM

## 2020-11-03 RX ORDER — LABETALOL HYDROCHLORIDE 5 MG/ML
5 INJECTION, SOLUTION INTRAVENOUS
Status: DISCONTINUED | OUTPATIENT
Start: 2020-11-03 | End: 2020-11-03 | Stop reason: HOSPADM

## 2020-11-03 RX ORDER — SODIUM CHLORIDE, SODIUM LACTATE, POTASSIUM CHLORIDE, CALCIUM CHLORIDE 600; 310; 30; 20 MG/100ML; MG/100ML; MG/100ML; MG/100ML
9 INJECTION, SOLUTION INTRAVENOUS CONTINUOUS
Status: DISCONTINUED | OUTPATIENT
Start: 2020-11-03 | End: 2020-11-03 | Stop reason: HOSPADM

## 2020-11-03 RX ORDER — PROPOFOL 10 MG/ML
VIAL (ML) INTRAVENOUS AS NEEDED
Status: DISCONTINUED | OUTPATIENT
Start: 2020-11-03 | End: 2020-11-03 | Stop reason: SURG

## 2020-11-03 RX ORDER — BUPIVACAINE HYDROCHLORIDE AND EPINEPHRINE 5; 5 MG/ML; UG/ML
INJECTION, SOLUTION PERINEURAL AS NEEDED
Status: DISCONTINUED | OUTPATIENT
Start: 2020-11-03 | End: 2020-11-03 | Stop reason: HOSPADM

## 2020-11-03 RX ORDER — LIDOCAINE HYDROCHLORIDE 10 MG/ML
0.5 INJECTION, SOLUTION EPIDURAL; INFILTRATION; INTRACAUDAL; PERINEURAL ONCE AS NEEDED
Status: DISCONTINUED | OUTPATIENT
Start: 2020-11-03 | End: 2020-11-03 | Stop reason: HOSPADM

## 2020-11-03 RX ADMIN — HYDROCODONE BITARTRATE AND ACETAMINOPHEN 1 TABLET: 7.5; 325 TABLET ORAL at 11:40

## 2020-11-03 RX ADMIN — FENTANYL CITRATE 50 MCG: 50 INJECTION, SOLUTION INTRAMUSCULAR; INTRAVENOUS at 11:56

## 2020-11-03 RX ADMIN — ONDANSETRON HYDROCHLORIDE 4 MG: 2 SOLUTION INTRAMUSCULAR; INTRAVENOUS at 10:26

## 2020-11-03 RX ADMIN — PHENYLEPHRINE HYDROCHLORIDE 100 MCG: 10 INJECTION INTRAVENOUS at 10:56

## 2020-11-03 RX ADMIN — LIDOCAINE HYDROCHLORIDE 100 MG: 20 INJECTION, SOLUTION INFILTRATION; PERINEURAL at 10:16

## 2020-11-03 RX ADMIN — FENTANYL CITRATE 50 MCG: 50 INJECTION INTRAMUSCULAR; INTRAVENOUS at 10:30

## 2020-11-03 RX ADMIN — PROPOFOL 80 MG: 10 INJECTION, EMULSION INTRAVENOUS at 10:16

## 2020-11-03 RX ADMIN — FENTANYL CITRATE 50 MCG: 50 INJECTION INTRAMUSCULAR; INTRAVENOUS at 10:36

## 2020-11-03 RX ADMIN — PROPOFOL 100 MCG/KG/MIN: 10 INJECTION, EMULSION INTRAVENOUS at 10:16

## 2020-11-03 RX ADMIN — FENTANYL CITRATE 50 MCG: 50 INJECTION, SOLUTION INTRAMUSCULAR; INTRAVENOUS at 11:40

## 2020-11-03 RX ADMIN — FENTANYL CITRATE 50 MCG: 50 INJECTION, SOLUTION INTRAMUSCULAR; INTRAVENOUS at 12:22

## 2020-11-03 RX ADMIN — FAMOTIDINE 20 MG: 10 INJECTION INTRAVENOUS at 09:30

## 2020-11-03 RX ADMIN — SODIUM CHLORIDE, POTASSIUM CHLORIDE, SODIUM LACTATE AND CALCIUM CHLORIDE 9 ML/HR: 600; 310; 30; 20 INJECTION, SOLUTION INTRAVENOUS at 08:55

## 2020-11-03 RX ADMIN — CEFAZOLIN SODIUM 2 G: 2 INJECTION, SOLUTION INTRAVENOUS at 10:11

## 2020-11-03 NOTE — H&P
HPI: 79-year-old gentleman with ventral hernia consisting of umbilical and epigastric components.  He also has personal history of adenomatous polyps with last colonoscopy in 2013.  Presents today for open ventral hernia repair and colonoscopy.    PMH, PSH, MEDS AND ALLERGIES reviewed and reconciled with EPIC    PHYSICAL EXAM:  -  Constitutional:  no acute distress  -  Respiratory:  normal inspiratory effort  -  Cardiovascular: regular rate  -  Gastrointestinal: Soft, small umbilical hernia, small to moderate epigastric hernia just superior and left of the umbilicus that remains reducible    ASSESSMENT/PLAN:    Ventral hernia repair and colonoscopy.  I clarified with him the location of the 2 hernias and the type of incision and repair.  I also explained to him that he has a rectus diastases which is not a hernia and will look the same postoperatively as it does now.    Gabo Power M.D.

## 2020-11-03 NOTE — DISCHARGE INSTRUCTIONS
Dr. Gabo Power  [address]  [place], [place] [address] [phone]    Discharge Instructions for Hernia Surgery    1. Go home, rest and take it easy today; however, you should get up and move about several times today to reduce the risk of developing a clot in your legs.      2. You may experience some dizziness or memory loss from the anesthesia.  This may last for the next 24 hours.  Someone should plan on staying with you for the first 24 hours for your safety.    3. Do not make any important legal decisions or sign any legal papers for the next 24 hours.      4. Eat and drink lightly today.  Start off with liquids, jello, soup, crackers or other bland foods at first. You may advance your diet tomorrow as tolerated as long as you do not experience any nausea or vomiting.     5. If skin glue (Dermabond) was used, your incisions are protected and covered.  The invisible glue will dissolve on its own as your incision heals. If dressings were used, you may remove your outer dressings in 3 days.  The white tapes called steri-strips should stay in place.  They will fall off on their own in 1-2 weeks.  Do not worry if they come off sooner.      6. If dressings were used, you may notice some bleeding/drainage on your outer dressings. A little bloody drainage is normal. If the bleeding/drainage is such that the bandage cannot absorb it, remove the dressing, apply clean gauze and apply firm pressure for a full 15 minutes.  If the bleeding continues, please call me.    7. You may shower tomorrow allowing water to run over the incisions; however, do not scrub the incisions.  No tub baths until your incisions are completely healed.      8. No lifting > 20 lbs. until you are seen at your follow-up visit.         9. You have received a prescription for a narcotic pain medicine, as you will have some pain following surgery.   You will not be totally pain free, but your pain medicine should make the pain  tolerable.  Please take your pain medicine as prescribed and always take your pills with food to prevent nausea. If you are having severe pain that cannot be controlled by the pain medicine, please contact me.      10. You have also received a prescription for an anti-nausea medicine.  Please take this as prescribed for any nausea or vomiting.  Nausea could be a result of the anesthesia or a result of the narcotic pain medicine.  If you experience severe nausea and vomiting that cannot be controlled by the nausea medicine, please call me.      11. If you had a laparoscopic surgery, it is not unusual to experience pain/discomfort in your shoulders or under your ribs after surgery.  It is from the gas used during the laparoscopic procedure and usually lasts 1-3 days.  The prescription pain medicine is used to treat the surgical pain and does not typically alleviate this “gassy” pain.     12. No driving for 24 hours and for as long as you are taking your prescription pain medicine.    13. You will need to call the office at [phone] to schedule a follow-up appointment in 6-10 days.     14. Remember to contact me for any of the following:    • Fever > 101 degrees  • Severe pain that cannot be controlled by taking your pain pills  • Severe nausea or vomiting that cannot be controlled by taking your nausea pills  • Significant bleeding of your incisions  • Drainage that has a bad smell or is yellow or green in appearance  • Any other questions or concerns      Additional Instruction for Inguinal Hernia Patients Only    1. If you did not urinate at the hospital after your surgery or if you feel the need to urinate and cannot, this will necessitate a return to the Emergency Room for placement of a urinary catheter.  You should also notify me as well.  As a rule, you should be able to empty your bladder within 4-6 hours after discharge from the hospital.      2. You may notice some scrotal bruising and/or swelling. A scrotal  support or briefs as well as ice packs may be used to alleviate discomfort.

## 2020-11-03 NOTE — ANESTHESIA PREPROCEDURE EVALUATION
Anesthesia Evaluation     history of anesthetic complications: PONV               Airway   Mallampati: II  TM distance: >3 FB  Neck ROM: full  No difficulty expected  Dental - normal exam     Pulmonary - normal exam   Cardiovascular - normal exam    (+) hypertension, dysrhythmias, hyperlipidemia,       Neuro/Psych  (+) seizures, psychiatric history Anxiety,     GI/Hepatic/Renal/Endo    (+)  hiatal hernia, GERD,      Musculoskeletal     Abdominal    Substance History      OB/GYN          Other                        Anesthesia Plan    ASA 2     MAC     intravenous induction     Anesthetic plan, all risks, benefits, and alternatives have been provided, discussed and informed consent has been obtained with: patient.

## 2020-11-03 NOTE — ANESTHESIA POSTPROCEDURE EVALUATION
Patient: Gabo Boo    Procedure Summary     Date: 11/03/20 Room / Location: [facility]  / [facility]    Anesthesia Start: 1011 Anesthesia Stop: 1128    Procedures:       VENTRAL HERNIA REPAIR WITH MESH (N/A Abdomen)      COLONOSCOPY (N/A ) Diagnosis:       Umbilical hernia without obstruction and without gangrene      Hernia, epigastric      Hx of adenomatous polyp of colon      (Umbilical hernia without obstruction and without gangrene [K42.9])      (Hernia, epigastric [K43.9])      (Hx of adenomatous polyp of colon [Z86.010])    Surgeon: Gabo Power MD Provider: Riley Darden MD    Anesthesia Type: MAC ASA Status: 2          Anesthesia Type: MAC    Vitals  Vitals Value Taken Time   /107 11/03/20 1200   Temp 36.5 °C (97.7 °F) 11/03/20 1125   Pulse 55 11/03/20 1145   Resp 16 11/03/20 1145   SpO2 98 % 11/03/20 1202   Vitals shown include unvalidated device data.        Post Anesthesia Care and Evaluation    Patient location during evaluation: PACU  Patient participation: complete - patient participated  Level of consciousness: awake and alert  Pain management: adequate  Airway patency: patent  Anesthetic complications: No anesthetic complications    Cardiovascular status: acceptable  Respiratory status: acceptable  Hydration status: acceptable    Comments: --------------------            11/03/20               1145     --------------------   BP:       152/97     Pulse:      55       Resp:       16       Temp:                SpO2:      97%      --------------------

## 2020-11-03 NOTE — PERIOPERATIVE NURSING NOTE
Dr. Power at bedside, discussing prep with pt, notified pt unable to remove bracelet, no new orders noted.

## 2020-11-03 NOTE — OP NOTE
PREOPERATIVE DIAGNOSIS:  1.  Ventral hernia (combined epigastric/umbilical)  2.  History of adenomatous polyps    POSTOPERATIVE DIAGNOSIS (FINDINGS):  1.  Ventral hernia  2.  2 small transverse colon polyps  3.  Moderate sigmoid diverticulosis    PROCEDURE:  1.  Open ventral hernia repair with mesh  2.  Colonoscopy to cecum with cold biopsy removal of polyps    SURGEON:  Gabo Power MD    ASSISTANT:  Gee Howard    ANESTHESIA:  Monitored sedation    EBL:  Minimal    SPECIMEN(S):  Polyps    DESCRIPTION:  In supine position under sedation prepped and draped usual sterile manner.  Half percent Marcaine with epinephrine infiltrated locally and small curvilinear incision made just above into the left of the umbilicus.  A moderately large hernia sac was identified and circumferentially dissected down to the level of the fascia.  This was the epigastric hernia with a fascial defect measuring 1.5 cm.  The small umbilical hernia sac was then dissected free from the overlying umbilical skin and reduced.  The 2 defects were 1 cm apart.  I developed the preperitoneal plane and then placed a small Ventralex ST mesh in the preperitoneal plane.  The 2 defects were closed over the mesh with interrupted 0 Ethibond sutures incorporating bites of the mesh in the closure.  Good hemostasis was noted.  The umbilicus was tacked to the fascia with 3-0 Vicryl.  The skin was closed with 3-0 Vicryl deep dermal and 5-0 Vicryl subcuticular followed by Dermabond.    Turned in lateral decubitus position.  Digital rectal exam normal.  Colonoscope introduced to the level of the cecum confirmed by visualization of ileocecal valve and base of cecum.  Scope was slowly withdrawn circumferentially examining all mucosal surfaces.  Bowel preparation was excellent.  2 small polyps in the transverse colon were identified and removed with cold biopsy forceps.  Good hemostasis at each site.  No other mucosal abnormality noted.  Moderate sigmoid  diverticulosis was noted.  Tolerated well, stable to recovery room.    Gabo Power M.D.

## 2020-11-04 LAB
LAB AP CASE REPORT: NORMAL
PATH REPORT.FINAL DX SPEC: NORMAL
PATH REPORT.GROSS SPEC: NORMAL

## 2020-11-05 ENCOUNTER — TELEPHONE (OUTPATIENT)
Dept: SURGERY | Facility: CLINIC | Age: 80
End: 2020-11-05

## 2020-11-05 NOTE — TELEPHONE ENCOUNTER
Patient called with c/o redness around his incision s/p open ventral hernia repair with mesh 11/3/20 he states the area is warm and sensitive to touch. He denies drainage and has not had a fever. The area does not itch and there is no visible swelling.

## 2020-11-05 NOTE — TELEPHONE ENCOUNTER
It is probably just a reaction to the local anesthetic.  However, would probably be best to start him on Keflex 500 mg p.o. 3 times daily, #21, no refills, just to be safe.

## 2020-11-12 ENCOUNTER — OFFICE VISIT (OUTPATIENT)
Dept: SURGERY | Facility: CLINIC | Age: 80
End: 2020-11-12

## 2020-11-12 DIAGNOSIS — Z09 FOLLOW UP: Primary | ICD-10-CM

## 2020-11-12 PROCEDURE — 99024 POSTOP FOLLOW-UP VISIT: CPT | Performed by: SURGERY

## 2020-11-12 RX ORDER — CEPHALEXIN 500 MG/1
500 CAPSULE ORAL 3 TIMES DAILY
COMMUNITY
Start: 2020-11-05 | End: 2021-03-11

## 2020-11-13 NOTE — PROGRESS NOTES
Postoperative visit    11/3/2020  1.  Open ventral hernia repair with mesh  2.  Colonoscopy to cecum with cold biopsy removal of polyps    Pathology: Tubular adenomas    Office visit: Incision has healed well.  Activity restrictions discussed.  5-year surveillance colonoscopy recommended.

## 2020-12-21 RX ORDER — NIFEDIPINE 10 MG/1
10 CAPSULE ORAL 3 TIMES DAILY PRN
Qty: 90 CAPSULE | Refills: 3 | Status: SHIPPED | OUTPATIENT
Start: 2020-12-21 | End: 2021-05-28

## 2021-03-11 ENCOUNTER — OFFICE VISIT (OUTPATIENT)
Dept: INTERNAL MEDICINE | Facility: CLINIC | Age: 81
End: 2021-03-11

## 2021-03-11 VITALS
HEIGHT: 70 IN | HEART RATE: 73 BPM | DIASTOLIC BLOOD PRESSURE: 68 MMHG | TEMPERATURE: 97.7 F | SYSTOLIC BLOOD PRESSURE: 120 MMHG | WEIGHT: 197 LBS | OXYGEN SATURATION: 97 % | BODY MASS INDEX: 28.2 KG/M2

## 2021-03-11 DIAGNOSIS — I48.0 PAROXYSMAL ATRIAL FIBRILLATION (HCC): ICD-10-CM

## 2021-03-11 DIAGNOSIS — I10 ESSENTIAL HYPERTENSION: ICD-10-CM

## 2021-03-11 DIAGNOSIS — E78.5 HYPERLIPIDEMIA, UNSPECIFIED HYPERLIPIDEMIA TYPE: ICD-10-CM

## 2021-03-11 DIAGNOSIS — R56.9 SEIZURE (HCC): ICD-10-CM

## 2021-03-11 DIAGNOSIS — Z12.5 SCREENING FOR MALIGNANT NEOPLASM OF PROSTATE: ICD-10-CM

## 2021-03-11 DIAGNOSIS — G47.00 INSOMNIA, UNSPECIFIED TYPE: ICD-10-CM

## 2021-03-11 DIAGNOSIS — E55.9 VITAMIN D DEFICIENCY: ICD-10-CM

## 2021-03-11 DIAGNOSIS — Z95.818 PRESENCE OF WATCHMAN LEFT ATRIAL APPENDAGE CLOSURE DEVICE: ICD-10-CM

## 2021-03-11 DIAGNOSIS — I71.21 ASCENDING AORTIC ANEURYSM (HCC): ICD-10-CM

## 2021-03-11 DIAGNOSIS — R73.03 PREDIABETES: ICD-10-CM

## 2021-03-11 DIAGNOSIS — B07.0 PLANTAR WART, RIGHT FOOT: Primary | ICD-10-CM

## 2021-03-11 PROBLEM — K43.9 HERNIA, EPIGASTRIC: Status: RESOLVED | Noted: 2020-10-14 | Resolved: 2021-03-11

## 2021-03-11 PROBLEM — L03.90 WOUND CELLULITIS: Status: RESOLVED | Noted: 2019-12-18 | Resolved: 2021-03-11

## 2021-03-11 PROBLEM — F39 MOOD DISORDER (HCC): Status: RESOLVED | Noted: 2017-07-27 | Resolved: 2021-03-11

## 2021-03-11 PROBLEM — K42.9 UMBILICAL HERNIA WITHOUT OBSTRUCTION AND WITHOUT GANGRENE: Status: RESOLVED | Noted: 2020-10-14 | Resolved: 2021-03-11

## 2021-03-11 PROBLEM — Z00.00 MEDICARE ANNUAL WELLNESS VISIT, INITIAL: Status: RESOLVED | Noted: 2019-03-04 | Resolved: 2021-03-11

## 2021-03-11 PROBLEM — Z09 HOSPITAL DISCHARGE FOLLOW-UP: Status: RESOLVED | Noted: 2018-01-23 | Resolved: 2021-03-11

## 2021-03-11 PROCEDURE — 99214 OFFICE O/P EST MOD 30 MIN: CPT | Performed by: FAMILY MEDICINE

## 2021-03-11 NOTE — PROGRESS NOTES
Venipuncture performed in LAC by KD with good hemostasis. Patient tolerated well. 3/11/2021 Anna GUDINO LPN.

## 2021-03-11 NOTE — PROGRESS NOTES
Date of Encounter: 2021  Patient: Gabo Boo,  1940    Subjective   History of Presenting Illness  Chief complaint: Right foot pain    Right foot pain for several months when he walks on hard surfaces.  He went to get fitted for new shoes and they said he needed to see a podiatrist.  He has a podiatry appointment tomorrow.  He has a growth on his foot in the area of pain.    Paroxysmal atrial fibrillation: Previously on warfarin and Eliquis, underwent watchman procedure and transition to aspirin only due to concern for bleeding from secondary to seizures.  He has not had any evidence of atrial fibrillation on recent Holter monitor.  He is not currently taking aspirin despite recommendations.  Also has an ascending aortic aneurysm.  He does follow with cardiology although he missed his appointment last year.    Seizures: Followed by neurology, generally partial seizures with recent good control.  He reports last seizure 8 years ago although it seems that he has had some partial seizures that he was unaware of since then.    Hypertension currently controlled.    Hyperlipidemia not on statin.    Insomnia managed with mirtazapine and quetiapine.    Lives with wife.  3 children.  Never smoker.  1 glass of red wine per day.  Exercises 5-7 times per week by walking.  Works as a consulting .  Has a living will.  Has not gotten his COVID-19 vaccination.  Last colon cancer screening  with 5-year follow-up.    Review of Systems:  Negative for fever, congestion, chest pain upon exertion, shortness of breath, vision changes, vomiting, dysuria, lymphadenopathy, muscle weakness, numbness, mood changes, rashes.    The following portions of the patient's history were reviewed and updated as appropriate: allergies, current medications, past family history, past medical history, past social history, past surgical history and problem list.    Patient Active Problem List   Diagnosis   • Anxiety   •  Hyperlipidemia   • Hypertension   • Insomnia   • Prediabetes   • Fatigue   • Focal epilepsy (CMS/HCC)   • Hygroma   • Seizure (CMS/HCC)   • Partial seizures (CMS/HCC)   • Vitamin D deficiency   • Bilateral lower extremity edema   • H/O lead exposure   • Paroxysmal atrial fibrillation (CMS/HCC)   • Hx of adenomatous polyp of colon   • Presence of Watchman left atrial appendage closure device   • Ascending aortic aneurysm (CMS/HCC)     Past Medical History:   Diagnosis Date   • Abnormal barium swallow 07/08/2016    HH, reflux   • AF (paroxysmal atrial fibrillation) (CMS/HCC)    • Anxiety    • GERD (gastroesophageal reflux disease)    • H/O cataract     Dr Heron Hoffman   • Hernia, epigastric 10/14/2020    Added automatically from request for surgery [id number]   • Hiatal hernia    • Hyperlipidemia    • Hypertension    • Internal hemorrhoids    • Mood disorder (CMS/HCC) 7/27/2017   • PONV (postoperative nausea and vomiting)    • Seizures (CMS/HCC)     July 2013 was last seizure   • Tubular adenoma of colon    • Umbilical hernia without obstruction and without gangrene 10/14/2020    Added automatically from request for surgery [id number]     Past Surgical History:   Procedure Laterality Date   • CATARACT EXTRACTION Bilateral    • COLONOSCOPY  04/23/2013    Dr Galeano/[place], , tubular adenoma w/low grade dysplasia   • COLONOSCOPY N/A 11/3/2020    Procedure: COLONOSCOPY;  Surgeon: Gabo Power MD;  Location: [facility] OR;  Service: General;  Laterality: N/A;   • ENDOSCOPY N/A 12/16/2016    Procedure: ESOPHAGOGASTRODUODENOSCOPY WITH COLD BIOPSIES AND 54 FR KENNY DILATATION;  Surgeon: Shiraz Galeano MD;  Location: [facility] ENDOSCOPY;  Service:    • INGUINAL HERNIA REPAIR      as a child and again in 1970 approx.   • KRISTYN Left 03/2019    Left Atrial Appendage Occlusion W KRISTYN   • TONSILLECTOMY     • VENTRAL/INCISIONAL HERNIA REPAIR N/A 11/3/2020    Procedure: VENTRAL HERNIA REPAIR WITH MESH;  Surgeon: Gabo Power MD;   Location: [facility] MAIN OR;  Service: General;  Laterality: N/A;   • WRIST SURGERY Right      Family History   Problem Relation Age of Onset   • Stroke Father    • Malig Hyperthermia Neg Hx        Current Outpatient Medications:   •  Astaxanthin 4 MG capsule, Take 4 mg by mouth Daily., Disp: , Rfl:   •  B Complex Vitamins (VITAMIN B COMPLEX) capsule capsule, Take 1 capsule by mouth Daily., Disp: , Rfl:   •  Bioflavonoid Products (C COMPLEX PO), Take 1,000 mg by mouth Daily., Disp: , Rfl:   •  BLACK CURRANT SEED OIL PO, Take 1 tablet by mouth Daily., Disp: , Rfl:   •  CBD (cannabidiol) oral oil, Take 1 drop by mouth Daily., Disp: , Rfl:   •  cholecalciferol (VITAMIN D3) 250 MCG (10347 UT) capsule, Take 10,000 Units by mouth Daily., Disp: , Rfl:   •  clonazePAM (KlonoPIN) 0.5 MG tablet, TAKE 1 TABLET BY MOUTH TWICE A DAY AS NEEDED (Patient taking differently: Take 0.5 mg by mouth 2 (Two) Times a Day As Needed for Anxiety.), Disp: 60 tablet, Rfl: 2  •  hydrALAZINE (APRESOLINE) 100 MG tablet, Take 50 mg by mouth 2 (Two) Times a Day As Needed., Disp: , Rfl: 11  •  lacosamide (Vimpat) 200 MG tablet, Take 200 mg by mouth 2 (Two) Times a Day., Disp: , Rfl:   •  mirtazapine (REMERON) 15 MG tablet, Take 15 mg by mouth Every Night., Disp: , Rfl:   •  Misc Natural Products (Cortisol Manager) tablet, Take 2 tablets by mouth As Needed., Disp: , Rfl:   •  Multiple Vitamin (MULTIVITAMIN PO), Take 1 tablet by mouth Daily., Disp: , Rfl:   •  NIFEdipine (PROCARDIA) 10 MG capsule, Take 1 capsule by mouth 3 (Three) Times a Day As Needed for High Blood Pressure., Disp: 90 capsule, Rfl: 3  •  Nutritional Supplements (PYCNOGENOL PO), Take 100 mg by mouth Every Morning., Disp: , Rfl:   •  Omega-3 Fatty Acids (OMEGA-3 FISH OIL PO), Take 1 capsule by mouth Daily., Disp: , Rfl:   •  QUEtiapine fumarate ER (SEROquel XR) 50 MG tablet sustained-release 24 hour tablet, Take 50 mg by mouth At Night As Needed., Disp: , Rfl:   •  Trace Min  "CaCrCuFeKMgMnPSeZn (MINERALS PO), Take 1 tablet by mouth Daily., Disp: , Rfl:   •  TURMERIC PO, Take 200 mg by mouth Daily., Disp: , Rfl:   •  Ubiquinol 100 MG capsule, Take 100 mg by mouth Daily., Disp: , Rfl:   •  Unable to find, Take 1 each by mouth Daily. Liposomal C 1000 mg,, Disp: , Rfl:   •  Unable to find, Take 1 each by mouth Daily. Ageless Brain by [employer], HOLD ALL SUPPLEMENTS FOR SURGERY, Disp: , Rfl:   •  vitamin B-6 (PYRIDOXINE) 100 MG tablet, Take 100 mg by mouth Daily. HOLD ALL SUPPLEMENTS FOR SURGERY, Disp: , Rfl:   •  Vitamin D-Vitamin K (D3 + K2 Dots) 1000-90 UNIT-MCG tablet, Take 1 tablet/day by mouth Daily. HOLD ALL SUPPLEMENTS FOR SURGERY, Disp: , Rfl:   •  Salicylic Acid 17.6 % liquid, Apply topically once daily to wart, Disp: 9.3 mL, Rfl: 3  No Known Allergies  Social History     Tobacco Use   • Smoking status: Never Smoker   • Smokeless tobacco: Never Used   Vaping Use   • Vaping Use: Never used   Substance Use Topics   • Alcohol use: Yes     Alcohol/week: 2.0 standard drinks     Types: 1 Cans of beer, 1 Shots of liquor per week     Comment: daily   • Drug use: No          Objective   Physical Exam  Vitals:    03/11/21 0956   BP: 120/68   Pulse: 73   Temp: 97.7 °F (36.5 °C)   TempSrc: Temporal   SpO2: 97%   Weight: 89.4 kg (197 lb)   Height: 177.8 cm (70\")     Body mass index is 28.27 kg/m².    Constitutional: NAD.  Eyes: EOMI. PERRLA. Normal conjunctiva.  Cardiovascular: RRR. No murmurs. No LE edema b/l. Radial pulses 2+ bilaterally.  Pulmonary: CTA b/l. Good effort.  Integumentary: No rashes or wounds on face or upper extremities.  1 cm keratinaceous protrusion from plantar surface of right foot.  After obtaining consent and discussing the risks and benefits, I used a #10 blade to gently debride the area revealing a plantar wart.  There were no complications from this minor procedure.  Lymphatic: No anterior cervical lymphadenopathy.  Endocrine: No thyromegaly or palpable thyroid " nodules.  Psychiatric: Normal affect. Normal thought content.     Assessment/Plan   Assessment and Plan  Pleasant 72-year-old male with seizure disorder, paroxysmal atrial fibrillation with watchman device, ascending aortic aneurysm, hypertension, hyperlipidemia, prediabetes, vitamin D deficiency, insomnia, who presents for the following:    Diagnoses and all orders for this visit:    1. Plantar wart, right foot (Primary): Recommend topical therapy, okay to follow-up with podiatry  -     Salicylic Acid 17.6 % liquid; Apply topically once daily to wart  Dispense: 9.3 mL; Refill: 3    2. Paroxysmal atrial fibrillation (CMS/HCC): Recommend patient continue to follow with cardiology.  I do recommend he restarts aspirin.  3. Presence of Watchman left atrial appendage closure device  4. Ascending aortic aneurysm (CMS/HCC)    5. Seizure (CMS/HCC): Continue to follow with neurology    6. Essential hypertension: Controlled  -     CBC & Differential    7. Hyperlipidemia, unspecified hyperlipidemia type  -     Lipid Panel  -     Comprehensive Metabolic Panel    8. Prediabetes  -     Thyroid Panel With TSH  -     Hemoglobin A1c    9. Vitamin D deficiency  -     Vitamin D 25 Hydroxy    10. Insomnia, unspecified type: Stable    11. Screening for malignant neoplasm of prostate  -     PSA Screen    Follow-up in 3 months for Medicare wellness visit    Tomas Lopez MD  Family Medicine  O: [phone]  C: [phone]    Disclaimer: Parts of this note were dictated by speech recognition. Minor errors in transcription may be present. Please call if questions.

## 2021-03-20 LAB
25(OH)D3+25(OH)D2 SERPL-MCNC: 98.6 NG/ML (ref 30–100)
ALBUMIN SERPL-MCNC: 4.5 G/DL (ref 3.7–4.7)
ALBUMIN/GLOB SERPL: 1.9 {RATIO} (ref 1.2–2.2)
ALP SERPL-CCNC: 84 IU/L (ref 39–117)
ALT SERPL-CCNC: 20 IU/L (ref 0–44)
AST SERPL-CCNC: 26 IU/L (ref 0–40)
BASOPHILS # BLD AUTO: 0.1 X10E3/UL (ref 0–0.2)
BASOPHILS NFR BLD AUTO: 1 %
BILIRUB SERPL-MCNC: 0.7 MG/DL (ref 0–1.2)
BUN SERPL-MCNC: 13 MG/DL (ref 8–27)
BUN/CREAT SERPL: 14 (ref 10–24)
CALCIUM SERPL-MCNC: 9.5 MG/DL (ref 8.6–10.2)
CHLORIDE SERPL-SCNC: 98 MMOL/L (ref 96–106)
CHOLEST SERPL-MCNC: 157 MG/DL (ref 100–199)
CO2 SERPL-SCNC: 24 MMOL/L (ref 20–29)
CREAT SERPL-MCNC: 0.96 MG/DL (ref 0.76–1.27)
EOSINOPHIL # BLD AUTO: 0.1 X10E3/UL (ref 0–0.4)
EOSINOPHIL NFR BLD AUTO: 3 %
ERYTHROCYTE [DISTWIDTH] IN BLOOD BY AUTOMATED COUNT: 12.3 % (ref 11.6–15.4)
FT4I SERPL CALC-MCNC: 1.7 (ref 1.2–4.9)
GLOBULIN SER CALC-MCNC: 2.4 G/DL (ref 1.5–4.5)
GLUCOSE SERPL-MCNC: 133 MG/DL (ref 65–99)
HBA1C MFR BLD: 5.4 % (ref 4.8–5.6)
HCT VFR BLD AUTO: 34.1 % (ref 37.5–51)
HDLC SERPL-MCNC: 51 MG/DL
HGB BLD-MCNC: 12 G/DL (ref 13–17.7)
IMM GRANULOCYTES # BLD AUTO: 0 X10E3/UL (ref 0–0.1)
IMM GRANULOCYTES NFR BLD AUTO: 1 %
LDLC SERPL CALC-MCNC: 91 MG/DL (ref 0–99)
LYMPHOCYTES # BLD AUTO: 0.8 X10E3/UL (ref 0.7–3.1)
LYMPHOCYTES NFR BLD AUTO: 19 %
MCH RBC QN AUTO: 33.9 PG (ref 26.6–33)
MCHC RBC AUTO-ENTMCNC: 35.2 G/DL (ref 31.5–35.7)
MCV RBC AUTO: 96 FL (ref 79–97)
MONOCYTES # BLD AUTO: 0.4 X10E3/UL (ref 0.1–0.9)
MONOCYTES NFR BLD AUTO: 9 %
NEUTROPHILS # BLD AUTO: 3 X10E3/UL (ref 1.4–7)
NEUTROPHILS NFR BLD AUTO: 67 %
PLATELET # BLD AUTO: 247 X10E3/UL (ref 150–450)
POTASSIUM SERPL-SCNC: 4.4 MMOL/L (ref 3.5–5.2)
PROT SERPL-MCNC: 6.9 G/DL (ref 6–8.5)
PSA SERPL-MCNC: 0.4 NG/ML (ref 0–4)
RBC # BLD AUTO: 3.54 X10E6/UL (ref 4.14–5.8)
SODIUM SERPL-SCNC: 135 MMOL/L (ref 134–144)
T3RU NFR SERPL: 26 % (ref 24–39)
T4 SERPL-MCNC: 6.7 UG/DL (ref 4.5–12)
TRIGL SERPL-MCNC: 79 MG/DL (ref 0–149)
TSH SERPL DL<=0.005 MIU/L-ACNC: 1.29 UIU/ML (ref 0.45–4.5)
VLDLC SERPL CALC-MCNC: 15 MG/DL (ref 5–40)
WBC # BLD AUTO: 4.4 X10E3/UL (ref 3.4–10.8)

## 2021-03-21 PROBLEM — D64.9 ANEMIA: Status: ACTIVE | Noted: 2021-03-21

## 2021-05-28 ENCOUNTER — OFFICE VISIT (OUTPATIENT)
Dept: INTERNAL MEDICINE | Facility: CLINIC | Age: 81
End: 2021-05-28

## 2021-05-28 VITALS
OXYGEN SATURATION: 97 % | HEIGHT: 70 IN | BODY MASS INDEX: 28.29 KG/M2 | WEIGHT: 197.6 LBS | SYSTOLIC BLOOD PRESSURE: 138 MMHG | DIASTOLIC BLOOD PRESSURE: 88 MMHG | HEART RATE: 56 BPM

## 2021-05-28 DIAGNOSIS — Z00.00 ENCOUNTER FOR SUBSEQUENT ANNUAL WELLNESS VISIT (AWV) IN MEDICARE PATIENT: Primary | ICD-10-CM

## 2021-05-28 DIAGNOSIS — R56.9 SEIZURE (HCC): ICD-10-CM

## 2021-05-28 DIAGNOSIS — I71.21 ASCENDING AORTIC ANEURYSM (HCC): ICD-10-CM

## 2021-05-28 DIAGNOSIS — Z95.818 PRESENCE OF WATCHMAN LEFT ATRIAL APPENDAGE CLOSURE DEVICE: ICD-10-CM

## 2021-05-28 DIAGNOSIS — E55.9 VITAMIN D DEFICIENCY: ICD-10-CM

## 2021-05-28 DIAGNOSIS — R60.0 BILATERAL LOWER EXTREMITY EDEMA: ICD-10-CM

## 2021-05-28 DIAGNOSIS — I10 ESSENTIAL HYPERTENSION: ICD-10-CM

## 2021-05-28 DIAGNOSIS — G47.00 INSOMNIA, UNSPECIFIED TYPE: ICD-10-CM

## 2021-05-28 DIAGNOSIS — E78.01 FAMILIAL HYPERCHOLESTEROLEMIA: ICD-10-CM

## 2021-05-28 DIAGNOSIS — I48.0 PAROXYSMAL ATRIAL FIBRILLATION (HCC): ICD-10-CM

## 2021-05-28 DIAGNOSIS — D64.9 ANEMIA, UNSPECIFIED TYPE: ICD-10-CM

## 2021-05-28 PROCEDURE — G0439 PPPS, SUBSEQ VISIT: HCPCS | Performed by: FAMILY MEDICINE

## 2021-05-28 RX ORDER — OLMESARTAN MEDOXOMIL 20 MG/1
20 TABLET ORAL DAILY
COMMUNITY
Start: 2021-05-14 | End: 2021-06-13

## 2021-05-28 RX ORDER — MULTIVIT-MIN/IRON/FOLIC ACID/K 18-600-40
2000 CAPSULE ORAL
COMMUNITY
End: 2022-03-18

## 2021-05-28 RX ORDER — CARVEDILOL 6.25 MG/1
6.25 TABLET ORAL
COMMUNITY
Start: 2021-05-14 | End: 2021-06-13

## 2021-05-28 RX ORDER — ASPIRIN 81 MG/1
81 TABLET, CHEWABLE ORAL DAILY
COMMUNITY
End: 2023-03-21

## 2021-05-28 NOTE — PROGRESS NOTES
Date of Encounter: 2021  Patient: Gabo Boo,  1940    SUBSEQUENT MEDICARE WELLNESS VISIT  Subjective   History of Presenting Illness  Chief complaint: Medicare wellness exam    Hypertension, recent regimen changed by cardiology, he is taking hydralazine twice daily along with the olmesartan and carvedilol.  No side effects on this regimen.  Blood pressure well controlled by her measurement in the office.    Paroxysmal atrial fibrillation with watchman device, not on anticoagulation.  I do recommend he take an aspirin 81 mg daily.    Familial hypercholesterolemia but recent lipids looked great, has not tolerated statin previously so for now we will hold.    Ascending aortic aneurysm was CT for surveillance ordered by cardiology.    Anemia, mild, monitoring, with no evidence of GI bleed.    Reducing vitamin D dose to 2000 units daily.    Insomnia doing well on current regimen of quetiapine, mirtazapine as needed.    Anxiety doing well no concerns.    Last seizure 3 years ago.  On Vimpat and clonazepam.    Lives with wife.  3 children.  Never smoker.  1 glass of red wine per day.  Exercises 5-7 times per week by walking.    Retired, worked as a consulting .  Has a living will, unsure of CODE STATUS.  Last colon cancer screening  with 5-year follow-up.  Status post [employer] COVID-19 vaccination.  Due for Shingrix, Td.    Pain  In the past 7 days, how much pain have you felt?  None    Review of Systems:  Negative for fever, congestion, chest pain upon exertion, shortness of breath, vision changes, vomiting, dysuria, lymphadenopathy, muscle weakness, numbness, mood changes, rashes.    Health Risk Assessment:    Recent Hospitalizations:  No hospitalization(s) within the last year.    Current Medical Providers:  Patient Care Team:  Tomas Lopez MD as PCP - General (Family Medicine)    Smoking Status:  Social History     Tobacco Use   Smoking Status Never Smoker   Smokeless  Tobacco Never Used       Alcohol Consumption:  Social History     Substance and Sexual Activity   Alcohol Use Yes   • Alcohol/week: 2.0 standard drinks   • Types: 1 Cans of beer, 1 Shots of liquor per week    Comment: daily       Depression Screen:   PHQ-2/PHQ-9 Depression Screening 5/28/2021   Little interest or pleasure in doing things 0   Feeling down, depressed, or hopeless 0   Trouble falling or staying asleep, or sleeping too much -   Feeling tired or having little energy -   Poor appetite or overeating -   Feeling bad about yourself - or that you are a failure or have let yourself or your family down -   Trouble concentrating on things, such as reading the newspaper or watching television -   Moving or speaking so slowly that other people could have noticed. Or the opposite - being so fidgety or restless that you have been moving around a lot more than usual -   Thoughts that you would be better off dead, or of hurting yourself in some way -   Total Score 0   If you checked off any problems, how difficult have these problems made it for you to do your work, take care of things at home, or get along with other people? -     I spent more than 16 minutes asking patient questions, counseling and documenting in the chart    Fall Risk Screen:  TRISTON Fall Risk Assessment was completed, and patient is at LOW risk for falls.Assessment completed on:3/11/2021    Health Habits and Functional and Cognitive Screening:  Functional & Cognitive Status 5/28/2021   Do you have difficulty preparing food and eating? No   Do you have difficulty bathing yourself, getting dressed or grooming yourself? No   Do you have difficulty using the toilet? No   Do you have difficulty moving around from place to place? No   Do you have trouble with steps or getting out of a bed or a chair? No   Do you need help using the phone?  No   Are you deaf or do you have serious difficulty hearing?  No   Do you need help with transportation? No   Do  you need help shopping? No   Do you need help preparing meals?  No   Do you need help with housework?  No   Do you need help with laundry? No   Do you need help taking your medications? No   Do you need help managing money? No   Do you ever drive or ride in a car without wearing a seat belt? No   Have you felt unusual stress, anger or loneliness in the last month? -   Who do you live with? -   If you need help, do you have trouble finding someone available to you? -   Do you have difficulty concentrating, remembering or making decisions? No       Does the patient have evidence of cognitive impairment? No    Aspirin use counseling:Start ASA 81 mg daily     Recent Lab Results:  Lab Results   Component Value Date     (H) 03/19/2021    CHLPL 157 03/19/2021    TRIG 79 03/19/2021    HDL 51 03/19/2021    LDL 91 03/19/2021    VLDL 15 03/19/2021    HGBA1C 5.4 03/19/2021        Age-appropriate Screening Schedule:  Refer to the list below for future screening recommendations based on patient's age, sex and/or medical conditions. Orders for these recommended tests are listed in the plan section. The patient has been provided with a written plan.    Health Maintenance   Topic Date Due   • ZOSTER VACCINE (2 of 3) 02/26/2008   • TDAP/TD VACCINES (2 - Td or Tdap) 12/08/2020   • LIPID PANEL  03/19/2022   • INFLUENZA VACCINE  Discontinued       Compared to one year ago, the patient feels his physical health is the same.  Compared to one year ago, the patient feels his mental health is the same.    Reviewed chart for potential of high risk medication in the elderly: yes  Reviewed chart for potential of harmful drug interactions in the elderly:yes    Advanced Care Planning:  Advance Care Planning   ACP discussion was held with the patient during this visit. Patient has an advance directive in EMR which is still valid.     The following portions of the patient's history were reviewed and updated as appropriate: allergies, current  medications, past family history, past medical history, past social history, past surgical history and problem list.    Patient Active Problem List   Diagnosis   • Anxiety   • Hyperlipidemia   • Hypertension   • Insomnia   • Fatigue   • Focal epilepsy (CMS/HCC)   • Hygroma   • Seizure (CMS/HCC)   • Partial seizures (CMS/HCC)   • Vitamin D deficiency   • Bilateral lower extremity edema   • H/O lead exposure   • Paroxysmal atrial fibrillation (CMS/HCC)   • Hx of adenomatous polyp of colon   • Presence of Watchman left atrial appendage closure device   • Ascending aortic aneurysm (CMS/HCC)   • Anemia     Past Medical History:   Diagnosis Date   • Abnormal barium swallow 07/08/2016    HH, reflux   • AF (paroxysmal atrial fibrillation) (CMS/HCC)    • Anxiety    • GERD (gastroesophageal reflux disease)    • H/O cataract     Dr Heron Hoffman   • Hernia, epigastric 10/14/2020    Added automatically from request for surgery [id number]   • Hiatal hernia    • Hyperlipidemia    • Hypertension    • Internal hemorrhoids    • Mood disorder (CMS/HCC) 7/27/2017   • PONV (postoperative nausea and vomiting)    • Seizures (CMS/HCC)     July 2013 was last seizure   • Tubular adenoma of colon    • Umbilical hernia without obstruction and without gangrene 10/14/2020    Added automatically from request for surgery [id number]     Past Surgical History:   Procedure Laterality Date   • CATARACT EXTRACTION Bilateral    • COLONOSCOPY  04/23/2013    Dr Galeano/[place], , tubular adenoma w/low grade dysplasia   • COLONOSCOPY N/A 11/3/2020    Procedure: COLONOSCOPY;  Surgeon: Gabo Power MD;  Location: [facility] MAIN OR;  Service: General;  Laterality: N/A;   • ENDOSCOPY N/A 12/16/2016    Procedure: ESOPHAGOGASTRODUODENOSCOPY WITH COLD BIOPSIES AND 54 FR KENNY DILATATION;  Surgeon: Shiraz Galeano MD;  Location: [facility] ENDOSCOPY;  Service:    • INGUINAL HERNIA REPAIR      as a child and again in 1970 approx.   • KRISTYN Left 03/2019    Left Atrial  Appendage Occlusion W KRISTYN   • TONSILLECTOMY     • VENTRAL/INCISIONAL HERNIA REPAIR N/A 11/3/2020    Procedure: VENTRAL HERNIA REPAIR WITH MESH;  Surgeon: Gabo Power MD;  Location: [facility] MAIN OR;  Service: General;  Laterality: N/A;   • WRIST SURGERY Right      Family History   Problem Relation Age of Onset   • Stroke Father    • Malig Hyperthermia Neg Hx        Current Outpatient Medications:   •  aspirin 81 MG chewable tablet, Chew 81 mg Daily., Disp: , Rfl:   •  Astaxanthin 4 MG capsule, Take 4 mg by mouth Daily., Disp: , Rfl:   •  B Complex Vitamins (VITAMIN B COMPLEX) capsule capsule, Take 1 capsule by mouth Daily., Disp: , Rfl:   •  Bioflavonoid Products (C COMPLEX PO), Take 1,000 mg by mouth Daily., Disp: , Rfl:   •  BLACK CURRANT SEED OIL PO, Take 1 tablet by mouth Daily., Disp: , Rfl:   •  carvedilol (COREG) 6.25 MG tablet, Take 6.25 mg by mouth., Disp: , Rfl:   •  CBD (cannabidiol) oral oil, Take 1 drop by mouth Daily., Disp: , Rfl:   •  Cholecalciferol (Vitamin D) 50 MCG (2000 UT) capsule, Take 2,000 capsules by mouth., Disp: , Rfl:   •  clonazePAM (KlonoPIN) 0.5 MG tablet, TAKE 1 TABLET BY MOUTH TWICE A DAY AS NEEDED (Patient taking differently: Take 0.5 mg by mouth 2 (Two) Times a Day As Needed for Anxiety.), Disp: 60 tablet, Rfl: 2  •  hydrALAZINE (APRESOLINE) 100 MG tablet, Take 50 mg by mouth 2 (Two) Times a Day As Needed., Disp: , Rfl: 11  •  lacosamide (Vimpat) 200 MG tablet, Take 200 mg by mouth 2 (Two) Times a Day., Disp: , Rfl:   •  mirtazapine (REMERON) 15 MG tablet, Take 15 mg by mouth Every Night., Disp: , Rfl:   •  Multiple Vitamin (MULTIVITAMIN PO), Take 1 tablet by mouth Daily., Disp: , Rfl:   •  Nutritional Supplements (PYCNOGENOL PO), Take 100 mg by mouth Every Morning., Disp: , Rfl:   •  olmesartan (BENICAR) 20 MG tablet, Take 20 mg by mouth Daily., Disp: , Rfl:   •  Omega-3 Fatty Acids (OMEGA-3 FISH OIL PO), Take 1 capsule by mouth Daily., Disp: , Rfl:   •  QUEtiapine fumarate  "ER (SEROquel XR) 50 MG tablet sustained-release 24 hour tablet, Take 50 mg by mouth At Night As Needed., Disp: , Rfl:   •  Trace Min CaCrCuFeKMgMnPSeZn (MINERALS PO), Take 1 tablet by mouth Daily., Disp: , Rfl:   •  TURMERIC PO, Take 200 mg by mouth Daily., Disp: , Rfl:   •  Ubiquinol 100 MG capsule, Take 100 mg by mouth Daily., Disp: , Rfl:   •  Unable to find, Take 1 each by mouth Daily. Liposomal C 1000 mg,, Disp: , Rfl:   •  Unable to find, Take 1 each by mouth Daily. Ageless Brain by [employer], HOLD ALL SUPPLEMENTS FOR SURGERY, Disp: , Rfl:   •  vitamin B-6 (PYRIDOXINE) 100 MG tablet, Take 100 mg by mouth Daily. HOLD ALL SUPPLEMENTS FOR SURGERY, Disp: , Rfl:   •  Vitamin D-Vitamin K (D3 + K2 Dots) 1000-90 UNIT-MCG tablet, Take 1 tablet/day by mouth Daily. HOLD ALL SUPPLEMENTS FOR SURGERY, Disp: , Rfl:   No Known Allergies  Social History     Tobacco Use   • Smoking status: Never Smoker   • Smokeless tobacco: Never Used   Vaping Use   • Vaping Use: Never used   Substance Use Topics   • Alcohol use: Yes     Alcohol/week: 2.0 standard drinks     Types: 1 Cans of beer, 1 Shots of liquor per week     Comment: daily   • Drug use: No          Objective   Physical Exam  Vitals:    05/28/21 1003   BP: 138/88   Pulse: 56   SpO2: 97%   Weight: 89.6 kg (197 lb 9.6 oz)   Height: 177.8 cm (70\")     Body mass index is 28.35 kg/m².  Discussed the patient's BMI with him. The BMI is in the acceptable range.    Constitutional: NAD.  Eyes: EOMI. PERRLA. Normal conjunctiva.  Ear, nose, mouth, throat: Normal external ear canals and TMs bilaterally.  Cardiovascular: RRR. No murmurs.  Trace edema of the ankles. Radial pulses 2+ bilaterally.  Pulmonary: CTA b/l. Good effort.  Integumentary: No lacerations or wounds on face or upper extremities.  Lymphatic: No anterior cervical lymphadenopathy.  Endocrine: No thyromegaly or palpable thyroid nodules.  Psychiatric: Normal affect. Normal thought content.       Assessment/Plan   Assessment " and Plan  Pleasant 80-year-old male with seizure disorder, paroxysmal atrial fibrillation with watchman device, ascending aortic aneurysm, hypertension, hyperlipidemia, history of prediabetes, vitamin D deficiency, insomnia, who presents for the following:    Advance Directive Discussion  Hearing Problem  Immunizations Discussed/Encouraged (specific immunizations; Td and Shingrix )    The above risks/problems have been discussed with the patient.  Pertinent information has been shared with the patient in the After Visit Summary.  Other plans as below:    Diagnoses and all orders for this visit:    1. Encounter for subsequent annual wellness visit (AWV) in Medicare patient (Primary)    2. Essential hypertension: Controlled on current regimen, will defer to cardiology since they are revamping this and are currently in contact with the patient but when taking hydralazine twice daily on his current regimen he is well controlled…    3. Paroxysmal atrial fibrillation (CMS/HCC): Recommend ASA daily, status post watchman so no anticoagulation indicated, in sinus rhythm today  4. Presence of Watchman left atrial appendage closure device    5. Familial hypercholesterolemia: Has not tolerated statins but recent lipids normal    6. Ascending aortic aneurysm (CMS/HCC): CT for surveillance pending    7. Vitamin D deficiency: Use dose from 10,000 daily to 2000 daily    8. Anemia, unspecified type: Proving, mild, continue to monitor    9. Seizure (CMS/HCC): continue to follow with neurology    10. Insomnia, unspecified type: Stable on current regimen    11. Bilateral lower extremity edema: Improving, minimal ankle edema    Follow-up in 6 months for general recheck, labs    An After Visit Summary and PPPS were given to the patient.      Tomas Lopez MD  Family Medicine  O: [phone]  C: [phone]    Disclaimer: Parts of this note were dictated by speech recognition. Minor errors in transcription may be present. Please call if  questions.

## 2021-11-15 ENCOUNTER — TELEPHONE (OUTPATIENT)
Dept: INTERNAL MEDICINE | Facility: CLINIC | Age: 81
End: 2021-11-15

## 2021-11-22 ENCOUNTER — TELEPHONE (OUTPATIENT)
Dept: INTERNAL MEDICINE | Facility: CLINIC | Age: 81
End: 2021-11-22

## 2021-11-22 NOTE — TELEPHONE ENCOUNTER
Caller: Gabo Boo    Relationship: Self     Best call back number: [phone] (M)    What orders are you requesting (i.e. lab or imaging): ROUTINE LAB WORK FOR A 6 MONTH FOLLOW UP     In what timeframe would the patient need to come in: TOMORROW OR Wednesday 11/23/21 OR 11/24/21     Where will you receive your lab/imaging services: AT A [facility] NEAR [facility]/ [facility]/ [facility]      SECOND CHOICE WOULD BE AROUND [facility] (IF ONLY NECESSARY)   IF THIS GETS TOO COMPLICATED HE WILL COME TO THE OFFICE, (OR WHEREVER WE NORMALLY SEND PATIENTS FOR LABS)      Additional notes:     PATIENT PREFERS TO GET SOME LABS DONE BEFORE HIS APPOINTMENT ON 11/30/21, SO HE'LL HAVE THE RESULTS TO DISCUSS WITH DR GONCALVES. THIS APPT ON 11/30/21 IS A 6 MONTH FOLLOW UP.     PLEASE GIVE PATIENT A CALL REGARDING GETTING LABS DONE TOMORROW OR Wednesday IF AT ALL POSSIBLE

## 2021-11-23 NOTE — TELEPHONE ENCOUNTER
Pt has 6mo appt on 11/30/21 at 9:45am and is aware that he will have labs done here that day and to fast before coming in.

## 2021-11-30 ENCOUNTER — OFFICE VISIT (OUTPATIENT)
Dept: INTERNAL MEDICINE | Facility: CLINIC | Age: 81
End: 2021-11-30

## 2021-11-30 VITALS
TEMPERATURE: 96.9 F | BODY MASS INDEX: 27.8 KG/M2 | HEART RATE: 66 BPM | WEIGHT: 194.2 LBS | OXYGEN SATURATION: 98 % | DIASTOLIC BLOOD PRESSURE: 90 MMHG | SYSTOLIC BLOOD PRESSURE: 170 MMHG | HEIGHT: 70 IN

## 2021-11-30 DIAGNOSIS — I10 PRIMARY HYPERTENSION: Primary | ICD-10-CM

## 2021-11-30 DIAGNOSIS — R60.0 BILATERAL LOWER EXTREMITY EDEMA: ICD-10-CM

## 2021-11-30 PROBLEM — D64.9 ANEMIA: Status: RESOLVED | Noted: 2021-03-21 | Resolved: 2021-11-30

## 2021-11-30 PROCEDURE — 99214 OFFICE O/P EST MOD 30 MIN: CPT | Performed by: FAMILY MEDICINE

## 2021-11-30 RX ORDER — HYDROCHLOROTHIAZIDE 25 MG/1
TABLET ORAL
Qty: 60 TABLET | Refills: 1 | Status: SHIPPED | OUTPATIENT
Start: 2021-11-30 | End: 2021-12-28 | Stop reason: SDUPTHER

## 2021-11-30 RX ORDER — OLMESARTAN MEDOXOMIL 40 MG/1
40 TABLET ORAL DAILY
COMMUNITY
End: 2021-12-28 | Stop reason: SDUPTHER

## 2021-11-30 RX ORDER — CARVEDILOL 12.5 MG/1
12.5 TABLET ORAL 2 TIMES DAILY WITH MEALS
COMMUNITY
End: 2021-12-28 | Stop reason: SDUPTHER

## 2021-11-30 NOTE — PROGRESS NOTES
Date of Encounter: 2021  Patient: Gabo Boo,  1940    Subjective   History of Presenting Illness  Chief complaint: Hypertension    Hypertension currently uncontrolled, he is taking olmesartan 40 mg, carvedilol 12.5 mg twice daily, and hydralazine 50 mg twice daily as needed for elevated blood pressure.  At home his blood pressures have remained elevated 150s-170s/90s-100s.  He has had no symptoms.  He has been on several other medications in the past including amlodipine and hydrochlorothiazide, I cannot find reasons that these were discontinued.  He does have chronic lower extremity edema of the ankles that did not improve with furosemide previously.  He has been evaluated for venous stasis and was found not to have any problems contributing to the swelling.    Review of Systems:  Negative for fever, cough, shortness of breath    The following portions of the patient's history were reviewed and updated as appropriate: allergies, current medications, past family history, past medical history, past social history, past surgical history and problem list.    Patient Active Problem List   Diagnosis   • Anxiety   • Hyperlipidemia   • Hypertension   • Insomnia   • Fatigue   • Focal epilepsy (HCC)   • Hygroma   • Seizure (HCC)   • Partial seizures (HCC)   • Vitamin D deficiency   • Bilateral lower extremity edema   • H/O lead exposure   • Paroxysmal atrial fibrillation (HCC)   • Hx of adenomatous polyp of colon   • Presence of Watchman left atrial appendage closure device   • Ascending aortic aneurysm (HCC)   • Anemia     Past Medical History:   Diagnosis Date   • Abnormal barium swallow 2016    HH, reflux   • AF (paroxysmal atrial fibrillation) (HCC)    • Anxiety    • GERD (gastroesophageal reflux disease)    • H/O cataract     Dr Heron Hoffman   • Hernia, epigastric 10/14/2020    Added automatically from request for surgery [id number]   • Hiatal hernia    • Hyperlipidemia    • Hypertension    •  Internal hemorrhoids    • Mood disorder (HCC) 7/27/2017   • PONV (postoperative nausea and vomiting)    • Seizures (HCC)     July 2013 was last seizure   • Tubular adenoma of colon    • Umbilical hernia without obstruction and without gangrene 10/14/2020    Added automatically from request for surgery [id number]     Past Surgical History:   Procedure Laterality Date   • CATARACT EXTRACTION Bilateral    • COLONOSCOPY  04/23/2013    Dr Galeano/E-tics, Ih, tubular adenoma w/low grade dysplasia   • COLONOSCOPY N/A 11/3/2020    Procedure: COLONOSCOPY;  Surgeon: Gabo Power MD;  Location: [facility] MAIN OR;  Service: General;  Laterality: N/A;   • ENDOSCOPY N/A 12/16/2016    Procedure: ESOPHAGOGASTRODUODENOSCOPY WITH COLD BIOPSIES AND 54 FR KENNY DILATATION;  Surgeon: Shiraz Galeano MD;  Location: [facility] ENDOSCOPY;  Service:    • INGUINAL HERNIA REPAIR      as a child and again in 1970 approx.   • KRISTYN Left 03/2019    Left Atrial Appendage Occlusion W KRISTYN   • TONSILLECTOMY     • VENTRAL/INCISIONAL HERNIA REPAIR N/A 11/3/2020    Procedure: VENTRAL HERNIA REPAIR WITH MESH;  Surgeon: Gabo Power MD;  Location: [facility] MAIN OR;  Service: General;  Laterality: N/A;   • WRIST SURGERY Right      Family History   Problem Relation Age of Onset   • Stroke Father    • Malig Hyperthermia Neg Hx        Current Outpatient Medications:   •  Astaxanthin 4 MG capsule, Take 4 mg by mouth Daily., Disp: , Rfl:   •  B Complex Vitamins (VITAMIN B COMPLEX) capsule capsule, Take 1 capsule by mouth Daily., Disp: , Rfl:   •  BLACK CURRANT SEED OIL PO, Take 1 tablet by mouth Daily., Disp: , Rfl:   •  carvedilol (COREG) 12.5 MG tablet, Take 12.5 mg by mouth 2 (Two) Times a Day With Meals., Disp: , Rfl:   •  CBD (cannabidiol) oral oil, Take 1 drop by mouth Daily., Disp: , Rfl:   •  Cholecalciferol (Vitamin D) 50 MCG (2000 UT) capsule, Take 2,000 capsules by mouth., Disp: , Rfl:   •  clonazePAM (KlonoPIN) 0.5 MG tablet, TAKE 1 TABLET BY MOUTH  TWICE A DAY AS NEEDED (Patient taking differently: Take 0.5 mg by mouth 2 (Two) Times a Day As Needed for Anxiety.), Disp: 60 tablet, Rfl: 2  •  hydrALAZINE (APRESOLINE) 100 MG tablet, Take 50 mg by mouth 2 (Two) Times a Day As Needed., Disp: , Rfl: 11  •  lacosamide (Vimpat) 200 MG tablet, Take 200 mg by mouth 2 (Two) Times a Day., Disp: , Rfl:   •  mirtazapine (REMERON) 15 MG tablet, Take 15 mg by mouth Every Night., Disp: , Rfl:   •  Multiple Vitamin (MULTIVITAMIN PO), Take 1 tablet by mouth Daily., Disp: , Rfl:   •  Nutritional Supplements (PYCNOGENOL PO), Take 100 mg by mouth Every Morning., Disp: , Rfl:   •  olmesartan (BENICAR) 40 MG tablet, Take 40 mg by mouth Daily., Disp: , Rfl:   •  Omega-3 Fatty Acids (OMEGA-3 FISH OIL PO), Take 1 capsule by mouth Daily., Disp: , Rfl:   •  QUEtiapine fumarate ER (SEROquel XR) 50 MG tablet sustained-release 24 hour tablet, Take 50 mg by mouth At Night As Needed., Disp: , Rfl:   •  TURMERIC PO, Take 200 mg by mouth Daily., Disp: , Rfl:   •  Ubiquinol 100 MG capsule, Take 100 mg by mouth Daily., Disp: , Rfl:   •  Unable to find, Take 1 each by mouth Daily. Liposomal C 1000 mg,, Disp: , Rfl:   •  vitamin B-6 (PYRIDOXINE) 100 MG tablet, Take 100 mg by mouth Daily. HOLD ALL SUPPLEMENTS FOR SURGERY, Disp: , Rfl:   •  VITAMIN K PO, Take 90 mcg by mouth., Disp: , Rfl:   •  aspirin 81 MG chewable tablet, Chew 81 mg Daily., Disp: , Rfl:   •  Bioflavonoid Products (C COMPLEX PO), Take 1,000 mg by mouth Daily., Disp: , Rfl:   •  hydroCHLOROthiazide (HYDRODIURIL) 25 MG tablet, Take 1 tab PO QD for blood pressure, Disp: 60 tablet, Rfl: 1  •  Trace Min CaCrCuFeKMgMnPSeZn (MINERALS PO), Take 1 tablet by mouth Daily., Disp: , Rfl:   •  Unable to find, Take 1 each by mouth Daily. Ageless Brain by [employer], HOLD ALL SUPPLEMENTS FOR SURGERY, Disp: , Rfl:   •  Vitamin D-Vitamin K (D3 + K2 Dots) 1000-90 UNIT-MCG tablet, Take 1 tablet/day by mouth Daily. HOLD ALL SUPPLEMENTS FOR SURGERY, Disp:  ", Rfl:   No Known Allergies  Social History     Tobacco Use   • Smoking status: Never Smoker   • Smokeless tobacco: Never Used   Vaping Use   • Vaping Use: Never used   Substance Use Topics   • Alcohol use: Yes     Alcohol/week: 2.0 standard drinks     Types: 1 Cans of beer, 1 Shots of liquor per week     Comment: daily   • Drug use: No          Objective   Physical Exam  Vitals:    11/30/21 0932   BP: 170/90   BP Location: Right arm   Patient Position: Sitting   Cuff Size: Large Adult   Pulse: 66   Temp: 96.9 °F (36.1 °C)   TempSrc: Temporal   SpO2: 98%   Weight: 88.1 kg (194 lb 3.2 oz)   Height: 177.8 cm (70\")     Body mass index is 27.86 kg/m².    Constitutional: NAD.  Eyes: EOMI. PERRLA. Normal conjunctiva.  Ear, nose, mouth, throat: No tonsillar exudates or erythema.   Normal nasal mucosa. Normal external ear canals and TMs bilaterally.  Cardiovascular: RRR.  2/6 systolic murmur right upper sternal border. No 1+ pitting edema of the ankles bilaterally. Radial pulses 2+ bilaterally.  Pulmonary: CTA b/l. Good effort.  Integumentary: No rashes or wounds on face or upper extremities.  Lymphatic: No anterior cervical lymphadenopathy.  Endocrine: No thyromegaly or palpable thyroid nodules.  Psychiatric: Normal affect. Normal thought content.     Assessment/Plan   Assessment and Plan  Pleasant 81-year-old male with seizure disorder, paroxysmal atrial fibrillation with watchman device, ascending aortic aneurysm, hypertension, hyperlipidemia, history of prediabetes, vitamin D deficiency, insomnia, who presents for the following:    Diagnoses and all orders for this visit:    1. Primary hypertension (Primary): Cannot see a reason why this was stopped, it was in combo with losartan which is why it may have been dropped when he switched to olmesartan.  No significant renal dysfunction.  Discussed risks and benefits of this medication, they will check ambulatory blood pressure and also follow-up in the office in 1 month.  - "     hydroCHLOROthiazide (HYDRODIURIL) 25 MG tablet; Take 1 tab PO QD for blood pressure  Dispense: 60 tablet; Refill: 1    2. Bilateral lower extremity edema: Discussed regular leg elevation, compression stockings    They are interested in a vascular screen which I think would be reasonable, they will be getting through [facility] and will contact us with the results    Tomas Lopez MD  Family Medicine  O: [phone]  C: [phone]    Disclaimer: Parts of this note were dictated by speech recognition. Minor errors in transcription may be present. Please call if questions.

## 2021-12-20 ENCOUNTER — TELEPHONE (OUTPATIENT)
Dept: INTERNAL MEDICINE | Facility: CLINIC | Age: 81
End: 2021-12-20

## 2021-12-20 NOTE — TELEPHONE ENCOUNTER
Caller: Gabo Boo    Relationship: Self    Best call back number:[phone]    What medications are you currently taking:   Current Outpatient Medications on File Prior to Visit   Medication Sig Dispense Refill   • aspirin 81 MG chewable tablet Chew 81 mg Daily.     • Astaxanthin 4 MG capsule Take 4 mg by mouth Daily.     • B Complex Vitamins (VITAMIN B COMPLEX) capsule capsule Take 1 capsule by mouth Daily.     • Bioflavonoid Products (C COMPLEX PO) Take 1,000 mg by mouth Daily.     • BLACK CURRANT SEED OIL PO Take 1 tablet by mouth Daily.     • carvedilol (COREG) 12.5 MG tablet Take 12.5 mg by mouth 2 (Two) Times a Day With Meals.     • CBD (cannabidiol) oral oil Take 1 drop by mouth Daily.     • Cholecalciferol (Vitamin D) 50 MCG (2000 UT) capsule Take 2,000 capsules by mouth.     • clonazePAM (KlonoPIN) 0.5 MG tablet TAKE 1 TABLET BY MOUTH TWICE A DAY AS NEEDED (Patient taking differently: Take 0.5 mg by mouth 2 (Two) Times a Day As Needed for Anxiety.) 60 tablet 2   • hydrALAZINE (APRESOLINE) 100 MG tablet Take 50 mg by mouth 2 (Two) Times a Day As Needed.  11   • hydroCHLOROthiazide (HYDRODIURIL) 25 MG tablet Take 1 tab PO QD for blood pressure 60 tablet 1   • lacosamide (Vimpat) 200 MG tablet Take 200 mg by mouth 2 (Two) Times a Day.     • mirtazapine (REMERON) 15 MG tablet Take 15 mg by mouth Every Night.     • Multiple Vitamin (MULTIVITAMIN PO) Take 1 tablet by mouth Daily.     • Nutritional Supplements (PYCNOGENOL PO) Take 100 mg by mouth Every Morning.     • olmesartan (BENICAR) 40 MG tablet Take 40 mg by mouth Daily.     • Omega-3 Fatty Acids (OMEGA-3 FISH OIL PO) Take 1 capsule by mouth Daily.     • QUEtiapine fumarate ER (SEROquel XR) 50 MG tablet sustained-release 24 hour tablet Take 50 mg by mouth At Night As Needed.     • Trace Min CaCrCuFeKMgMnPSeZn (MINERALS PO) Take 1 tablet by mouth Daily.     • TURMERIC PO Take 200 mg by mouth Daily.     • Ubiquinol 100 MG capsule Take 100 mg by  mouth Daily.     • Unable to find Take 1 each by mouth Daily. Liposomal C 1000 mg,     • Unable to find Take 1 each by mouth Daily. Ageless Brain by [employer], HOLD ALL SUPPLEMENTS FOR SURGERY     • vitamin B-6 (PYRIDOXINE) 100 MG tablet Take 100 mg by mouth Daily. HOLD ALL SUPPLEMENTS FOR SURGERY     • Vitamin D-Vitamin K (D3 + K2 Dots) 1000-90 UNIT-MCG tablet Take 1 tablet/day by mouth Daily. HOLD ALL SUPPLEMENTS FOR SURGERY     • VITAMIN K PO Take 90 mcg by mouth.       No current facility-administered medications on file prior to visit.          When did you start taking these medications: 11/30/21    Which medication are you concerned about: BLOOD PRESSURE MEDICATION    Who prescribed you this medication: DR GONCALVES    What are your concerns: BLOOD PRESSURE IS STILL TOO HIGH. 160/100 OR A LITTLE HIGHER IS AVERAGE     How long have you had these concerns: 2 WEEKS

## 2021-12-21 ENCOUNTER — TELEPHONE (OUTPATIENT)
Dept: INTERNAL MEDICINE | Facility: CLINIC | Age: 81
End: 2021-12-21

## 2021-12-21 NOTE — TELEPHONE ENCOUNTER
Patient had appt w/ Blessing Padilla DNP for BP check but Blessing was unable to keep appt.  Patient called with BP readings from 12/19 @ 184/89 and today was 152/85.  Unsure if Blessing will be available tomorrow or not.  Please advise.

## 2021-12-21 NOTE — TELEPHONE ENCOUNTER
PATIENT CALLED BACK, AFTER BEING ADVISED TO CALL HIS HEART DOCTOR.  PATIENTS HEART DOCTOR IS ON VACATION UNTIL THE 29 OF DEC AND PATIENT WOULD LIKE ADVISE PRIOR TO HEART DOCTORS RETURN.      PATIENT IS REQUESTING WHAT HE CAN DO TO GET HIS BLOOD PRESSURE DOWN TO A MORE NORMAL LEVEL.    PATIENT IS TAKING THE MEDICATIONS AS DIRECTED AND IT IS STILL RUNNING HIGH.       carvedilol (COREG) 12.5 MG tablet ONCE IN AM AND ONCE IN THE PM (TWICE DAILY)     olmesartan (BENICAR) 40 MG tablet ONCE DAILY     hydroCHLOROthiazide (HYDRODIURIL) 25 MG tablet  ONCE DAILY IN THE AM     PATIENT STATED THAT HE IS CONCERNED THAT HIS BLOOD PRESSURE IS STILL RUNNING HIGHER THAN WHAT IS CONSIDERED NORMAL.  PATIENT REQUESTING DR GONCALVES TO ADVISE WHAT HE COULD TRY OR DO DIFFERENTLY TO HELP HIS BLOOD PRESSURE COME DOWN PRIOR TO HEART DOCTOR RETURNING HIS CALL SOME TIME NEXT WEEK.    PATIENT FELT THIS TIME PERIOD WOULD BE TOO LONG TO GO WITH HIGH BLOOD PRESSURE.    PLEASE ADVISE  [phone] CELL  [phone] HOME   [phone] PATIENTS WIFE ALWAYS ANSWERS HER CELL PHONE

## 2021-12-22 NOTE — TELEPHONE ENCOUNTER
Per JNC-8 guidelines at his age goal is <150/<90, so he is very close to goal. I would not worry about a sustained blood pressure in the low 150s/90s, but I do want him to follow up with cardiology or come to our office for a manual measurement over the next few weeks

## 2021-12-23 NOTE — TELEPHONE ENCOUNTER
VONNIE for pt re: BP. Pt was advised, per Dr. Lopez, to f/u with cardio; pt has upcoming appt, where we can check his BP. I also advised him to continue to monitor BP, and keep a written log to bring with him to his appt 12/28/2021

## 2021-12-28 ENCOUNTER — TELEMEDICINE (OUTPATIENT)
Dept: INTERNAL MEDICINE | Facility: CLINIC | Age: 81
End: 2021-12-28

## 2021-12-28 DIAGNOSIS — I10 RESISTANT HYPERTENSION: Primary | ICD-10-CM

## 2021-12-28 PROCEDURE — 99214 OFFICE O/P EST MOD 30 MIN: CPT | Performed by: FAMILY MEDICINE

## 2021-12-28 RX ORDER — CARVEDILOL 12.5 MG/1
12.5 TABLET ORAL 2 TIMES DAILY WITH MEALS
Qty: 180 TABLET | Refills: 3 | Status: SHIPPED | OUTPATIENT
Start: 2021-12-28 | End: 2023-03-21

## 2021-12-28 RX ORDER — OLMESARTAN MEDOXOMIL 40 MG/1
40 TABLET ORAL DAILY
Qty: 90 TABLET | Refills: 3 | Status: SHIPPED | OUTPATIENT
Start: 2021-12-28 | End: 2022-12-29

## 2021-12-28 RX ORDER — HYDRALAZINE HYDROCHLORIDE 100 MG/1
50 TABLET, FILM COATED ORAL 3 TIMES DAILY
Refills: 11 | Status: SHIPPED | COMMUNITY
Start: 2021-12-28

## 2021-12-28 RX ORDER — HYDROCHLOROTHIAZIDE 25 MG/1
TABLET ORAL
Qty: 90 TABLET | Refills: 3 | Status: SHIPPED | OUTPATIENT
Start: 2021-12-28

## 2021-12-28 NOTE — PROGRESS NOTES
Date of Encounter: 2021  Patient: Gabo Boo,  1940    Subjective   History of Presenting Illness  Chief complaint: Hypertension    Patient presents via video visit to review ambulatory blood pressure measurements.  Unfortunately he recently tested positive for COVID-19 but the majority of his symptoms have resolved at the time of this video appointment but he is still in home quarantine.    Blood pressures at home are taken and alternating left and right upper extremities by automated blood pressure cuff around the same time of day.  After reviewing measurements they generally range from 160-190/90s-100s.  This is similar to recent in office measurement.  He denies chest pain, headache, shortness of breath, vision changes, dizziness.    He has a history of difficult to control hypertension.    Currently taking carvedilol, hydrochlorothiazide, olmesartan as scheduled, and hydralazine which is taken on an as-needed basis if patient feels his blood pressure is too high.    Review of Systems:  Negative for chest pain, shortness of breath, headache    The following portions of the patient's history were reviewed and updated as appropriate: allergies, current medications, past family history, past medical history, past social history, past surgical history and problem list.    Patient Active Problem List   Diagnosis   • Anxiety   • Hyperlipidemia   • Resistant hypertension   • Insomnia   • Fatigue   • Focal epilepsy (HCC)   • Hygroma   • Seizure (HCC)   • Partial seizures (HCC)   • Vitamin D deficiency   • Bilateral lower extremity edema   • H/O lead exposure   • Paroxysmal atrial fibrillation (HCC)   • Hx of adenomatous polyp of colon   • Presence of Watchman left atrial appendage closure device   • Ascending aortic aneurysm (HCC)     Past Medical History:   Diagnosis Date   • Abnormal barium swallow 2016    HH, reflux   • AF (paroxysmal atrial fibrillation) (HCC)    • Anemia 3/21/2021   •  Anxiety    • GERD (gastroesophageal reflux disease)    • H/O cataract     Dr Heron Hoffman   • Hernia, epigastric 10/14/2020    Added automatically from request for surgery [id number]   • Hiatal hernia    • Hyperlipidemia    • Hypertension    • Internal hemorrhoids    • Mood disorder (HCC) 7/27/2017   • PONV (postoperative nausea and vomiting)    • Seizures (HCC)     July 2013 was last seizure   • Tubular adenoma of colon    • Umbilical hernia without obstruction and without gangrene 10/14/2020    Added automatically from request for surgery [id number]     Past Surgical History:   Procedure Laterality Date   • CATARACT EXTRACTION Bilateral    • COLONOSCOPY  04/23/2013    Dr Galeano/BONY-ena, Ih, tubular adenoma w/low grade dysplasia   • COLONOSCOPY N/A 11/3/2020    Procedure: COLONOSCOPY;  Surgeon: Gabo Power MD;  Location: [facility] OR;  Service: General;  Laterality: N/A;   • ENDOSCOPY N/A 12/16/2016    Procedure: ESOPHAGOGASTRODUODENOSCOPY WITH COLD BIOPSIES AND 54 FR KENNY DILATATION;  Surgeon: Shiraz Galeano MD;  Location: [facility] ENDOSCOPY;  Service:    • INGUINAL HERNIA REPAIR      as a child and again in 1970 approx.   • KRISTYN Left 03/2019    Left Atrial Appendage Occlusion W KRISTYN   • TONSILLECTOMY     • VENTRAL/INCISIONAL HERNIA REPAIR N/A 11/3/2020    Procedure: VENTRAL HERNIA REPAIR WITH MESH;  Surgeon: Gabo Power MD;  Location: [facility] OR;  Service: General;  Laterality: N/A;   • WRIST SURGERY Right      Family History   Problem Relation Age of Onset   • Stroke Father    • Malig Hyperthermia Neg Hx        Current Outpatient Medications:   •  aspirin 81 MG chewable tablet, Chew 81 mg Daily., Disp: , Rfl:   •  Astaxanthin 4 MG capsule, Take 4 mg by mouth Daily., Disp: , Rfl:   •  B Complex Vitamins (VITAMIN B COMPLEX) capsule capsule, Take 1 capsule by mouth Daily., Disp: , Rfl:   •  Bioflavonoid Products (C COMPLEX PO), Take 1,000 mg by mouth Daily., Disp: , Rfl:   •  BLACK CURRANT SEED OIL PO,  Take 1 tablet by mouth Daily., Disp: , Rfl:   •  carvedilol (COREG) 12.5 MG tablet, Take 1 tablet by mouth 2 (Two) Times a Day With Meals., Disp: 180 tablet, Rfl: 3  •  CBD (cannabidiol) oral oil, Take 1 drop by mouth Daily., Disp: , Rfl:   •  Cholecalciferol (Vitamin D) 50 MCG (2000 UT) capsule, Take 2,000 capsules by mouth., Disp: , Rfl:   •  clonazePAM (KlonoPIN) 0.5 MG tablet, TAKE 1 TABLET BY MOUTH TWICE A DAY AS NEEDED (Patient taking differently: Take 0.5 mg by mouth 2 (Two) Times a Day As Needed for Anxiety.), Disp: 60 tablet, Rfl: 2  •  hydrALAZINE (APRESOLINE) 100 MG tablet, Take 0.5 tablets by mouth 3 (Three) Times a Day., Disp: , Rfl: 11  •  hydroCHLOROthiazide (HYDRODIURIL) 25 MG tablet, Take 1 tab PO QD for blood pressure, Disp: 90 tablet, Rfl: 3  •  lacosamide (Vimpat) 200 MG tablet, Take 200 mg by mouth 2 (Two) Times a Day., Disp: , Rfl:   •  mirtazapine (REMERON) 15 MG tablet, Take 15 mg by mouth Every Night., Disp: , Rfl:   •  Multiple Vitamin (MULTIVITAMIN PO), Take 1 tablet by mouth Daily., Disp: , Rfl:   •  Nutritional Supplements (PYCNOGENOL PO), Take 100 mg by mouth Every Morning., Disp: , Rfl:   •  olmesartan (BENICAR) 40 MG tablet, Take 1 tablet by mouth Daily., Disp: 90 tablet, Rfl: 3  •  Omega-3 Fatty Acids (OMEGA-3 FISH OIL PO), Take 1 capsule by mouth Daily., Disp: , Rfl:   •  QUEtiapine fumarate ER (SEROquel XR) 50 MG tablet sustained-release 24 hour tablet, Take 50 mg by mouth At Night As Needed., Disp: , Rfl:   •  Trace Min CaCrCuFeKMgMnPSeZn (MINERALS PO), Take 1 tablet by mouth Daily., Disp: , Rfl:   •  TURMERIC PO, Take 200 mg by mouth Daily., Disp: , Rfl:   •  Ubiquinol 100 MG capsule, Take 100 mg by mouth Daily., Disp: , Rfl:   •  Unable to find, Take 1 each by mouth Daily. Liposomal C 1000 mg,, Disp: , Rfl:   •  Unable to find, Take 1 each by mouth Daily. Ageless Brain by [employer], HOLD ALL SUPPLEMENTS FOR SURGERY, Disp: , Rfl:   •  vitamin B-6 (PYRIDOXINE) 100 MG tablet, Take  100 mg by mouth Daily. HOLD ALL SUPPLEMENTS FOR SURGERY, Disp: , Rfl:   •  Vitamin D-Vitamin K (D3 + K2 Dots) 1000-90 UNIT-MCG tablet, Take 1 tablet/day by mouth Daily. HOLD ALL SUPPLEMENTS FOR SURGERY, Disp: , Rfl:   •  VITAMIN K PO, Take 90 mcg by mouth., Disp: , Rfl:   No Known Allergies  Social History     Tobacco Use   • Smoking status: Never Smoker   • Smokeless tobacco: Never Used   Vaping Use   • Vaping Use: Never used   Substance Use Topics   • Alcohol use: Yes     Alcohol/week: 2.0 standard drinks     Types: 1 Cans of beer, 1 Shots of liquor per week     Comment: daily   • Drug use: No          Objective   Physical Exam  There were no vitals filed for this visit.  There is no height or weight on file to calculate BMI.    Constitutional: NAD.  Psychiatric: Normal affect. Normal thought content.     Assessment/Plan   Assessment and Plan  Pleasant 81-year-old male with seizure disorder, paroxysmal atrial fibrillation with watchman device, ascending aortic aneurysm, resistant hypertension, hyperlipidemia, history of prediabetes, vitamin D deficiency, insomnia, who presents for the following:    Diagnoses and all orders for this visit:    1. Resistant hypertension (Primary)  -     carvedilol (COREG) 12.5 MG tablet; Take 1 tablet by mouth 2 (Two) Times a Day With Meals.  Dispense: 180 tablet; Refill: 3  -     olmesartan (BENICAR) 40 MG tablet; Take 1 tablet by mouth Daily.  Dispense: 90 tablet; Refill: 3  -     hydroCHLOROthiazide (HYDRODIURIL) 25 MG tablet; Take 1 tab PO QD for blood pressure  Dispense: 90 tablet; Refill: 3    Patient has had difficult to control hypertension going back several years    He does have a ascending thoracic aortic aneurysm slightly enlarged on recent CT to 4.7 cm from 4.5 cm 2 years prior.  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    Most recent echo May 2019 demonstrates watchman device, EF 60 to 65%, mild aortic regurgitation and mitral regurgitation, trace  "tricuspid regurgitation, trivial circumferential pericardial effusion, this was noted to be similar to previous echocardiogram in March 2019.    Renal ultrasound normal 5/2018.    After review of his chart he has been on other \"third line\" antihypertensives including clonidine, regularly dosed hydralazine.    He does have lower extremity edema that did not improve previously with furosemide up to 3 times daily.    At this point I would like to make his hydralazine scheduled at 50 mg 3 times daily, will have him follow-up with blood pressures by phone or patient messaging in about 1 week.  Fortunately he is not having any symptoms.  Discussed reasons to go to the ER.  He will be out of town until February and we will try to manage this remotely, but we need to have him in for a manual verification as soon as he returns.    Tomas Lopez MD  Family Medicine  O: [phone]  C: [phone]    Disclaimer: Parts of this note were dictated by speech recognition. Minor errors in transcription may be present. Please call if questions.     "

## 2022-01-10 ENCOUNTER — TELEPHONE (OUTPATIENT)
Dept: INTERNAL MEDICINE | Facility: CLINIC | Age: 82
End: 2022-01-10

## 2022-01-10 NOTE — TELEPHONE ENCOUNTER
----- Message from Gabo Boo sent at 1/10/2022  2:21 PM EST -----  Regarding: Follow up you requested on blood pressure  Follow up you requested on blood pressure:  You requested Horacio Boo ([birth year]) take 50 mg hydrozoline  three times a day. (He had been taking PRN 50 mg one or two times a day, if he saw that the blood pressure readings were high.) After one week on the 50 mg, three times a day, you suggested he raise the dosage to 100 mg. At two weeks he is still taking 50 mg because it seems to be working now. Please advise whether you feel he should still go to the full strength of 100 mg  three times a day.   Blood pressure readings:  (This first set was taken in a series, 3 to 5 minutes apart from each other.) 185/97, 186/106, 192/107, 192/105 on December 28 (with pulse ranging from 47 to 54)  164/99 on December 29, Pulse  49  175/100 on December 30 Pulse 48  177/107 on January 2 Pulse 52  158/89 on January 4 Pulse 50  152/86, and three minutes later 128/71 on January 6 Pulse 51  134/78 on January 7 Pulse 48  138/80 on January 8 pulse 46  148/78 on January 9 pulse 52  121/75 on January 10 pulse 48

## 2022-01-13 NOTE — TELEPHONE ENCOUNTER
Pt called back asking about Dr. Lopez's recommendations re: BP readings form 1/10/2022.    I advised pt that Dr. Lopez is happy with his current BP and Dr. Lopez wants him to continue 50mg.     Pt wanted Dr. Lopez to know his BP reading today was 124/70.

## 2022-02-09 ENCOUNTER — TELEPHONE (OUTPATIENT)
Dept: INTERNAL MEDICINE | Facility: CLINIC | Age: 82
End: 2022-02-09

## 2022-02-09 NOTE — TELEPHONE ENCOUNTER
Caller: Gabo Boo    Relationship: Self    Best call back number:[phone]    What is the best time to reach you: ANY    Who are you requesting to speak with (clinical staff, provider,  specific staff member): CLINICAL    What was the call regarding: PATIENT IS CALLING TO FIND OUT WHEN DR. GONCALVES WOULD LIKE TO SEE HIM AGAIN?    Do you require a callback: YES

## 2022-02-10 NOTE — TELEPHONE ENCOUNTER
Spoke to patients wife, patient is not home but will have him call back to schedule- per Dr. Lopez patient to be seen end of February.

## 2022-03-18 ENCOUNTER — OFFICE VISIT (OUTPATIENT)
Dept: INTERNAL MEDICINE | Facility: CLINIC | Age: 82
End: 2022-03-18

## 2022-03-18 VITALS
TEMPERATURE: 96.9 F | BODY MASS INDEX: 27.9 KG/M2 | DIASTOLIC BLOOD PRESSURE: 78 MMHG | HEART RATE: 48 BPM | SYSTOLIC BLOOD PRESSURE: 148 MMHG | OXYGEN SATURATION: 98 % | WEIGHT: 194.89 LBS | HEIGHT: 70 IN

## 2022-03-18 DIAGNOSIS — I10 RESISTANT HYPERTENSION: Primary | ICD-10-CM

## 2022-03-18 DIAGNOSIS — E55.9 VITAMIN D DEFICIENCY: ICD-10-CM

## 2022-03-18 DIAGNOSIS — E78.5 HYPERLIPIDEMIA, UNSPECIFIED HYPERLIPIDEMIA TYPE: ICD-10-CM

## 2022-03-18 PROCEDURE — 99213 OFFICE O/P EST LOW 20 MIN: CPT | Performed by: FAMILY MEDICINE

## 2022-03-18 NOTE — PROGRESS NOTES
Date of Encounter: 2022  Patient: Gabo Boo,  1940    Subjective   History of Presenting Illness  Chief complaint: Blood pressure recheck    Hypertension, patient presents with blood pressure logs.  He has been adherent to his blood pressure medication including hydralazine 50 mg 3 times daily.  Amatory blood pressures range from 120-140s/70s-80s, he does have an occasional outlier of up to 170 systolic but that is not regular.  Pulse is generally 40s-50s.  He is able to tolerate regular exercise on this regimen without any chest pain, shortness of breath, or dizziness.    He does have a small growth on his right buttocks that he would like evaluated.  Does not cause him problems.     He is due for blood work.    Review of Systems:  Negative for fever, cough, shortness of breath, chest pain, dizziness    The following portions of the patient's history were reviewed and updated as appropriate: allergies, current medications, past family history, past medical history, past social history, past surgical history and problem list.    Patient Active Problem List   Diagnosis   • Anxiety   • Hyperlipidemia   • Resistant hypertension   • Insomnia   • Fatigue   • Focal epilepsy (HCC)   • Hygroma   • Seizure (HCC)   • Partial seizures (HCC)   • Vitamin D deficiency   • Bilateral lower extremity edema   • H/O lead exposure   • Paroxysmal atrial fibrillation (HCC)   • Hx of adenomatous polyp of colon   • Presence of Watchman left atrial appendage closure device   • Ascending aortic aneurysm (HCC)     Past Medical History:   Diagnosis Date   • Abnormal barium swallow 2016    HH, reflux   • AF (paroxysmal atrial fibrillation) (HCC)    • Anemia 3/21/2021   • Anxiety    • GERD (gastroesophageal reflux disease)    • H/O cataract     Dr Heron Hoffman   • Hernia, epigastric 10/14/2020    Added automatically from request for surgery [id number]   • Hiatal hernia    • Hyperlipidemia    • Hypertension    •  Internal hemorrhoids    • Mood disorder (HCC) 7/27/2017   • PONV (postoperative nausea and vomiting)    • Seizures (HCC)     July 2013 was last seizure   • Tubular adenoma of colon    • Umbilical hernia without obstruction and without gangrene 10/14/2020    Added automatically from request for surgery [id number]     Past Surgical History:   Procedure Laterality Date   • CATARACT EXTRACTION Bilateral    • COLONOSCOPY  04/23/2013    Dr Galeano/E-tics, Ih, tubular adenoma w/low grade dysplasia   • COLONOSCOPY N/A 11/3/2020    Procedure: COLONOSCOPY;  Surgeon: Gabo Power MD;  Location: [facility] MAIN OR;  Service: General;  Laterality: N/A;   • ENDOSCOPY N/A 12/16/2016    Procedure: ESOPHAGOGASTRODUODENOSCOPY WITH COLD BIOPSIES AND 54 FR KENNY DILATATION;  Surgeon: Shiraz Galeano MD;  Location: [facility] ENDOSCOPY;  Service:    • INGUINAL HERNIA REPAIR      as a child and again in 1970 approx.   • KRISTYN Left 03/2019    Left Atrial Appendage Occlusion W KRISTYN   • TONSILLECTOMY     • VENTRAL/INCISIONAL HERNIA REPAIR N/A 11/3/2020    Procedure: VENTRAL HERNIA REPAIR WITH MESH;  Surgeon: Gabo Power MD;  Location: [facility] MAIN OR;  Service: General;  Laterality: N/A;   • WRIST SURGERY Right      Family History   Problem Relation Age of Onset   • Stroke Father    • Malig Hyperthermia Neg Hx        Current Outpatient Medications:   •  aspirin 81 MG chewable tablet, Chew 81 mg Daily., Disp: , Rfl:   •  Astaxanthin 4 MG capsule, Take 4 mg by mouth Daily., Disp: , Rfl:   •  B Complex Vitamins (VITAMIN B COMPLEX) capsule capsule, Take 1 capsule by mouth Daily., Disp: , Rfl:   •  BLACK CURRANT SEED OIL PO, Take 1 tablet by mouth Daily., Disp: , Rfl:   •  carvedilol (COREG) 12.5 MG tablet, Take 1 tablet by mouth 2 (Two) Times a Day With Meals., Disp: 180 tablet, Rfl: 3  •  CBD (cannabidiol) oral oil, Take 1 drop by mouth Daily., Disp: , Rfl:   •  clonazePAM (KlonoPIN) 0.5 MG tablet, TAKE 1 TABLET BY MOUTH TWICE A DAY AS NEEDED  (Patient taking differently: Take 0.5 mg by mouth 2 (Two) Times a Day As Needed for Anxiety.), Disp: 60 tablet, Rfl: 2  •  hydrALAZINE (APRESOLINE) 100 MG tablet, Take 0.5 tablets by mouth 3 (Three) Times a Day., Disp: , Rfl: 11  •  hydroCHLOROthiazide (HYDRODIURIL) 25 MG tablet, Take 1 tab PO QD for blood pressure, Disp: 90 tablet, Rfl: 3  •  lacosamide (Vimpat) 200 MG tablet, Take 200 mg by mouth 2 (Two) Times a Day., Disp: , Rfl:   •  mirtazapine (REMERON) 15 MG tablet, Take 15 mg by mouth Every Night., Disp: , Rfl:   •  Multiple Vitamin (MULTIVITAMIN PO), Take 1 tablet by mouth Daily., Disp: , Rfl:   •  Nutritional Supplements (PYCNOGENOL PO), Take 100 mg by mouth Every Morning., Disp: , Rfl:   •  olmesartan (BENICAR) 40 MG tablet, Take 1 tablet by mouth Daily., Disp: 90 tablet, Rfl: 3  •  Omega-3 Fatty Acids (OMEGA-3 FISH OIL PO), Take 1 capsule by mouth Daily., Disp: , Rfl:   •  QUEtiapine fumarate ER (SEROquel XR) 50 MG tablet sustained-release 24 hour tablet, Take 50 mg by mouth At Night As Needed., Disp: , Rfl:   •  Trace Min CaCrCuFeKMgMnPSeZn (MINERALS PO), Take 1 tablet by mouth Daily., Disp: , Rfl:   •  Ubiquinol 100 MG capsule, Take 100 mg by mouth Daily., Disp: , Rfl:   •  Unable to find, Take 1 each by mouth Daily. Liposomal C 1000 mg,, Disp: , Rfl:   •  Unable to find, Take 1 each by mouth Daily. Ageless Brain by [employer], HOLD ALL SUPPLEMENTS FOR SURGERY, Disp: , Rfl:   •  vitamin B-6 (PYRIDOXINE) 100 MG tablet, Take 100 mg by mouth Daily. HOLD ALL SUPPLEMENTS FOR SURGERY, Disp: , Rfl:   •  Vitamin D-Vitamin K (D3 + K2 Dots) 1000-90 UNIT-MCG tablet, Take 1 tablet/day by mouth Daily. HOLD ALL SUPPLEMENTS FOR SURGERY, Disp: , Rfl:   No Known Allergies  Social History     Tobacco Use   • Smoking status: Never Smoker   • Smokeless tobacco: Never Used   Vaping Use   • Vaping Use: Never used   Substance Use Topics   • Alcohol use: Yes     Alcohol/week: 2.0 standard drinks     Types: 1 Cans of beer, 1  "Shots of liquor per week     Comment: daily   • Drug use: No          Objective   Physical Exam  Vitals:    03/18/22 1105   BP: 148/78   BP Location: Left arm   Patient Position: Sitting   Cuff Size: Large Adult   Pulse: (!) 48   Temp: 96.9 °F (36.1 °C)   TempSrc: Temporal   SpO2: 98%   Weight: 88.4 kg (194 lb 14.2 oz)   Height: 177.8 cm (70\")     Body mass index is 27.96 kg/m².    Constitutional: NAD.  Integumentary: 3 mm acrochordon on the right buttocks.  Psychiatric: Normal affect. Normal thought content.     Assessment/Plan   Assessment and Plan  Pleasant 81-year-old male with seizure disorder, paroxysmal atrial fibrillation with watchman device, ascending aortic aneurysm, resistant hypertension, hyperlipidemia, history of prediabetes, vitamin D deficiency, insomnia, who presents for the following:    Diagnoses and all orders for this visit:    1. Resistant hypertension (Primary): Improved with recent medication changes.  -     Urinalysis With Microscopic - Urine, Clean Catch    2. Hyperlipidemia, unspecified hyperlipidemia type  -     Lipid Panel  -     Comprehensive Metabolic Panel  -     CBC & Differential  -     Thyroid Panel With TSH    3. Vitamin D deficiency  -     Vitamin D 25 Hydroxy    We will update blood work as above.  Otherwise we will have him back for annual physical in 6 months    Tomas Lopez MD  Family Medicine  O: [phone]  C: [phone]    Disclaimer: Parts of this note were dictated by speech recognition. Minor errors in transcription may be present. Please call if questions.     "

## 2022-03-19 LAB
25(OH)D3+25(OH)D2 SERPL-MCNC: 59.2 NG/ML (ref 30–100)
ALBUMIN SERPL-MCNC: 4.2 G/DL (ref 3.7–4.7)
ALBUMIN/GLOB SERPL: 2.2 {RATIO} (ref 1.2–2.2)
ALP SERPL-CCNC: 75 IU/L (ref 44–121)
ALT SERPL-CCNC: 20 IU/L (ref 0–44)
APPEARANCE UR: CLEAR
AST SERPL-CCNC: 27 IU/L (ref 0–40)
BACTERIA #/AREA URNS HPF: NORMAL /[HPF]
BASOPHILS # BLD AUTO: 0.1 X10E3/UL (ref 0–0.2)
BASOPHILS NFR BLD AUTO: 2 %
BILIRUB SERPL-MCNC: 0.8 MG/DL (ref 0–1.2)
BILIRUB UR QL STRIP: NEGATIVE
BUN SERPL-MCNC: 11 MG/DL (ref 8–27)
BUN/CREAT SERPL: 13 (ref 10–24)
CALCIUM SERPL-MCNC: 9.2 MG/DL (ref 8.6–10.2)
CASTS URNS QL MICRO: NORMAL /LPF
CHLORIDE SERPL-SCNC: 95 MMOL/L (ref 96–106)
CHOLEST SERPL-MCNC: 145 MG/DL (ref 100–199)
CO2 SERPL-SCNC: 24 MMOL/L (ref 20–29)
COLOR UR: YELLOW
CREAT SERPL-MCNC: 0.84 MG/DL (ref 0.76–1.27)
EGFRCR SERPLBLD CKD-EPI 2021: 89 ML/MIN/1.73
EOSINOPHIL # BLD AUTO: 0.3 X10E3/UL (ref 0–0.4)
EOSINOPHIL NFR BLD AUTO: 7 %
EPI CELLS #/AREA URNS HPF: NORMAL /HPF (ref 0–10)
ERYTHROCYTE [DISTWIDTH] IN BLOOD BY AUTOMATED COUNT: 12.8 % (ref 11.6–15.4)
FT4I SERPL CALC-MCNC: 2.1 (ref 1.2–4.9)
GLOBULIN SER CALC-MCNC: 1.9 G/DL (ref 1.5–4.5)
GLUCOSE SERPL-MCNC: 105 MG/DL (ref 65–99)
GLUCOSE UR QL STRIP: NEGATIVE
HCT VFR BLD AUTO: 36.9 % (ref 37.5–51)
HDLC SERPL-MCNC: 54 MG/DL
HGB BLD-MCNC: 12.6 G/DL (ref 13–17.7)
HGB UR QL STRIP: NEGATIVE
IMM GRANULOCYTES # BLD AUTO: 0 X10E3/UL (ref 0–0.1)
IMM GRANULOCYTES NFR BLD AUTO: 1 %
KETONES UR QL STRIP: NEGATIVE
LDLC SERPL CALC-MCNC: 79 MG/DL (ref 0–99)
LEUKOCYTE ESTERASE UR QL STRIP: NEGATIVE
LYMPHOCYTES # BLD AUTO: 0.8 X10E3/UL (ref 0.7–3.1)
LYMPHOCYTES NFR BLD AUTO: 20 %
MCH RBC QN AUTO: 33.4 PG (ref 26.6–33)
MCHC RBC AUTO-ENTMCNC: 34.1 G/DL (ref 31.5–35.7)
MCV RBC AUTO: 98 FL (ref 79–97)
MICRO URNS: NORMAL
MICRO URNS: NORMAL
MONOCYTES # BLD AUTO: 0.5 X10E3/UL (ref 0.1–0.9)
MONOCYTES NFR BLD AUTO: 12 %
NEUTROPHILS # BLD AUTO: 2.3 X10E3/UL (ref 1.4–7)
NEUTROPHILS NFR BLD AUTO: 58 %
NITRITE UR QL STRIP: NEGATIVE
PH UR STRIP: 7 [PH] (ref 5–7.5)
PLATELET # BLD AUTO: 226 X10E3/UL (ref 150–450)
POTASSIUM SERPL-SCNC: 3.9 MMOL/L (ref 3.5–5.2)
PROT SERPL-MCNC: 6.1 G/DL (ref 6–8.5)
PROT UR QL STRIP: NEGATIVE
RBC # BLD AUTO: 3.77 X10E6/UL (ref 4.14–5.8)
RBC #/AREA URNS HPF: NORMAL /HPF (ref 0–2)
SODIUM SERPL-SCNC: 133 MMOL/L (ref 134–144)
SP GR UR STRIP: 1.01 (ref 1–1.03)
T3RU NFR SERPL: 29 % (ref 24–39)
T4 SERPL-MCNC: 7.4 UG/DL (ref 4.5–12)
TRIGL SERPL-MCNC: 58 MG/DL (ref 0–149)
TSH SERPL DL<=0.005 MIU/L-ACNC: 2.98 UIU/ML (ref 0.45–4.5)
UROBILINOGEN UR STRIP-MCNC: 0.2 MG/DL (ref 0.2–1)
VLDLC SERPL CALC-MCNC: 12 MG/DL (ref 5–40)
WBC # BLD AUTO: 4 X10E3/UL (ref 3.4–10.8)
WBC #/AREA URNS HPF: NORMAL /HPF (ref 0–5)

## 2022-03-21 NOTE — PROGRESS NOTES
Your anemia remains mild and is improving.  We will continue to monitor.  Otherwise I have no concerns.

## 2022-08-31 ENCOUNTER — TELEPHONE (OUTPATIENT)
Dept: SURGERY | Facility: CLINIC | Age: 82
End: 2022-08-31

## 2022-09-23 ENCOUNTER — OFFICE VISIT (OUTPATIENT)
Dept: INTERNAL MEDICINE | Facility: CLINIC | Age: 82
End: 2022-09-23

## 2022-09-23 VITALS
BODY MASS INDEX: 27.52 KG/M2 | OXYGEN SATURATION: 98 % | WEIGHT: 191.8 LBS | TEMPERATURE: 96.2 F | SYSTOLIC BLOOD PRESSURE: 122 MMHG | HEART RATE: 61 BPM | DIASTOLIC BLOOD PRESSURE: 84 MMHG

## 2022-09-23 DIAGNOSIS — E78.5 HYPERLIPIDEMIA, UNSPECIFIED HYPERLIPIDEMIA TYPE: ICD-10-CM

## 2022-09-23 DIAGNOSIS — R56.9 SEIZURE: ICD-10-CM

## 2022-09-23 DIAGNOSIS — Z00.00 ENCOUNTER FOR SUBSEQUENT ANNUAL WELLNESS VISIT (AWV) IN MEDICARE PATIENT: Primary | ICD-10-CM

## 2022-09-23 DIAGNOSIS — G47.00 INSOMNIA, UNSPECIFIED TYPE: ICD-10-CM

## 2022-09-23 DIAGNOSIS — I71.21 ASCENDING AORTIC ANEURYSM: ICD-10-CM

## 2022-09-23 DIAGNOSIS — Z95.818 PRESENCE OF WATCHMAN LEFT ATRIAL APPENDAGE CLOSURE DEVICE: ICD-10-CM

## 2022-09-23 DIAGNOSIS — R73.09 ELEVATED GLUCOSE: ICD-10-CM

## 2022-09-23 DIAGNOSIS — R60.0 BILATERAL LOWER EXTREMITY EDEMA: ICD-10-CM

## 2022-09-23 DIAGNOSIS — I48.0 PAROXYSMAL ATRIAL FIBRILLATION: ICD-10-CM

## 2022-09-23 DIAGNOSIS — E55.9 VITAMIN D DEFICIENCY: ICD-10-CM

## 2022-09-23 DIAGNOSIS — I10 RESISTANT HYPERTENSION: ICD-10-CM

## 2022-09-23 DIAGNOSIS — D64.9 MILD ANEMIA: ICD-10-CM

## 2022-09-23 PROCEDURE — 1159F MED LIST DOCD IN RCRD: CPT | Performed by: FAMILY MEDICINE

## 2022-09-23 PROCEDURE — G0439 PPPS, SUBSEQ VISIT: HCPCS | Performed by: FAMILY MEDICINE

## 2022-09-23 PROCEDURE — 1170F FXNL STATUS ASSESSED: CPT | Performed by: FAMILY MEDICINE

## 2022-09-23 PROCEDURE — 1126F AMNT PAIN NOTED NONE PRSNT: CPT | Performed by: FAMILY MEDICINE

## 2022-09-23 RX ORDER — IBUPROFEN/PSEUDOEPHEDRINE HCL 200MG-30MG
TABLET ORAL
COMMUNITY

## 2022-09-23 NOTE — PROGRESS NOTES
Date of Encounter: 2022  Patient: Gabo Boo,  1940    SUBSEQUENT MEDICARE WELLNESS VISIT  Subjective   History of Presenting Illness  Chief complaint: Annual physical    No acute concerns.    Hypertension, controlled with new home monitor and on triage check.  No side effects on current regimen.    Paroxysmal atrial fibrillation with watchman device on aspirin 81 mg once daily.  Not currently symptomatic.    Ascending aortic aneurysm stable at 5.1 cm on 2022 currently being managed by cardiology every 6 months.    Hyperlipidemia but recent lipids are normal with an LDL of 79.  He has not tolerated statins previously.      Anemia, mild, monitoring, with no evidence of GI bleed.     Vitamin D on supplementation.     Insomnia doing well on current regimen of quetiapine.     Seizure disorder, last seizure over 4 years ago.  On Vimpat and clonazepam.  Follows with neurology.     Lives with wife.  3 children.  Never smoker.  1 glass of red wine per day.  Exercises 5-7 times per week by walking. Healthy diet, has to watch his sweet tooth. Retired, worked as a consulting .  Has a living will on file. Last colon cancer screening  with 5-year follow-up.  Discussed bivalent COVID-vaccine, influenza, Shingrix and Td which he would like to consider.    Pain  In the past 7 days, how much pain have you felt? None    Review of Systems:  Negative for fever, congestion, chest pain upon exertion, shortness of breath, vision changes, vomiting, dysuria, lymphadenopathy, muscle weakness, numbness, mood changes, rashes.    Health Risk Assessment:    Recent Hospitalizations:  No hospitalization(s) within the last year.    Current Medical Providers:  Patient Care Team:  Tomas Lopez MD as PCP - General (Family Medicine)    Smoking Status:  Social History     Tobacco Use   Smoking Status Never Smoker   Smokeless Tobacco Never Used       Alcohol Consumption:  Social History     Substance and Sexual  Activity   Alcohol Use Yes   • Alcohol/week: 2.0 standard drinks   • Types: 1 Cans of beer, 1 Shots of liquor per week    Comment: daily       Depression Screen:   PHQ-2/PHQ-9 Depression Screening 3/18/2022   Retired PHQ-9 Total Score -   Retired Total Score -   Little Interest or Pleasure in Doing Things 0-->not at all   Feeling Down, Depressed or Hopeless 0-->not at all   PHQ-9: Brief Depression Severity Measure Score 0     I spent more than 16 minutes asking patient questions, counseling and documenting in the chart    Fall Risk Screen:  TRISTON Fall Risk Assessment has not been completed.    Health Habits and Functional and Cognitive Screening:  Functional & Cognitive Status 5/28/2021   Do you have difficulty preparing food and eating? No   Do you have difficulty bathing yourself, getting dressed or grooming yourself? No   Do you have difficulty using the toilet? No   Do you have difficulty moving around from place to place? No   Do you have trouble with steps or getting out of a bed or a chair? No   Do you need help using the phone?  No   Are you deaf or do you have serious difficulty hearing?  No   Do you need help with transportation? No   Do you need help shopping? No   Do you need help preparing meals?  No   Do you need help with housework?  No   Do you need help with laundry? No   Do you need help taking your medications? No   Do you need help managing money? No   Do you ever drive or ride in a car without wearing a seat belt? No   Have you felt unusual stress, anger or loneliness in the last month? -   Who do you live with? -   If you need help, do you have trouble finding someone available to you? -   Do you have difficulty concentrating, remembering or making decisions? No       Does the patient have evidence of cognitive impairment? No    Aspirin use counseling:Taking ASA appropriately as indicated    Recent Lab Results:        Age-appropriate Screening Schedule:  Refer to the list below for future  screening recommendations based on patient's age, sex and/or medical conditions. Orders for these recommended tests are listed in the plan section. The patient has been provided with a written plan.    Health Maintenance   Topic Date Due   • ZOSTER VACCINE (2 of 3) 02/26/2008   • TDAP/TD VACCINES (2 - Td or Tdap) 12/08/2020   • LIPID PANEL  03/18/2023   • INFLUENZA VACCINE  Discontinued       Compared to one year ago, the patient feels his physical health is the same.  Compared to one year ago, the patient feels his mental health is the same.    Reviewed chart for potential of high risk medication in the elderly: yes  Reviewed chart for potential of harmful drug interactions in the elderly:yes    Advanced Care Planning:  Advance Care Planning   ACP discussion was held with the patient during this visit. Patient has an advance directive in EMR which is still valid.     The following portions of the patient's history were reviewed and updated as appropriate: allergies, current medications, past family history, past medical history, past social history, past surgical history and problem list.    Patient Active Problem List   Diagnosis   • Anxiety   • Hyperlipidemia   • Resistant hypertension   • Insomnia   • Fatigue   • Focal epilepsy (HCC)   • Hygroma   • Seizure (HCC)   • Partial seizures (HCC)   • Vitamin D deficiency   • Bilateral lower extremity edema   • H/O lead exposure   • Paroxysmal atrial fibrillation (HCC)   • Hx of adenomatous polyp of colon   • Presence of Watchman left atrial appendage closure device   • Ascending aortic aneurysm (HCC)     Past Medical History:   Diagnosis Date   • Abnormal barium swallow 07/08/2016    HH, reflux   • AF (paroxysmal atrial fibrillation) (HCC)    • Anemia 3/21/2021   • Anxiety    • GERD (gastroesophageal reflux disease)    • H/O cataract     Dr Heron Hoffman   • Hernia, epigastric 10/14/2020    Added automatically from request for surgery [id number]   • Hiatal hernia    •  Hyperlipidemia    • Hypertension    • Internal hemorrhoids    • Mood disorder (HCC) 7/27/2017   • PONV (postoperative nausea and vomiting)    • Seizures (HCC)     July 2013 was last seizure   • Tubular adenoma of colon    • Umbilical hernia without obstruction and without gangrene 10/14/2020    Added automatically from request for surgery [id number]     Past Surgical History:   Procedure Laterality Date   • CATARACT EXTRACTION Bilateral    • COLONOSCOPY  04/23/2013    Dr Galeano/BONY-tics, Ih, tubular adenoma w/low grade dysplasia   • COLONOSCOPY N/A 11/3/2020    Procedure: COLONOSCOPY;  Surgeon: Gabo Power MD;  Location: [facility] OR;  Service: General;  Laterality: N/A;   • ENDOSCOPY N/A 12/16/2016    Procedure: ESOPHAGOGASTRODUODENOSCOPY WITH COLD BIOPSIES AND 54 FR KENNY DILATATION;  Surgeon: Shiraz Galeano MD;  Location: [facility] ENDOSCOPY;  Service:    • INGUINAL HERNIA REPAIR      as a child and again in 1970 approx.   • KRISTYN Left 03/2019    Left Atrial Appendage Occlusion W KRISTYN   • TONSILLECTOMY     • VENTRAL/INCISIONAL HERNIA REPAIR N/A 11/3/2020    Procedure: VENTRAL HERNIA REPAIR WITH MESH;  Surgeon: Gabo Power MD;  Location: [facility] MAIN OR;  Service: General;  Laterality: N/A;   • WRIST SURGERY Right      Family History   Problem Relation Age of Onset   • Stroke Father    • Malig Hyperthermia Neg Hx        Current Outpatient Medications:   •  aspirin 81 MG chewable tablet, Chew 81 mg Daily., Disp: , Rfl:   •  Astaxanthin 4 MG capsule, Take 4 mg by mouth Daily., Disp: , Rfl:   •  B Complex Vitamins (VITAMIN B COMPLEX) capsule capsule, Take 1 capsule by mouth Daily., Disp: , Rfl:   •  BLACK CURRANT SEED OIL PO, Take 1 tablet by mouth Daily., Disp: , Rfl:   •  carvedilol (COREG) 12.5 MG tablet, Take 1 tablet by mouth 2 (Two) Times a Day With Meals., Disp: 180 tablet, Rfl: 3  •  CBD (cannabidiol) oral oil, Take 1 drop by mouth Daily., Disp: , Rfl:   •  clonazePAM (KlonoPIN) 0.5 MG tablet, TAKE 1  TABLET BY MOUTH TWICE A DAY AS NEEDED (Patient taking differently: Take 0.5 mg by mouth 2 (Two) Times a Day As Needed for Anxiety.), Disp: 60 tablet, Rfl: 2  •  hydrALAZINE (APRESOLINE) 100 MG tablet, Take 0.5 tablets by mouth 3 (Three) Times a Day., Disp: , Rfl: 11  •  hydroCHLOROthiazide (HYDRODIURIL) 25 MG tablet, Take 1 tab PO QD for blood pressure, Disp: 90 tablet, Rfl: 3  •  lacosamide (Vimpat) 200 MG tablet, Take 200 mg by mouth 2 (Two) Times a Day., Disp: , Rfl:   •  mirtazapine (REMERON) 15 MG tablet, Take 15 mg by mouth Every Night., Disp: , Rfl:   •  Multiple Vitamin (MULTIVITAMIN PO), Take 1 tablet by mouth Daily., Disp: , Rfl:   •  Nutritional Supplements (PYCNOGENOL PO), Take 100 mg by mouth Every Morning., Disp: , Rfl:   •  olmesartan (BENICAR) 40 MG tablet, Take 1 tablet by mouth Daily., Disp: 90 tablet, Rfl: 3  •  Omega-3 Fatty Acids (OMEGA-3 FISH OIL PO), Take 1 capsule by mouth Daily., Disp: , Rfl:   •  QUEtiapine fumarate ER (SEROquel XR) 50 MG tablet sustained-release 24 hour tablet, Take 50 mg by mouth At Night As Needed., Disp: , Rfl:   •  Trace Min CaCrCuFeKMgMnPSeZn (MINERALS PO), Take 1 tablet by mouth Daily., Disp: , Rfl:   •  Ubiquinol 100 MG capsule, Take 100 mg by mouth Daily., Disp: , Rfl:   •  Unable to find, Take 1 each by mouth Daily. Liposomal C 1000 mg,, Disp: , Rfl:   •  Unable to find, Take 1 each by mouth Daily. Ageless Brain by [employer], HOLD ALL SUPPLEMENTS FOR SURGERY, Disp: , Rfl:   •  vitamin B-6 (PYRIDOXINE) 100 MG tablet, Take 100 mg by mouth Daily. HOLD ALL SUPPLEMENTS FOR SURGERY, Disp: , Rfl:   •  Vitamin D-Vitamin K (D3 + K2 Dots) 1000-90 UNIT-MCG tablet, Take 1 tablet/day by mouth Daily. HOLD ALL SUPPLEMENTS FOR SURGERY, Disp: , Rfl:   No Known Allergies  Social History     Tobacco Use   • Smoking status: Never Smoker   • Smokeless tobacco: Never Used   Vaping Use   • Vaping Use: Never used   Substance Use Topics   • Alcohol use: Yes     Alcohol/week: 2.0 standard  drinks     Types: 1 Cans of beer, 1 Shots of liquor per week     Comment: daily   • Drug use: No          Objective   Physical Exam  There were no vitals filed for this visit.  There is no height or weight on file to calculate BMI.  Discussed the patient's BMI with him. The BMI is in the acceptable range.    Constitutional: NAD.  Eyes: EOMI. PERRLA. Normal conjunctiva.  Ear, nose, mouth, throat: No tonsillar exudates or erythema.   Normal nasal mucosa. Normal external ear canals and TMs bilaterally.  Cardiovascular: Irregularly irregular. No murmurs. No LE edema b/l. Radial pulses 2+ bilaterally.  Pulmonary: CTA b/l. Good effort.  Integumentary: No lacerations or wounds on face or upper extremities.  Lymphatic: No anterior cervical lymphadenopathy.  Endocrine: No thyromegaly or palpable thyroid nodules.  Psychiatric: Normal affect. Normal thought content.  Gastrointestinal: Nondistended. No hepatosplenomegaly. No focal tenderness to palpation. Normal bowel sounds.     Assessment & Plan   Assessment and Plan  Pleasant 82-year-old male with seizure disorder, paroxysmal atrial fibrillation with watchman device, ascending aortic aneurysm, resistant hypertension, hyperlipidemia, history of prediabetes, vitamin D deficiency, insomnia, who presents for the following:    Advance Directive Discussion  Immunizations Discussed/Encouraged (specific immunizations; Td, Influenza, Shingrix and COVID19 )    The above risks/problems have been discussed with the patient.  Pertinent information has been shared with the patient in the After Visit Summary.  Other plans as below:    Diagnoses and all orders for this visit:    1. Encounter for subsequent annual wellness visit (AWV) in Medicare patient (Primary): We discussed preventative care including age and patient-appropriate immunizations and cancer screening. We also discussed the importance of exercise and a healthy diet. This is their annual preventative exam.    2. Resistant  hypertension: Controlled    3. Paroxysmal atrial fibrillation (HCC): Continue aspirin, rate control and routine follow-up with cardiology.  4. Presence of Watchman left atrial appendage closure device    5. Ascending aortic aneurysm (HCC): Continue to follow with cardiology    6. Hyperlipidemia, unspecified hyperlipidemia type: Did not tolerate statins previously, lipids are very good  -     CBC & Differential  -     Comprehensive Metabolic Panel  -     Thyroid Panel With TSH  -     Lipid Panel    7. Vitamin D deficiency  -     Vitamin D 25 Hydroxy    8. Seizure (HCC): Controlled, continue to follow with neurology.    9. Insomnia, unspecified type: Controlled    10. Bilateral lower extremity edema: Controlled with regular leg elevation    11. Mild anemia  -     Iron Profile  -     Ferritin  -     Folate  -     Vitamin B12    12. Elevated glucose  -     Hemoglobin A1c    Follow Up:  Since his blood work has been stable I am okay to moving to annual checkups if blood work as above does not reveal any major concerns    An After Visit Summary and PPPS were given to the patient.      Tomas Lopez MD  Family Medicine  O: [phone]  C: [phone]    Disclaimer: Parts of this note were dictated by speech recognition. Minor errors in transcription may be present. Please call if questions.

## 2022-09-24 LAB
25(OH)D3+25(OH)D2 SERPL-MCNC: 38.7 NG/ML (ref 30–100)
ALBUMIN SERPL-MCNC: 4.1 G/DL (ref 3.7–4.7)
ALBUMIN/GLOB SERPL: 2 {RATIO} (ref 1.2–2.2)
ALP SERPL-CCNC: 89 IU/L (ref 44–121)
ALT SERPL-CCNC: 24 IU/L (ref 0–44)
AST SERPL-CCNC: 29 IU/L (ref 0–40)
BASOPHILS # BLD AUTO: 0.1 X10E3/UL (ref 0–0.2)
BASOPHILS NFR BLD AUTO: 2 %
BILIRUB SERPL-MCNC: 1.1 MG/DL (ref 0–1.2)
BUN SERPL-MCNC: 12 MG/DL (ref 8–27)
BUN/CREAT SERPL: 12 (ref 10–24)
CALCIUM SERPL-MCNC: 9.2 MG/DL (ref 8.6–10.2)
CHLORIDE SERPL-SCNC: 97 MMOL/L (ref 96–106)
CHOLEST SERPL-MCNC: 136 MG/DL (ref 100–199)
CO2 SERPL-SCNC: 27 MMOL/L (ref 20–29)
CREAT SERPL-MCNC: 0.99 MG/DL (ref 0.76–1.27)
EGFRCR SERPLBLD CKD-EPI 2021: 77 ML/MIN/1.73
EOSINOPHIL # BLD AUTO: 0.2 X10E3/UL (ref 0–0.4)
EOSINOPHIL NFR BLD AUTO: 5 %
ERYTHROCYTE [DISTWIDTH] IN BLOOD BY AUTOMATED COUNT: 12.8 % (ref 11.6–15.4)
FERRITIN SERPL-MCNC: 215 NG/ML (ref 30–400)
FOLATE SERPL-MCNC: >20 NG/ML
FT4I SERPL CALC-MCNC: 2 (ref 1.2–4.9)
GLOBULIN SER CALC-MCNC: 2.1 G/DL (ref 1.5–4.5)
GLUCOSE SERPL-MCNC: 108 MG/DL (ref 65–99)
HBA1C MFR BLD: 5.8 % (ref 4.8–5.6)
HCT VFR BLD AUTO: 39.9 % (ref 37.5–51)
HDLC SERPL-MCNC: 44 MG/DL
HGB BLD-MCNC: 13.7 G/DL (ref 13–17.7)
IMM GRANULOCYTES # BLD AUTO: 0 X10E3/UL (ref 0–0.1)
IMM GRANULOCYTES NFR BLD AUTO: 0 %
IRON SATN MFR SERPL: 45 % (ref 15–55)
IRON SERPL-MCNC: 130 UG/DL (ref 38–169)
LDLC SERPL CALC-MCNC: 81 MG/DL (ref 0–99)
LYMPHOCYTES # BLD AUTO: 0.7 X10E3/UL (ref 0.7–3.1)
LYMPHOCYTES NFR BLD AUTO: 16 %
MCH RBC QN AUTO: 34.1 PG (ref 26.6–33)
MCHC RBC AUTO-ENTMCNC: 34.3 G/DL (ref 31.5–35.7)
MCV RBC AUTO: 99 FL (ref 79–97)
MONOCYTES # BLD AUTO: 0.5 X10E3/UL (ref 0.1–0.9)
MONOCYTES NFR BLD AUTO: 10 %
NEUTROPHILS # BLD AUTO: 3 X10E3/UL (ref 1.4–7)
NEUTROPHILS NFR BLD AUTO: 67 %
PLATELET # BLD AUTO: 223 X10E3/UL (ref 150–450)
POTASSIUM SERPL-SCNC: 3.9 MMOL/L (ref 3.5–5.2)
PROT SERPL-MCNC: 6.2 G/DL (ref 6–8.5)
RBC # BLD AUTO: 4.02 X10E6/UL (ref 4.14–5.8)
SODIUM SERPL-SCNC: 137 MMOL/L (ref 134–144)
T3RU NFR SERPL: 27 % (ref 24–39)
T4 SERPL-MCNC: 7.4 UG/DL (ref 4.5–12)
TIBC SERPL-MCNC: 291 UG/DL (ref 250–450)
TRIGL SERPL-MCNC: 46 MG/DL (ref 0–149)
TSH SERPL DL<=0.005 MIU/L-ACNC: 2.79 UIU/ML (ref 0.45–4.5)
UIBC SERPL-MCNC: 161 UG/DL (ref 111–343)
VIT B12 SERPL-MCNC: >2000 PG/ML (ref 232–1245)
VLDLC SERPL CALC-MCNC: 11 MG/DL (ref 5–40)
WBC # BLD AUTO: 4.5 X10E3/UL (ref 3.4–10.8)

## 2022-09-27 NOTE — PROGRESS NOTES
Your blood work looks good with the exception that your blood sugars are elevated and you are considered prediabetic.  Please keep an eye on your sweet tooth over the next year and we will recheck it at your next appointment.

## 2022-12-14 ENCOUNTER — LAB REQUISITION (OUTPATIENT)
Dept: LAB | Facility: HOSPITAL | Age: 82
End: 2022-12-14

## 2022-12-14 ENCOUNTER — OFFICE VISIT (OUTPATIENT)
Dept: WOUND CARE | Facility: HOSPITAL | Age: 82
End: 2022-12-14

## 2022-12-14 DIAGNOSIS — L97.812 NON-PRESSURE CHRONIC ULCER OF OTHER PART OF RIGHT LOWER LEG WITH FAT LAYER EXPOSED: ICD-10-CM

## 2022-12-14 PROCEDURE — 87015 SPECIMEN INFECT AGNT CONCNTJ: CPT | Performed by: NURSE PRACTITIONER

## 2022-12-14 PROCEDURE — 87205 SMEAR GRAM STAIN: CPT | Performed by: NURSE PRACTITIONER

## 2022-12-14 PROCEDURE — 87070 CULTURE OTHR SPECIMN AEROBIC: CPT | Performed by: NURSE PRACTITIONER

## 2022-12-14 PROCEDURE — G0463 HOSPITAL OUTPT CLINIC VISIT: HCPCS

## 2022-12-14 PROCEDURE — 87075 CULTR BACTERIA EXCEPT BLOOD: CPT | Performed by: NURSE PRACTITIONER

## 2022-12-17 LAB
BACTERIA SPEC AEROBE CULT: NORMAL
GRAM STN SPEC: NORMAL

## 2022-12-19 LAB — BACTERIA SPEC ANAEROBE CULT: NORMAL

## 2022-12-21 ENCOUNTER — OFFICE VISIT (OUTPATIENT)
Dept: WOUND CARE | Facility: HOSPITAL | Age: 82
End: 2022-12-21

## 2022-12-21 PROCEDURE — G0463 HOSPITAL OUTPT CLINIC VISIT: HCPCS

## 2022-12-29 DIAGNOSIS — I10 RESISTANT HYPERTENSION: ICD-10-CM

## 2022-12-29 RX ORDER — OLMESARTAN MEDOXOMIL 40 MG/1
TABLET ORAL
Qty: 90 TABLET | Refills: 3 | Status: SHIPPED | OUTPATIENT
Start: 2022-12-29

## 2023-02-16 ENCOUNTER — OFFICE VISIT (OUTPATIENT)
Dept: WOUND CARE | Facility: HOSPITAL | Age: 83
End: 2023-02-16
Payer: MEDICARE

## 2023-02-16 PROCEDURE — G0463 HOSPITAL OUTPT CLINIC VISIT: HCPCS

## 2023-02-21 ENCOUNTER — PATIENT MESSAGE (OUTPATIENT)
Dept: INTERNAL MEDICINE | Facility: CLINIC | Age: 83
End: 2023-02-21
Payer: MEDICARE

## 2023-02-21 DIAGNOSIS — R26.9 GAIT ABNORMALITY: ICD-10-CM

## 2023-02-21 DIAGNOSIS — R26.89 BALANCE DISORDER: Primary | ICD-10-CM

## 2023-02-21 DIAGNOSIS — R53.81 PHYSICAL DECONDITIONING: ICD-10-CM

## 2023-02-23 ENCOUNTER — OFFICE VISIT (OUTPATIENT)
Dept: WOUND CARE | Facility: HOSPITAL | Age: 83
End: 2023-02-23
Payer: MEDICARE

## 2023-03-02 ENCOUNTER — OFFICE VISIT (OUTPATIENT)
Dept: WOUND CARE | Facility: HOSPITAL | Age: 83
End: 2023-03-02
Payer: MEDICARE

## 2023-03-09 ENCOUNTER — OFFICE VISIT (OUTPATIENT)
Dept: WOUND CARE | Facility: HOSPITAL | Age: 83
End: 2023-03-09
Payer: MEDICARE

## 2023-03-20 ENCOUNTER — OFFICE VISIT (OUTPATIENT)
Dept: WOUND CARE | Facility: HOSPITAL | Age: 83
End: 2023-03-20
Payer: MEDICARE

## 2023-03-21 ENCOUNTER — OFFICE VISIT (OUTPATIENT)
Dept: INTERNAL MEDICINE | Facility: CLINIC | Age: 83
End: 2023-03-21
Payer: MEDICARE

## 2023-03-21 VITALS
HEART RATE: 119 BPM | OXYGEN SATURATION: 98 % | HEIGHT: 70 IN | WEIGHT: 183.6 LBS | BODY MASS INDEX: 26.28 KG/M2 | TEMPERATURE: 96.8 F | DIASTOLIC BLOOD PRESSURE: 88 MMHG | SYSTOLIC BLOOD PRESSURE: 142 MMHG

## 2023-03-21 DIAGNOSIS — I50.30 DIASTOLIC CONGESTIVE HEART FAILURE, UNSPECIFIED HF CHRONICITY: Primary | ICD-10-CM

## 2023-03-21 RX ORDER — FUROSEMIDE 20 MG/1
TABLET ORAL
Qty: 90 TABLET | Refills: 3 | Status: SHIPPED | OUTPATIENT
Start: 2023-03-21

## 2023-03-21 RX ORDER — POTASSIUM CHLORIDE 750 MG/1
10 TABLET, FILM COATED, EXTENDED RELEASE ORAL DAILY
Qty: 90 TABLET | Refills: 3 | Status: SHIPPED | OUTPATIENT
Start: 2023-03-21

## 2023-03-21 NOTE — PROGRESS NOTES
Date of Encounter: 2023  Patient: Gabo Boo,  1940    Subjective   History of Presenting Illness  Chief complaint: ER follow-up    Patient went to the ED on 3/19/2023 for elevated blood pressure and shortness of breath.  He had not taken his blood pressure medicine for 2 days.  He underwent evaluation which included blood work, CT of the chest, EKG, troponins, main findings include increased bibasilar pleural effusions when compared to CT of the chest from 2022.  Over the past few months he has noticed worsening shortness of breath with exertion.  No chest pain.  No dizziness.  He is not currently on a diuretic.  He denies cough and fever.  There have been no other recent medication changes.  His heart rate at rest today is in the 70s.    The following portions of the patient's history were reviewed and updated as appropriate: allergies, current medications, past family history, past medical history, past social history, past surgical history and problem list.    Patient Active Problem List   Diagnosis   • Anxiety   • Hyperlipidemia   • Resistant hypertension   • Insomnia   • Prediabetes   • Fatigue   • Focal epilepsy (HCC)   • Hygroma   • Seizure (HCC)   • Partial seizures (HCC)   • Vitamin D deficiency   • Bilateral lower extremity edema   • H/O lead exposure   • Paroxysmal atrial fibrillation (HCC)   • Hx of adenomatous polyp of colon   • Presence of Watchman left atrial appendage closure device   • Ascending aortic aneurysm     Past Medical History:   Diagnosis Date   • Abnormal barium swallow 2016    HH, reflux   • AF (paroxysmal atrial fibrillation) (HCC)    • Anemia 3/21/2021   • Anxiety    • GERD (gastroesophageal reflux disease)    • H/O cataract     Dr Heron Hoffman   • Hernia, epigastric 10/14/2020    Added automatically from request for surgery [id number]   • Hiatal hernia    • Hyperlipidemia    • Hypertension    • Internal hemorrhoids    • Mood disorder (HCC) 2017   •  PONV (postoperative nausea and vomiting)    • Seizures (HCC)     July 2013 was last seizure   • Tubular adenoma of colon    • Umbilical hernia without obstruction and without gangrene 10/14/2020    Added automatically from request for surgery [id number]     Past Surgical History:   Procedure Laterality Date   • CATARACT EXTRACTION Bilateral    • COLONOSCOPY  04/23/2013    Dr Galeano/[facility], [facility], tubular adenoma w/low grade dysplasia   • COLONOSCOPY N/A 11/3/2020    Procedure: COLONOSCOPY;  Surgeon: Gabo Power MD;  Location: [facility];  Service: General;  Laterality: N/A;   • ENDOSCOPY N/A 12/16/2016    Procedure: ESOPHAGOGASTRODUODENOSCOPY WITH COLD BIOPSIES AND 54 FR KENNY DILATATION;  Surgeon: Shiraz Galeano MD;  Location: [facility];  Service:    • INGUINAL HERNIA REPAIR      as a child and again in 1970 approx.   • KRISTYN Left 03/2019    Left Atrial Appendage Occlusion W KRISTYN   • TONSILLECTOMY     • VENTRAL/INCISIONAL HERNIA REPAIR N/A 11/3/2020    Procedure: VENTRAL HERNIA REPAIR WITH MESH;  Surgeon: Gabo Power MD;  Location: [facility];  Service: General;  Laterality: N/A;   • WRIST SURGERY Right      Family History   Problem Relation Age of Onset   • Stroke Father    • COPD Father    • Thyroid disease Daughter    • Thyroid disease Daughter    • Malig Hyperthermia Neg Hx        Current Outpatient Medications:   •  Astaxanthin 4 MG capsule, Take 4 mg by mouth Daily., Disp: , Rfl:   •  B Complex Vitamins (VITAMIN B COMPLEX) capsule capsule, Take 1 capsule by mouth Daily., Disp: , Rfl:   •  BLACK CURRANT SEED OIL PO, Take 1 tablet by mouth Daily., Disp: , Rfl:   •  carvedilol (COREG) 6.25 MG tablet, Take 2 tablets by mouth 2 (Two) Times a Day., Disp: , Rfl:   •  clonazePAM (KlonoPIN) 0.5 MG tablet, TAKE 1 TABLET BY MOUTH TWICE A DAY AS NEEDED (Patient taking differently: Take 1 tablet by mouth 2 (Two) Times a Day As Needed for Anxiety.), Disp: 60 tablet, Rfl: 2  •  hydrALAZINE (APRESOLINE)  100 MG tablet, Take 0.5 tablets by mouth 3 (Three) Times a Day., Disp: , Rfl: 11  •  hydroCHLOROthiazide (HYDRODIURIL) 25 MG tablet, Take 1 tab PO QD for blood pressure, Disp: 90 tablet, Rfl: 3  •  lacosamide (Vimpat) 200 MG tablet, Take 1 tablet by mouth 2 (Two) Times a Day., Disp: , Rfl:   •  Melatonin 3 MG tablet dispersible, Place  on the tongue., Disp: , Rfl:   •  Misc Natural Products (Cortisol Manager) tablet, Take 1 tablet by mouth Daily., Disp: , Rfl:   •  Multiple Vitamin (MULTIVITAMIN PO), Take 1 tablet by mouth Daily., Disp: , Rfl:   •  Nutritional Supplements (PYCNOGENOL PO), Take 100 mg by mouth Every Morning., Disp: , Rfl:   •  olmesartan (BENICAR) 40 MG tablet, TAKE 1 TABLET BY MOUTH EVERY DAY, Disp: 90 tablet, Rfl: 3  •  Omega-3 Fatty Acids (OMEGA-3 FISH OIL PO), Take 1 capsule by mouth Daily., Disp: , Rfl:   •  Quercetin 250 MG tablet, Take  by mouth., Disp: , Rfl:   •  QUEtiapine fumarate ER (SEROquel XR) 50 MG tablet sustained-release 24 hour tablet, Take 1 tablet by mouth At Night As Needed., Disp: , Rfl:   •  Trace Min CaCrCuFeKMgMnPSeZn (MINERALS PO), Take 1 tablet by mouth Daily., Disp: , Rfl:   •  Ubiquinol 100 MG capsule, Take 100 mg by mouth Daily., Disp: , Rfl:   •  Unable to find, Take 1 each by mouth Daily. Liposomal C 1000 mg,, Disp: , Rfl:   •  Unable to find, Take 1 each by mouth Daily. Ageless Brain by [employer], HOLD ALL SUPPLEMENTS FOR SURGERY, Disp: , Rfl:   •  vitamin B-6 (PYRIDOXINE) 100 MG tablet, Take 1 tablet by mouth Daily. HOLD ALL SUPPLEMENTS FOR SURGERY, Disp: , Rfl:   •  Vitamin D-Vitamin K (D3 + K2 Dots) 1000-90 UNIT-MCG tablet, Take 1 tablet/day by mouth Daily. HOLD ALL SUPPLEMENTS FOR SURGERY, Disp: , Rfl:   •  furosemide (Lasix) 20 MG tablet, Take 1 tablet daily for heart failure, Disp: 90 tablet, Rfl: 3  •  potassium chloride 10 MEQ CR tablet, Take 1 tablet by mouth Daily., Disp: 90 tablet, Rfl: 3  No Known Allergies  Social History     Tobacco Use   • Smoking  "status: Never   • Smokeless tobacco: Never   Vaping Use   • Vaping Use: Never used   Substance Use Topics   • Alcohol use: Yes     Alcohol/week: 3.0 standard drinks     Types: 3 Glasses of wine per week     Comment: daily   • Drug use: Never          Objective   Physical Exam  Vitals:    03/21/23 1118   BP: 142/88   BP Location: Left arm   Patient Position: Sitting   Cuff Size: Adult   Pulse: 119   Temp: 96.8 °F (36 °C)   TempSrc: Infrared   SpO2: 98%   Weight: 83.3 kg (183 lb 9.6 oz)   Height: 177.8 cm (70\")     Body mass index is 26.34 kg/m².    Constitutional: NAD.  Cardiovascular: Irregularly irregular without RVR. No murmurs.  1+ pitting edema of the right lower extremity, mild nonpitting edema of the left lower extremity.  JVD to 3 cm when upright.    Pulmonary: Diminished breath sounds at both bases.  Good effort.  Integumentary: No rashes or wounds on face or upper extremities.  Psychiatric: Normal affect. Normal thought content.     Assessment & Plan   Assessment and Plan  Pleasant 82-year-old male with seizure disorder, paroxysmal atrial fibrillation with watchman device, ascending aortic aneurysm, resistant hypertension, hyperlipidemia, history of prediabetes, vitamin D deficiency, insomnia, who presents for the following:    Diagnoses and all orders for this visit:    1. Diastolic congestive heart failure, unspecified HF chronicity (HCC) (Primary)  -     furosemide (Lasix) 20 MG tablet; Take 1 tablet daily for heart failure  Dispense: 90 tablet; Refill: 3  -     potassium chloride 10 MEQ CR tablet; Take 1 tablet by mouth Daily.  Dispense: 90 tablet; Refill: 3  -     Adult Transthoracic Echo Complete W/ Cont if Necessary Per Protocol    Based on his ER examination, and his history, I suspect this is new onset subacute to chronic heart failure probably with preserved ejection fraction finally manifesting symptomatically since he is attempting more regular exertion.  The pleural effusions have increased " over the past 9 months on CT.    I would like to repeat his echocardiogram and start him on low-dose furosemide with potassium.  I discussed the risks and benefits of this medication.  I discussed reasons to return for further follow-up.  Okay to keep routine cardiology follow-up for now although that may change depending on his echocardiogram results.    Tomas Lopez MD  Family Medicine  O: [phone]    Disclaimer: Parts of this note were dictated by speech recognition. Minor errors in transcription may be present. Please call if questions.

## 2023-03-27 ENCOUNTER — OFFICE VISIT (OUTPATIENT)
Dept: WOUND CARE | Facility: HOSPITAL | Age: 83
End: 2023-03-27
Payer: MEDICARE

## 2023-03-30 ENCOUNTER — HOSPITAL ENCOUNTER (OUTPATIENT)
Dept: CARDIOLOGY | Facility: HOSPITAL | Age: 83
Discharge: HOME | End: 2023-03-30
Admitting: FAMILY MEDICINE
Payer: MEDICARE

## 2023-03-30 VITALS
SYSTOLIC BLOOD PRESSURE: 153 MMHG | HEIGHT: 70 IN | WEIGHT: 182.98 LBS | HEART RATE: 74 BPM | BODY MASS INDEX: 26.2 KG/M2 | DIASTOLIC BLOOD PRESSURE: 107 MMHG

## 2023-03-30 PROBLEM — I50.32 CHRONIC HEART FAILURE WITH PRESERVED EJECTION FRACTION: Status: ACTIVE | Noted: 2023-03-30

## 2023-03-30 PROBLEM — I07.1 TRICUSPID REGURGITATION: Status: ACTIVE | Noted: 2023-03-30

## 2023-03-30 LAB
AORTIC DIMENSIONLESS INDEX: 0.6 (DI)
ASCENDING AORTA: 4.4 CM
BH CV ECHO MEAS - ACS: 1.59 CM
BH CV ECHO MEAS - AI P1/2T: 423.8 MSEC
BH CV ECHO MEAS - AO MAX PG: 7.2 MMHG
BH CV ECHO MEAS - AO MEAN PG: 4.1 MMHG
BH CV ECHO MEAS - AO ROOT DIAM: 3.2 CM
BH CV ECHO MEAS - AO V2 MAX: 134.2 CM/SEC
BH CV ECHO MEAS - AO V2 VTI: 23.5 CM
BH CV ECHO MEAS - AVA(I,D): 2.13 CM2
BH CV ECHO MEAS - EDV(CUBED): 91 ML
BH CV ECHO MEAS - EDV(MOD-SP2): 126 ML
BH CV ECHO MEAS - EDV(MOD-SP4): 120 ML
BH CV ECHO MEAS - EF(MOD-BP): 47.1 %
BH CV ECHO MEAS - EF(MOD-SP2): 45.2 %
BH CV ECHO MEAS - EF(MOD-SP4): 46.7 %
BH CV ECHO MEAS - ESV(CUBED): 56.5 ML
BH CV ECHO MEAS - ESV(MOD-SP2): 69 ML
BH CV ECHO MEAS - ESV(MOD-SP4): 64 ML
BH CV ECHO MEAS - FS: 14.7 %
BH CV ECHO MEAS - IVS/LVPW: 0.96 CM
BH CV ECHO MEAS - IVSD: 0.99 CM
BH CV ECHO MEAS - LAT PEAK E' VEL: 14 CM/SEC
BH CV ECHO MEAS - LV MASS(C)D: 154.4 GRAMS
BH CV ECHO MEAS - LV MAX PG: 2.33 MMHG
BH CV ECHO MEAS - LV MEAN PG: 1.11 MMHG
BH CV ECHO MEAS - LV V1 MAX: 76.3 CM/SEC
BH CV ECHO MEAS - LV V1 VTI: 13.9 CM
BH CV ECHO MEAS - LVIDD: 4.5 CM
BH CV ECHO MEAS - LVIDS: 3.8 CM
BH CV ECHO MEAS - LVOT AREA: 3.6 CM2
BH CV ECHO MEAS - LVOT DIAM: 2.14 CM
BH CV ECHO MEAS - LVPWD: 1.02 CM
BH CV ECHO MEAS - MED PEAK E' VEL: 9.7 CM/SEC
BH CV ECHO MEAS - MV DEC SLOPE: 482.7 CM/SEC2
BH CV ECHO MEAS - MV DEC TIME: 0.15 MSEC
BH CV ECHO MEAS - MV E MAX VEL: 96.4 CM/SEC
BH CV ECHO MEAS - MV MAX PG: 5.1 MMHG
BH CV ECHO MEAS - MV MEAN PG: 2.05 MMHG
BH CV ECHO MEAS - MV P1/2T: 65.1 MSEC
BH CV ECHO MEAS - MV V2 VTI: 13.9 CM
BH CV ECHO MEAS - MVA(P1/2T): 3.4 CM2
BH CV ECHO MEAS - MVA(VTI): 3.6 CM2
BH CV ECHO MEAS - PA ACC TIME: 0.06 SEC
BH CV ECHO MEAS - PA PR(ACCEL): 50.5 MMHG
BH CV ECHO MEAS - PA V2 MAX: 69.2 CM/SEC
BH CV ECHO MEAS - PULM DIAS VEL: 28 CM/SEC
BH CV ECHO MEAS - PULM S/D: 0.75
BH CV ECHO MEAS - PULM SYS VEL: 21 CM/SEC
BH CV ECHO MEAS - QP/QS: 0.76
BH CV ECHO MEAS - RAP SYSTOLE: 3 MMHG
BH CV ECHO MEAS - RV MAX PG: 1.09 MMHG
BH CV ECHO MEAS - RV V1 MAX: 52.3 CM/SEC
BH CV ECHO MEAS - RV V1 VTI: 11.6 CM
BH CV ECHO MEAS - RVOT DIAM: 2.03 CM
BH CV ECHO MEAS - RVSP: 36 MMHG
BH CV ECHO MEAS - SV(LVOT): 50 ML
BH CV ECHO MEAS - SV(MOD-SP2): 57 ML
BH CV ECHO MEAS - SV(MOD-SP4): 56 ML
BH CV ECHO MEAS - SV(RVOT): 37.8 ML
BH CV ECHO MEAS - TAPSE (>1.6): 1.17 CM
BH CV ECHO MEAS - TR MAX PG: 32.8 MMHG
BH CV ECHO MEAS - TR MAX VEL: 286.6 CM/SEC
BH CV ECHO MEASUREMENTS AVERAGE E/E' RATIO: 8.14
BH CV XLRA - RV BASE: 3.1 CM
BH CV XLRA - RV LENGTH: 8.2 CM
BH CV XLRA - RV MID: 2.8 CM
BH CV XLRA - TDI S': 12.1 CM/SEC
LEFT ATRIUM VOLUME INDEX: 38.7 ML/M2
MAXIMAL PREDICTED HEART RATE: 140 BPM
SINUS: 3.8 CM
STJ: 3.2 CM
STRESS TARGET HR: 119 BPM

## 2023-03-30 PROCEDURE — 25510000001 PERFLUTREN (DEFINITY) 8.476 MG IN SODIUM CHLORIDE (PF) 0.9 % 10 ML INJECTION: Performed by: FAMILY MEDICINE

## 2023-03-30 PROCEDURE — 93306 TTE W/DOPPLER COMPLETE: CPT

## 2023-03-30 RX ADMIN — SODIUM CHLORIDE 4 ML: 9 INJECTION INTRAMUSCULAR; INTRAVENOUS; SUBCUTANEOUS at 11:15

## 2023-03-31 ENCOUNTER — NURSE TRIAGE (OUTPATIENT)
Dept: CALL CENTER | Facility: HOSPITAL | Age: 83
End: 2023-03-31
Payer: MEDICARE

## 2023-03-31 ENCOUNTER — TELEPHONE (OUTPATIENT)
Dept: INTERNAL MEDICINE | Facility: CLINIC | Age: 83
End: 2023-03-31
Payer: MEDICARE

## 2023-03-31 ENCOUNTER — TELEPHONE (OUTPATIENT)
Dept: FAMILY MEDICINE CLINIC | Facility: CLINIC | Age: 83
End: 2023-03-31
Payer: MEDICARE

## 2023-03-31 NOTE — TELEPHONE ENCOUNTER
Pt's wife calling today stating that pt is having low BP currently. His wife states that it is currently 97/68 and that she wants to know what they need to do. Pt states he feels fine and is not having trouble walking at all. PT wife states that recently he was put on Lasix and potassium and that his BP has always been high.     Should pt skip any upcoming doses of any of medication?    Please advise

## 2023-03-31 NOTE — TELEPHONE ENCOUNTER
Caller is calling concerned about her 's blood pressure. This afternoon his BP was 97/68 and he waited 30 mins to re-check and it was normal then. He most recently checked his BP and it was 136/101 and his wife is concerned that the systolic and diastolic numbers are too close.   Reviewed protocol with the caller and the patient. Advised him to follow up with PCP within 2 weeks. Also advised him to keep track of his BP measurements, to take medications as prescribed and to call back if he develops any concerning symptoms.   The patient and his wife both agree.     Reason for Disposition  • [1] Systolic BP  >= 130 OR Diastolic >= 80 AND [2] taking BP medications    Additional Information  • Negative: Difficult to awaken or acting confused (e.g., disoriented, slurred speech)  • Negative: SEVERE difficulty breathing (e.g., struggling for each breath, speaks in single words)  • Negative: [1] Weakness of the face, arm or leg on one side of the body AND [2] new-onset  • Negative: [1] Numbness (i.e., loss of sensation) of the face, arm or leg on one side of the body AND [2] new-onset  • Negative: [1] Chest pain lasts > 5 minutes AND [2] history of heart disease  (i.e., heart attack, bypass surgery, angina, angioplasty, CHF)  • Negative: [1] Chest pain AND [2] took nitrogylcerin AND [3] pain was not relieved  • Negative: Sounds like a life-threatening emergency to the triager  • Negative: Symptom is main concern  (e.g., headache, chest pain)  • Negative: Low blood pressure is main concern  • Negative: [1] Systolic BP  >= 160 OR Diastolic >= 100 AND [2] cardiac or neurologic symptoms (e.g., chest pain, difficulty breathing, unsteady gait, blurred vision)  • Negative: [1] Pregnant 20 or more weeks (or postpartum < 6 weeks) AND [2] new hand or face swelling  • Negative: [1] Pregnant 20 or more weeks AND [2] Systolic BP  >= 140 OR Diastolic >= 90  • Negative: [1] Systolic BP  >= 200 OR Diastolic >= 120  AND [2] having NO  "cardiac or neurologic symptoms  • Negative: [1] Postpartum < 6 weeks AND [2] Systolic BP  >= 140 OR Diastolic >= 90  • Negative: [1] Systolic BP  >= 180 OR Diastolic >= 110 AND [2] missed most recent dose of blood pressure medication  • Negative: Systolic BP  >= 180 OR Diastolic >= 110  • Negative: Ran out of BP medications  • Negative: Systolic BP  >= 160 OR Diastolic >= 100  • Negative: [1] Taking BP medications AND [2] feels is having side effects (e.g., impotence, cough, dizzy upon standing)  • Negative: [1] Systolic BP  >= 130 OR Diastolic >= 80 AND [2] pregnant  • Negative: [1] Systolic BP  >= 130 OR Diastolic >= 80 AND [2] not taking BP medications  • Negative: [1] Systolic BP  >= 130 OR Diastolic >= 80 AND [2] history of heart problems, kidney disease or diabetes  • Negative: Wants doctor to measure BP  • Negative: [1] Systolic BP  < 130 with Diastolic < 80 AND [2] taking BP medications    Answer Assessment - Initial Assessment Questions  1. BLOOD PRESSURE: \"What is the blood pressure?\" \"Did you take at least two measurements 5 minutes apart?\"      136/101 and 161/106  2. ONSET: \"When did you take your blood pressure?\"      The first BP was low 2 hours ago at 97/69 and now it is 136/101  3. HOW: \"How did you obtain the blood pressure?\" (e.g., visiting nurse, automatic home BP monitor)      Automatic arm cuff  4. HISTORY: \"Do you have a history of high blood pressure?\"      Yes, he is on 4 BP meds and was recently in the hospital for HTN. They recently added furosemide to his regimen.  5. MEDICATIONS: \"Are you taking any medications for blood pressure?\" \"Have you missed any doses recently?\"      He took his furosemide at 16 hours instead of 24 hours  6. OTHER SYMPTOMS: \"Do you have any symptoms?\" (e.g., headache, chest pain, blurred vision, difficulty breathing, weakness)      none  7. PREGNANCY: \"Is there any chance you are pregnant?\" \"When was your last menstrual period?\"      NA    Protocols used: BLOOD " PRESSURE - HIGH-ADULT-AH

## 2023-03-31 NOTE — TELEPHONE ENCOUNTER
Spoke to pt/ pts wife and stated that Provider INA Pereira recommends to go to the ER as soon as possible. Pt was advised and is understanding and states he will go.

## 2023-03-31 NOTE — PROGRESS NOTES
Your heart pumping function is mildly reduced, and you have problems with your tricuspid valve that may be causing more fluid to be backed up in your lungs. I am hopeful that the medication changes have helped your symptoms. If not, I do recommend following up with your cardiologist to discuss this further.

## 2023-04-03 ENCOUNTER — OFFICE VISIT (OUTPATIENT)
Dept: WOUND CARE | Facility: HOSPITAL | Age: 83
End: 2023-04-03
Payer: MEDICARE

## 2023-04-03 PROCEDURE — G0463 HOSPITAL OUTPT CLINIC VISIT: HCPCS

## 2023-05-01 ENCOUNTER — OFFICE VISIT (OUTPATIENT)
Dept: INTERNAL MEDICINE | Facility: CLINIC | Age: 83
End: 2023-05-01
Payer: MEDICARE

## 2023-05-01 VITALS
OXYGEN SATURATION: 98 % | HEART RATE: 76 BPM | TEMPERATURE: 97.5 F | HEIGHT: 70 IN | DIASTOLIC BLOOD PRESSURE: 62 MMHG | WEIGHT: 175 LBS | BODY MASS INDEX: 25.05 KG/M2 | SYSTOLIC BLOOD PRESSURE: 128 MMHG

## 2023-05-01 DIAGNOSIS — R53.82 CHRONIC FATIGUE: ICD-10-CM

## 2023-05-01 DIAGNOSIS — R06.83 SNORING: ICD-10-CM

## 2023-05-01 DIAGNOSIS — I50.30 DIASTOLIC CONGESTIVE HEART FAILURE, UNSPECIFIED HF CHRONICITY: Primary | ICD-10-CM

## 2023-05-01 DIAGNOSIS — I48.91 ATRIAL FIBRILLATION WITH RVR: ICD-10-CM

## 2023-05-01 DIAGNOSIS — R26.89 BALANCE DISORDER: ICD-10-CM

## 2023-05-01 PROBLEM — R79.89 ELEVATED D-DIMER: Status: ACTIVE | Noted: 2023-04-22

## 2023-05-01 PROBLEM — J90 BILATERAL PLEURAL EFFUSION: Status: ACTIVE | Noted: 2023-04-22

## 2023-05-01 PROBLEM — R06.02 SHORTNESS OF BREATH: Status: ACTIVE | Noted: 2023-04-22

## 2023-05-01 PROBLEM — I16.0 HYPERTENSIVE URGENCY: Status: ACTIVE | Noted: 2023-04-22

## 2023-05-01 RX ORDER — POTASSIUM CHLORIDE 1500 MG/1
1 TABLET, EXTENDED RELEASE ORAL DAILY
COMMUNITY
Start: 2023-04-24

## 2023-05-01 RX ORDER — BUMETANIDE 1 MG/1
1 TABLET ORAL DAILY
COMMUNITY
Start: 2023-04-24

## 2023-05-01 RX ORDER — METOPROLOL SUCCINATE 50 MG/1
1 TABLET, EXTENDED RELEASE ORAL DAILY
COMMUNITY
Start: 2023-04-28

## 2023-05-01 RX ORDER — CARVEDILOL 12.5 MG/1
12.5 TABLET ORAL 2 TIMES DAILY WITH MEALS
COMMUNITY
Start: 2023-04-24 | End: 2023-05-01 | Stop reason: ALTCHOICE

## 2023-05-01 NOTE — PROGRESS NOTES
"Chief Complaint  Hospital Follow Up Visit, Congestive Heart Failure, and Atrial Fibrillation    Subjective        Gabo Boo presents to [facility]  History of Present Illness  Patient presents today with his wife for hospital follow-up, diagnosis of congestive heart failure with atrial fibrillation.    Followed up with his cardiologist, Dr. Montez Matute on 2023, after he was released from [facility] on .  [facility]  with [facility] of shortness of breath and was found to have atrial fibrillation with a rapid ventricular response and decompensated diastolic congestive heart failure.  He was given IV diuretics and subsequently transition to oral Bumex.  When he presented to Dr. Matute's office, he did not feel very well.  However he did deny chest pain or significant shortness of breath.  Still feeling significantly fatigued and lethargic.    Per his wife, blood pressures are improved at home, 130's to 118's, with Toprol XL Taking the Bumex.     He is having balance issues, going to [facility] once per week. His wife is concerned about his stopping breathing, and wishes to have a sleep study.       Objective   Vital Signs:  /62 (BP Location: Left arm, Patient Position: Sitting, Cuff Size: Adult)   Pulse 76   Temp 97.5 °F (36.4 °C) (Infrared)   Ht 177.8 cm (70\")   Wt 79.4 kg (175 lb)   SpO2 98%   BMI 25.11 kg/m²   Estimated body mass index is 25.11 kg/m² as calculated from the following:    Height as of this encounter: 177.8 cm (70\").    Weight as of this encounter: 79.4 kg (175 lb).             Physical Exam  Constitutional:       Appearance: He is ill-appearing.      Comments: Frail   Pulmonary:      Breath sounds: Decreased air movement present. Decreased breath sounds present. No wheezing.   Musculoskeletal:      Right lower le+ Edema present.      Left lower le+ Edema present. "   Neurological:      General: No focal deficit present.      Mental Status: He is alert and oriented to person, place, and time.      Motor: Weakness present.      Coordination: Coordination abnormal.      Gait: Gait abnormal.   Psychiatric:         Mood and Affect: Mood normal.         Behavior: Behavior normal.         Thought Content: Thought content normal.         Judgment: Judgment normal.        Result Review :                   Assessment and Plan   Pleasant 82-year-old male with complicated medical history including left atrial appendage occlusion device implant March 13, 2019, compensated congestive heart failure, atrial fibrillation, status post Watchman procedure.   Dr. Matute discontinued the patient's Coreg and change him to Toprol XL.  That should lower his blood pressure improve heart rate control    Diagnoses and all orders for this visit:    1. Diastolic congestive heart failure, unspecified HF chronicity (Primary)  Comments:  Wear compression hose while at rest with legs elevated.     2. Atrial fibrillation with RVR    3. Snoring  Comments:  Suggest in-home sleep study test per patient request.   Orders:  -     Ambulatory Referral to Sleep Medicine    4. Chronic fatigue    5. Balance disorder  Comments:  Continue working with physical therapy. Consider using a rolling walker.     Continue to follow Dr. Montez Matute, cardiology as directed. Continue to take Toprol XL and or Bumex once daily as directed by Dr Montez Matute.          Follow Up   Return in 1 week (on 5/8/2023) for Recheck with Dr. Tomas Lopez .Please keep your scheduled appointment with Dr. Tomas Lopez for 09/25/2023. If you experience recurrent episodes of shortness of air, chest pain, extreme fatigue, call 911 and proceed to the ED.   Patient was given instructions and counseling regarding his condition or for health maintenance advice. Please see specific information pulled into the AVS if appropriate.

## 2023-05-02 ENCOUNTER — TELEPHONE (OUTPATIENT)
Dept: INTERNAL MEDICINE | Facility: CLINIC | Age: 83
End: 2023-05-02
Payer: MEDICARE

## 2023-05-02 NOTE — TELEPHONE ENCOUNTER
Caller: Travis Boo    Relationship: Emergency Contact    Best call back number: [phone]    What orders are you requesting (i.e. lab or imaging): WALKER    In what timeframe would the patient need to come in: ASAP    Where will you receive your lab/imaging services: [facility]    Additional notes: PATIENT'S WIFE STATES [facility] DID SAY HE WOULD BENEFIT FROM A WALKER AND SHE WAS LOOKING ON [facility] BUT WILL GET ONE FROM WHEREVER DR. GONCALVES SUGGESTS. MEDICARE WILL PAY FOR AN ASSISTIVE DEVICE EVERY 5 YEARS SO SHOULD BE ABLE TO GET IT COVERED WITH INSURANCE.    PLEASE ADVISE

## 2023-05-15 ENCOUNTER — TELEPHONE (OUTPATIENT)
Dept: INTERNAL MEDICINE | Facility: CLINIC | Age: 83
End: 2023-05-15
Payer: MEDICARE

## 2023-05-15 DIAGNOSIS — Z91.89 RISK FACTORS FOR OBSTRUCTIVE SLEEP APNEA: ICD-10-CM

## 2023-05-15 DIAGNOSIS — I48.91 ATRIAL FIBRILLATION WITH RVR: Primary | ICD-10-CM

## 2023-05-15 RX ORDER — QUETIAPINE FUMARATE 50 MG/1
50 TABLET, EXTENDED RELEASE ORAL NIGHTLY PRN
Qty: 90 EACH | Status: CANCELLED | OUTPATIENT
Start: 2023-05-15

## 2023-05-15 NOTE — TELEPHONE ENCOUNTER
"Caller: Travis Boo    Relationship: Emergency Contact    Best call back number:     What was the call regarding: PATIENT WAS IN THE [facility], WHERE HE WENT OFF OF   QUEtiapine fumarate ER (SEROquel XR) 50 MG tablet sustained-release 24 hour tablet  AND WAS DOING \"JUST FINE.\"  [facility]    DESPITE TAKING HIM OFF OF THE MEDICATION, THE HOSPITAL INCLUDED QUETIAPINE IN HIS MEDICATION LIST FOR [facility]. THE PATIENT BEGAN SLURRING HIS WORDS WHEN RETURNED ONTO THE MEDICATION.    PATIENT'S WIFE REQUESTED THAT THE PATIENT BE TAKEN BACK OFF THE MEDICATION, BUT THE REHAB CENTER WAS  UNHELPFUL. PATIENT'S WIFE WOULD LIKE FOR THE PATIENT TO BE OFF OF QUETIAPINE QUICKLY.    SHE IS REQUESTING THAT DR. GONCALVES CALL [facility] AND RECOMMEND THE PATIENT BE TAKEN OFF OF QUETIAPINE.    [facility] PHONE NUMBER  [phone]    Do you require a callback: YES, PLEASE CALL MRS. BOO WITH ANY UPDATES.  "

## 2023-05-16 NOTE — TELEPHONE ENCOUNTER
I cant manage his healthcare while he is admitted to a rehab facility - needs to discuss with doctors or nurse practitioners there

## 2023-05-25 ENCOUNTER — TELEPHONE (OUTPATIENT)
Dept: INTERNAL MEDICINE | Facility: CLINIC | Age: 83
End: 2023-05-25
Payer: MEDICARE

## 2023-05-25 NOTE — TELEPHONE ENCOUNTER
LVM for pt and wife with providers message, advised to call back with if they still need the walker order.

## 2023-05-25 NOTE — TELEPHONE ENCOUNTER
Pratima with  [facility] will be taking over home care needs after patient is discharge this coming Friday from the hospital and they will start seeing patient on Tuesday 5/30/23 while Dr. Tomas Lopez will be their POC for pt to sign orders

## 2023-05-26 ENCOUNTER — TRANSCRIBE ORDERS (OUTPATIENT)
Dept: HOME HEALTH SERVICES | Facility: HOME HEALTHCARE | Age: 83
End: 2023-05-26
Payer: MEDICARE

## 2023-05-26 DIAGNOSIS — I10 HYPERTENSION, UNSPECIFIED TYPE: ICD-10-CM

## 2023-05-26 DIAGNOSIS — I48.91 ATRIAL FIBRILLATION, UNSPECIFIED TYPE: Primary | ICD-10-CM

## 2023-05-30 ENCOUNTER — HOME CARE VISIT (OUTPATIENT)
Dept: HOME HEALTH SERVICES | Facility: HOME HEALTHCARE | Age: 83
End: 2023-05-30
Payer: MEDICARE

## 2023-05-30 PROCEDURE — G0299 HHS/HOSPICE OF RN EA 15 MIN: HCPCS

## 2023-05-30 NOTE — Clinical Note
THIS IS TO INFORM YOU THAT FARHAD ARAUJO WAS ADMITTED TO OUR SERVICES AT [facility].  HE WILL BE WORKING WITH SN, PT AND OT.   FOR NEXT SEVERAL WEEKS  ALL MEDS IN HOME NO ISSUES   WITH REGARDS  VENUS FLORES RN

## 2023-05-31 ENCOUNTER — HOME CARE VISIT (OUTPATIENT)
Dept: HOME HEALTH SERVICES | Facility: HOME HEALTHCARE | Age: 83
End: 2023-05-31
Payer: MEDICARE

## 2023-05-31 VITALS
TEMPERATURE: 97.8 F | HEART RATE: 73 BPM | RESPIRATION RATE: 20 BRPM | OXYGEN SATURATION: 97 % | SYSTOLIC BLOOD PRESSURE: 132 MMHG | DIASTOLIC BLOOD PRESSURE: 68 MMHG

## 2023-05-31 VITALS
OXYGEN SATURATION: 96 % | DIASTOLIC BLOOD PRESSURE: 60 MMHG | TEMPERATURE: 97.4 F | HEART RATE: 80 BPM | SYSTOLIC BLOOD PRESSURE: 118 MMHG

## 2023-05-31 PROCEDURE — G0151 HHCP-SERV OF PT,EA 15 MIN: HCPCS

## 2023-05-31 NOTE — HOME HEALTH
SOC : 5/30    Home Health ordered for: disciplines : SN, PT AND OT     Reason for Hosp/Primary Dx/Co-morbidities: PATIENT WAS NOT ACTING RIGHT PER WIFE, THEY TOOK HIM TO HOSPITAL, DIAG WITH INFECTION BUT COULD NOT FIND SOURCE OF IT. HAS AFIB AND CHF., NEED EDUCATION ON BOTH. HE IS STILL VERY WEAK FROM HOSPITALIZATION AND REHAB STAY. UP WITH WALKER, FALLS SAFETY REVIEWED.   HE HAS NO C/O PAIN, IS STILL A LITTLE FUZZY ,BUT PASSED BIMS AND CAMS. WIFE IS MANAGING MEDS, REV WITH HER AND COMPARED TO D/C INSTRUCTIONS,   THERE ARE A LOT OF OTC MEDS HE IS TAKING NOT ON MAR, UPDATED LIST. HE WAS IN [place] AND REHAB CHANGED A LOT OF MEDS.     Focus of Care: TEACH ON DISEASE PROCESS FOR AFIB, CHF AND HOME MED MGMT AND HOME SAFETY .     Current Functional status/mobility/DME: UP WITH WALKER, ( WAITING ON [facility] TO DELIVER BSC.)     HB status/Living Arrangements: LIVES IN HOME WITH WIFE. SHE IS PRIMARY CG.    Skin Integrity/wound status: NO ISSUES WITH SKIN     Code Status: FULL    Fall Risk: YES    confirmed with: DR GONCALVES     Plan for next visit: TAKE EDUCATION BOOK ON CHF AND CONTINUE WITH EDUCATION ON DISEASE PROCESS AND HOME MED MGMT

## 2023-05-31 NOTE — HOME HEALTH
Patient with referral to home health following hospitalization due to bacterial pneumonia and atrial fibrillation exacerbation.  Patient's wife reports he had several days of weakness, a few falls and began to use a rolling walker at home so he was taken to the hospital.  Patient had been hospitalized in recent months due to atrial fibrillation as well.  Prior to this episode, patient reports he was driving, independent with ambulation and all activities of daily living.  He lives with his wife in a multi story home but has two steps to enter home with handrail only to access all needed areas of home.  Patient was actively walking 2 to 3 miles per day prior to this illness and hopes to return to his walking program.  He questions therapist about when he can drive.  No falls prior to recent episode and he denies any form of pain.    18.8 seconds without use of arms  Tinetti Assessment: 18/28    Past medical history: hypertension, hyperlipidemia, atrial fibrillation, depression, seizures,CHF    Plan for Next Visit:  -Continue to address appropriate use of assistive device with ambulation  -Encourage advance standing exercises for strength/conditioning  -Encourage clearance of feet with ambulation

## 2023-06-01 ENCOUNTER — HOME CARE VISIT (OUTPATIENT)
Dept: HOME HEALTH SERVICES | Facility: HOME HEALTHCARE | Age: 83
End: 2023-06-01
Payer: MEDICARE

## 2023-06-01 VITALS
HEART RATE: 70 BPM | SYSTOLIC BLOOD PRESSURE: 130 MMHG | RESPIRATION RATE: 18 BRPM | DIASTOLIC BLOOD PRESSURE: 84 MMHG | OXYGEN SATURATION: 99 %

## 2023-06-01 VITALS
OXYGEN SATURATION: 99 % | HEART RATE: 74 BPM | SYSTOLIC BLOOD PRESSURE: 154 MMHG | DIASTOLIC BLOOD PRESSURE: 88 MMHG | TEMPERATURE: 97.6 F

## 2023-06-01 PROCEDURE — G0300 HHS/HOSPICE OF LPN EA 15 MIN: HCPCS

## 2023-06-01 PROCEDURE — G0152 HHCP-SERV OF OT,EA 15 MIN: HCPCS

## 2023-06-01 NOTE — Clinical Note
OT will follow 1w1, 2w1 for establishing HEP for carryover. He is safe in his performance of ADLs and using AD appropriately in the process.

## 2023-06-01 NOTE — HOME HEALTH
"CURRENT SITUATION: Recent hospitalization about 1 month ago due to A-Fib issues. Came home and was getting progressively weaker. Family took him back to hospital and he was diagnosed w/bacterial PNA & exacerbation of A-Fib. He was d/c'd to [facility] x2 wks and is now home w/HH SN, PT, OT.  PMHx: A-Fib, CHF, epilepsy, anemia, HLD, depression.    FOCUS OF CARE: strengthening for improving his post-hospital weakness, muscle mass loss, and improve safety w/ADLs.  SUBJECTIVE: wife; \"He has lost a lot of weight, muscles, not fat since being sick.\" Agreeable to OT evaluation.  SOCIAL & ENVIRONMENTAL SITUATION: Pt lives w/wife in multi-level home w/steps to enter. PLOF: Independent, very physically active w/walking 2-3 miles/day, driving.  PATIENT'S &/OR CAREGIVER'S GOAL: \"I really need to get back my strength.\"  INTERVENTIONS: OT Eval, ADL training, Home Safety, Therapeutic Exercise/Activity, Transfer/Mobility training, Monitor vitals including SPO2 via pulse-oximeter (notifiy MD if resting O2 <90% on room air), Patient/CG education, Falls risk prevention, Recommendations on AE, DME, AD, environmental adaptations.  ASSESSMENT, MEDICAL NECESSITY, PLAN: Patient requires skilled O.T. to teach appropriate HEP to help remediate deficits caused by his current condition/diagnosis in order to improve his strength which will optimize his safety with his ADLs, functional transfers and mobility, and activity tolerance. He will benefit from Pt/CG education on appropriate HEP building for carryover post-d/c and recommendations for DME safety and use. Pt/Wife are in agrrement to short term OT 1w1, 2w1.  PLAN FOR NEXT VISIT: teach BUE AROM HEP w/resistive band."

## 2023-06-02 ENCOUNTER — OFFICE VISIT (OUTPATIENT)
Dept: INTERNAL MEDICINE | Facility: CLINIC | Age: 83
End: 2023-06-02

## 2023-06-02 VITALS — BODY MASS INDEX: 22.53 KG/M2 | TEMPERATURE: 96.4 F | OXYGEN SATURATION: 100 % | WEIGHT: 157 LBS | HEART RATE: 76 BPM

## 2023-06-02 DIAGNOSIS — R94.5 ABNORMAL LIVER FUNCTION: ICD-10-CM

## 2023-06-02 DIAGNOSIS — G93.41 METABOLIC ENCEPHALOPATHY: ICD-10-CM

## 2023-06-02 DIAGNOSIS — Z09 HOSPITAL DISCHARGE FOLLOW-UP: Primary | ICD-10-CM

## 2023-06-02 DIAGNOSIS — R53.1 WEAKNESS: ICD-10-CM

## 2023-06-02 DIAGNOSIS — J90 PLEURAL EFFUSION, LEFT: ICD-10-CM

## 2023-06-02 DIAGNOSIS — I48.91 ATRIAL FIBRILLATION WITH RVR: ICD-10-CM

## 2023-06-02 DIAGNOSIS — D64.9 ANEMIA, UNSPECIFIED TYPE: ICD-10-CM

## 2023-06-02 NOTE — HOME HEALTH
Patient has a sleep study appointment on July 12  Patient is a CP assess- lives with wife and is new to O2.

## 2023-06-02 NOTE — PROGRESS NOTES
Date of Encounter: 2023  Patient: Gabo Boo,  1940    Subjective   History of Presenting Illness  Chief complaint: Hospital follow-up    Patient presents for hospital follow-up.    He was admitted to [facility]  - .  The stay was complicated by the EMR being down across the whole hospital so there is no discharge summary and several of his records are fragmented.  Subacute rehab tried to obtain discharge summaries and they were not successful, we have not been successful either. It appears that he presented for altered mental status, found to have A-fib and RVR with a chronic left pleural effusion with superimposed pneumonia, anemia, labile blood pressure, and abnormal liver function tests?  MRI of the brain showed chronic small vessel changes, old lacunar infarcts, general volume loss.  Chest x-ray concordant with bilateral pleural effusions left greater than right.  Admission BMP with mild hyponatremia, anemia, no hyperbilirubinemia or LFT elevation initially.  He was treated with antibiotics for presumed pneumonia, he was restarted on metoprolol for his atrial fibrillation with RVR, resumed on hydralazine as needed for SBP greater than 130, and continued on Bumex 1 mg.  For his cognitive impairment neurology recommended longitudinal outpatient follow-up to see if he has persistent cognitive changes but felt this is most consistent with metabolic encephalopathy.  They recommended considering reduction in his Vimpat at outpatient follow-up with neurology. Cardiology recommended continue beta-blockade and wean off of as needed hydralazine over time.  Pulmonology recommended an outpatient sleep study which we have arranged before, they have a follow-up appoint with them as well.  He was discharged on 2 L of oxygen at night.    He was admitted to subacute rehab on  to [facility].  There he received subacute rehab for his generalized weakness he did improve with physical and  Occupational Therapy.  Outpatient speech therapy was recommended for cognitive difficulties.  Based upon the progress notes I have reviewed it does not appear there were any significant medication changes.  He had labs on the day of discharge 5/24.  This included a hepatic function panel which demonstrated a bilirubin of 1.3 (it was apparently as high as 1.7, records not available), total protein 5.6, normal AST and ALT, normal sodium of 142 up from 133, creatinine of 0.9, hemoglobin 12.6 up from 11.4.    He has been home since the 24th and they have been receiving home health for regular physical therapy, Occupational Therapy, and nursing.  Wife brings a log of blood pressure measurements which show blood pressures 110s-130 systolic/70s-80s diastolic.  He has not had any lightheadedness, syncope, or chest pain.  He still has intermittent confusion but wife and patient feel that cognition has improved significantly.  Before this he was managing his own bills and medications, and driving without difficulty.  He is not currently driving although wife is asking about it.  He is not having any further cough or shortness of breath.  He is eating well.  He has not had any falls.  He currently ambulates with a cane as he is graduated from a walker through physical therapy.    The following portions of the patient's history were reviewed and updated as appropriate: allergies, current medications, past family history, past medical history, past social history, past surgical history and problem list.    Patient Active Problem List   Diagnosis   • Anxiety   • Hyperlipidemia   • Resistant hypertension   • Insomnia   • Prediabetes   • Fatigue   • Focal epilepsy   • Hygroma   • Seizure   • Partial seizures   • Vitamin D deficiency   • Bilateral lower extremity edema   • H/O lead exposure   • Paroxysmal atrial fibrillation   • Hx of adenomatous polyp of colon   • Presence of Watchman left atrial appendage closure device   •  Ascending aortic aneurysm   • Tricuspid regurgitation   • Chronic heart failure with preserved ejection fraction   • Atrial fibrillation with RVR   • Shortness of breath   • Hypertensive urgency   • Elevated d-dimer   • Bilateral pleural effusion     Past Medical History:   Diagnosis Date   • Abnormal barium swallow 07/08/2016    HH, reflux   • AF (paroxysmal atrial fibrillation)    • Anemia 3/21/2021   • Anxiety    • GERD (gastroesophageal reflux disease)    • H/O cataract     Dr Heron Hoffman   • Hernia, epigastric 10/14/2020    Added automatically from request for surgery [id number]   • Hiatal hernia    • Hyperlipidemia    • Hypertension    • Internal hemorrhoids    • Mood disorder 7/27/2017   • PONV (postoperative nausea and vomiting)    • Seizures     July 2013 was last seizure   • Tubular adenoma of colon    • Umbilical hernia without obstruction and without gangrene 10/14/2020    Added automatically from request for surgery [id number]     Past Surgical History:   Procedure Laterality Date   • CATARACT EXTRACTION Bilateral    • COLONOSCOPY  04/23/2013    Dr Galeano/Maddison, Ih, tubular adenoma w/low grade dysplasia   • COLONOSCOPY N/A 11/3/2020    Procedure: COLONOSCOPY;  Surgeon: Gabo Power MD;  Location: [facility] OR;  Service: General;  Laterality: N/A;   • ENDOSCOPY N/A 12/16/2016    Procedure: ESOPHAGOGASTRODUODENOSCOPY WITH COLD BIOPSIES AND 54 FR KENNY DILATATION;  Surgeon: Shiraz Galeano MD;  Location: [facility] ENDOSCOPY;  Service:    • INGUINAL HERNIA REPAIR      as a child and again in 1970 approx.   • KRISTYN Left 03/2019    Left Atrial Appendage Occlusion W KRISTYN   • TONSILLECTOMY     • VENTRAL/INCISIONAL HERNIA REPAIR N/A 11/3/2020    Procedure: VENTRAL HERNIA REPAIR WITH MESH;  Surgeon: Gabo Power MD;  Location: [facility] OR;  Service: General;  Laterality: N/A;   • WRIST SURGERY Right      Family History   Problem Relation Age of Onset   • Stroke Father    • COPD Father    • Thyroid disease  Daughter    • Thyroid disease Daughter    • Malig Hyperthermia Neg Hx        Current Outpatient Medications:   •  acetaminophen (TYLENOL) 500 MG tablet, Take 1 tablet by mouth Every 4 (Four) Hours As Needed for Mild Pain., Disp: , Rfl:   •  Astaxanthin 4 MG capsule, Take 4 mg by mouth Daily. Indications: SUPPLEMENT, Disp: , Rfl:   •  B Complex Vitamins (VITAMIN B COMPLEX) capsule capsule, Take 1 capsule by mouth Daily. Indications: SUPPLEMENT, Disp: , Rfl:   •  bumetanide (BUMEX) 1 MG tablet, Take 1 tablet by mouth Daily. Indications: Cardiac Failure, Edema, Disp: , Rfl:   •  Coenzyme Q10 (CoQ10) 100 MG capsule, Take 1 capsule by mouth 2 (Two) Times a Day. WITH OBQUINOL IN IT., Disp: , Rfl:   •  hydrALAZINE (APRESOLINE) 100 MG tablet, Take 25 mg by mouth 3 (Three) Times a Day. HOLD FOR SYSTOLIC BP <130  Indications: High Blood Pressure Disorder, Disp: , Rfl: 11  •  KLOR-CON 20 MEQ CR tablet, Take 1 tablet by mouth Daily. Indications: Low Amount of Potassium in the Blood, Disp: , Rfl:   •  lacosamide (Vimpat) 200 MG tablet, Take 1 tablet by mouth 2 (Two) Times a Day. TAKES BRAND VIMPAT  Indications: Seizures that are Not Controlled, Disp: , Rfl:   •  Lactobacillus (PROBIOTIC ACIDOPHILUS PO), Take 1 tablet by mouth Daily., Disp: , Rfl:   •  losartan (COZAAR) 50 MG tablet, Take 1 tablet by mouth Daily. HOLD FOR < 130 SYSTOLIC, Disp: , Rfl:   •  MAGNESIUM CHLORIDE PO, Take 1 tablet by mouth Daily., Disp: , Rfl:   •  Melatonin 3 MG tablet dispersible, Place  on the tongue. Indications: SLEEP, Disp: , Rfl:   •  metoprolol tartrate (LOPRESSOR) 50 MG tablet, Take 1 tablet by mouth 2 (Two) Times a Day. HOLD FOR HR <55, Disp: , Rfl:   •  Misc Natural Products (Cortisol Manager) tablet, Take 1 tablet by mouth Daily. Indications: SUPPLEMENT, Disp: , Rfl:   •  Multiple Vitamin (MULTIVITAMIN PO), Take 1 tablet by mouth Daily. Indications: SUPPLEMENT, Disp: , Rfl:   •  O2 (OXYGEN), Inhale 2 L/min Every Night., Disp: , Rfl:   •   Omega-3 Fatty Acids (OMEGA-3 FISH OIL PO), Take 1 capsule by mouth Daily., Disp: , Rfl:   •  Unable to find, Take 1 each by mouth Daily. Liposomal C 1000 mg,, Disp: , Rfl:   •  Unable to find, Take 1 each by mouth Daily. Ageless Brain by [employer], HOLD ALL SUPPLEMENTS FOR SURGERY  Indications: SUPPLEMENT, Disp: , Rfl:   •  vitamin B-6 (PYRIDOXINE) 100 MG tablet, Take 1 tablet by mouth Daily. HOLD ALL SUPPLEMENTS FOR SURGERY  Indications: SUPPLEMENT, Disp: , Rfl:   •  Vitamin D-Vitamin K (D3 + K2 Dots) 1000-90 UNIT-MCG tablet, Take 1 tablet/day by mouth Daily. HOLD ALL SUPPLEMENTS FOR SURGERY   Indications: SUPPLEMENT, Disp: , Rfl:   No Known Allergies  Social History     Tobacco Use   • Smoking status: Never   • Smokeless tobacco: Never   Vaping Use   • Vaping Use: Never used   Substance Use Topics   • Alcohol use: Yes     Alcohol/week: 3.0 standard drinks     Types: 3 Glasses of wine per week     Comment: daily   • Drug use: Never          Objective   Physical Exam  Vitals:    06/02/23 1412   Pulse: 76   Temp: 96.4 °F (35.8 °C)   TempSrc: Infrared   SpO2: 100%   Weight: 71.2 kg (157 lb)     Body mass index is 22.53 kg/m².    Constitutional: NAD.  Cardiovascular: Irregularly irregular. No murmurs. No LE edema b/l. Radial pulses 2+ bilaterally.  Pulmonary: CTA b/l. Good effort.  Fair effort.  Lung is of symmetric resonance to percussion bilaterally  Integumentary: No rashes or wounds on face or upper extremities.  Lymphatic: No anterior cervical lymphadenopathy.  Endocrine: No thyromegaly or palpable thyroid nodules.  Psychiatric: Normal affect. Normal thought content...  Neurologic: Alert and oriented x4     Assessment & Plan   Assessment and Plan  Pleasant 82-year-old male with seizure disorder, paroxysmal atrial fibrillation with watchman device, ascending aortic aneurysm, resistant hypertension, hyperlipidemia, history of prediabetes, vitamin D deficiency, insomnia, who presents for the following:    Diagnoses  and all orders for this visit:    1. Hospital discharge follow-up (Primary): I reviewed his records as above, although they are limited due to the EMR being down during the second half of his hospitalization.  Overall he is recovering well, plan as below.    2. Metabolic encephalopathy: Suspect transient, he still has some intermittent confusion per wife and does appear frail so I recommend withholding driving as able or only driving when the wife is monitoring in the passenger seat until follow-up in 6 weeks.  If mental status is not continuing to improve at follow-up we we will consider further neurocognitive assessment    3. Pleural effusion, left: Chronic component but not able to appreciate on examination today and he is asymptomatic.    4. Atrial fibrillation with RVR: Atrial fibrillation not in RVR today.  Continue beta-blockade, status post watchman, follow-up with cardiology as scheduled.    5. Anemia, unspecified type: Likely anemia of critical illness we will recheck at follow-up in 6 weeks    6. Abnormal liver function: Hyperbilirubinemia appears to be resolving, also likely acute and will recheck in 6 weeks    7. Weakness: Continue physical therapy and ambulatory assist cane as needed    I spent 1 hour face-to-face with the patient, reviewing records and composing this note    Tomas Lopez MD  Family Medicine  O: [phone]    Disclaimer: Parts of this note were dictated by speech recognition. Minor errors in transcription may be present. Please call if questions.

## 2023-06-05 ENCOUNTER — HOME CARE VISIT (OUTPATIENT)
Dept: HOME HEALTH SERVICES | Facility: HOME HEALTHCARE | Age: 83
End: 2023-06-05
Payer: MEDICARE

## 2023-06-05 VITALS
OXYGEN SATURATION: 98 % | SYSTOLIC BLOOD PRESSURE: 108 MMHG | TEMPERATURE: 97.6 F | DIASTOLIC BLOOD PRESSURE: 82 MMHG | HEART RATE: 79 BPM

## 2023-06-05 PROCEDURE — G0151 HHCP-SERV OF PT,EA 15 MIN: HCPCS

## 2023-06-05 NOTE — HOME HEALTH
"Patient greets therapist using the cane in his right hand.  States he has been walking some but patient's wife encourages patient to recall an event that happened yesterday morning.  Patient had been sitting outside in the sun and was meditating. He got up to use the restroom and he was unable to gain his balance to walk so wife assisted patient and called her nephew who assisted patient to his walker.  Patient denies feeling dizzy and reports he was asymptomatic but had another occurrence when leaning forward to the ground to pick something up last night and he fell into his nightstand.  Patient denies injury and no specific fall to the ground.  He was not using his cane or walker.  He agains reports he was asymptomatic.  Patient's wife states his blood pressure was elevated after first episode by the time she could check it.  He has done his exercises \"some\" but he has been walking and walked up to 25 minutes last night in the garage with his nephew.    Plan for Next Visit:  -Work on transitioning/turning to the left with a wider path  -Address proper hand placement for cane  -Emphasize body awareness with walking/changing positions  -Continue to assess blood pressure/assist in calling MD if call not yet made and patient's blood pressure continues to run low"

## 2023-06-06 ENCOUNTER — HOME CARE VISIT (OUTPATIENT)
Dept: HOME HEALTH SERVICES | Facility: HOME HEALTHCARE | Age: 83
End: 2023-06-06
Payer: MEDICARE

## 2023-06-06 VITALS
DIASTOLIC BLOOD PRESSURE: 70 MMHG | OXYGEN SATURATION: 99 % | SYSTOLIC BLOOD PRESSURE: 122 MMHG | TEMPERATURE: 97.4 F | HEART RATE: 67 BPM | RESPIRATION RATE: 17 BRPM

## 2023-06-06 PROCEDURE — G0152 HHCP-SERV OF OT,EA 15 MIN: HCPCS

## 2023-06-06 PROCEDURE — G0300 HHS/HOSPICE OF LPN EA 15 MIN: HCPCS

## 2023-06-06 NOTE — HOME HEALTH
"SUBJECTIVE: \"I feel like I'm doing better.\"  FALLS: none reported  PLAN FOR NEXT VISIT: review HEP make sure he/she understand it. OT D/C"

## 2023-06-07 ENCOUNTER — HOME CARE VISIT (OUTPATIENT)
Dept: HOME HEALTH SERVICES | Facility: HOME HEALTHCARE | Age: 83
End: 2023-06-07
Payer: MEDICARE

## 2023-06-07 VITALS
SYSTOLIC BLOOD PRESSURE: 116 MMHG | HEART RATE: 91 BPM | DIASTOLIC BLOOD PRESSURE: 94 MMHG | TEMPERATURE: 97.6 F | OXYGEN SATURATION: 99 %

## 2023-06-07 VITALS
SYSTOLIC BLOOD PRESSURE: 136 MMHG | RESPIRATION RATE: 18 BRPM | OXYGEN SATURATION: 97 % | TEMPERATURE: 97.1 F | HEART RATE: 93 BPM | DIASTOLIC BLOOD PRESSURE: 86 MMHG

## 2023-06-07 PROCEDURE — G0151 HHCP-SERV OF PT,EA 15 MIN: HCPCS

## 2023-06-07 NOTE — Clinical Note
Patient's blood pressure at 118/100 and 116/94 during today's PT session.  With record, patient has been intermittently running at 111 and below throughout the day.  He was asymptomatic and all other vitals were stable.  We will continue to monitor but please let us know if you have any further questions/concerns.    Wife did report she has not yet given patient the Losartan at mid day.    Thank you,  Prerna Atkins, PT, DPT  [facility]  [phone]

## 2023-06-07 NOTE — HOME HEALTH
"Patient states he walked a half mile yesterday and felt like he could have gone further.  He had his nephew close by and took his cane but \"I barely use it.\"  Patient feels as if he is getting stronger and wife commented she feels his walking has improved in the last two days.    Patient has had elevated blood pressure in the last couple of days as his diastolic is running around 100.  Patient remains asymptomatic.  Case communication to MD Lopez.    16.8 seconds 5 time sit to stand    Plan for Next Visit:  -Attempt to advance standing exercises/overall strength training with increasing balance adn strength challenges  -Continue to address ambulation on level, uneven surfaces adn for distance with appropriate assistive device"

## 2023-06-08 ENCOUNTER — HOME CARE VISIT (OUTPATIENT)
Dept: HOME HEALTH SERVICES | Facility: HOME HEALTHCARE | Age: 83
End: 2023-06-08
Payer: MEDICARE

## 2023-06-08 VITALS
TEMPERATURE: 97.1 F | OXYGEN SATURATION: 98 % | HEART RATE: 78 BPM | SYSTOLIC BLOOD PRESSURE: 128 MMHG | DIASTOLIC BLOOD PRESSURE: 58 MMHG | RESPIRATION RATE: 18 BRPM

## 2023-06-08 VITALS
RESPIRATION RATE: 17 BRPM | HEART RATE: 73 BPM | DIASTOLIC BLOOD PRESSURE: 78 MMHG | OXYGEN SATURATION: 98 % | SYSTOLIC BLOOD PRESSURE: 130 MMHG | TEMPERATURE: 97.6 F

## 2023-06-08 PROCEDURE — G0152 HHCP-SERV OF OT,EA 15 MIN: HCPCS

## 2023-06-08 PROCEDURE — G0300 HHS/HOSPICE OF LPN EA 15 MIN: HCPCS

## 2023-06-08 NOTE — Clinical Note
OT d/c 06-08-23 w/all goals met. He is Independent and safe w/majority of his ADLs and distant Supervision when showering. He is stronger and able to perform his transfers w/good and safe technique. He is able to perform his BUE AROM HEP by following handout, using a green resistive band or 3# hand wt. He remains active w/PT and SN.

## 2023-06-08 NOTE — HOME HEALTH
"SUBJECTIVE: \"I think I'm ready to be discharged. All my personal care stuff is going good.\"  FALLS: none reported."

## 2023-06-09 ENCOUNTER — TELEPHONE (OUTPATIENT)
Dept: INTERNAL MEDICINE | Facility: CLINIC | Age: 83
End: 2023-06-09
Payer: MEDICARE

## 2023-06-09 NOTE — TELEPHONE ENCOUNTER
Caller: Travis Boo    Relationship: Emergency Contact    Best call back number: [phone]    What is the medical concern/diagnosis: UNSURE    What specialty or service is being requested: SPEECH THERAPY    Any additional details: PATIENT'S WIFE STATES THE A REFERRAL TO SPEECH THERAPY WAS MENTIONED AS BEING NEEDED BY SARITHA MENJIVAR. PER WIFE, THIS REFERRAL WAS DICUSSED IN RECENT HOSPITAL FOLLOW UP VISIT.

## 2023-06-12 NOTE — TELEPHONE ENCOUNTER
"From what I see someone from [facility] closed 6/2 referral stating \"not needed\" and then created a new referral with afib as the DX. I will get with Noreen to figure out what has happened.  "

## 2023-06-13 ENCOUNTER — HOME CARE VISIT (OUTPATIENT)
Dept: HOME HEALTH SERVICES | Facility: HOME HEALTHCARE | Age: 83
End: 2023-06-13
Payer: MEDICARE

## 2023-06-13 ENCOUNTER — TELEPHONE (OUTPATIENT)
Dept: INTERNAL MEDICINE | Facility: CLINIC | Age: 83
End: 2023-06-13
Payer: MEDICARE

## 2023-06-13 VITALS
DIASTOLIC BLOOD PRESSURE: 78 MMHG | SYSTOLIC BLOOD PRESSURE: 116 MMHG | OXYGEN SATURATION: 98 % | TEMPERATURE: 97.9 F | HEART RATE: 72 BPM

## 2023-06-13 PROCEDURE — G0151 HHCP-SERV OF PT,EA 15 MIN: HCPCS

## 2023-06-13 NOTE — TELEPHONE ENCOUNTER
Pt's wife called in stating pt took his HR a few minutes prior to phone call, with pulse oximeter: 134 bpm.    Pt's wife also stated BP was 145/96 approximately 1 hour ago; pt had since taken 25mg hydralazine per instructions if systolic is >130.    Pt took HR and BP during phone call: , /86.    Pt's wife stated pt denied dizziness, HA, chest pain, numbness.    I advised pt's wife to continue to monitor vitals, and to take pt to ER if pt became symptomatic (ie dizziness, HA, chest pain, numbness). I advised I would give this information to Dr. Lopez for review, whether pt needed appt, or what next steps could be.    Pt's wife verbalized understanding.    Pls advise.

## 2023-06-13 NOTE — TELEPHONE ENCOUNTER
Spoke to pt wife about providers recommendations. She is understanding and has no questions. Pt states that BP is now 120/80 with a HR of 142.

## 2023-06-13 NOTE — TELEPHONE ENCOUNTER
Have him take an extra dose of his metoprolol if heart rate still >100, and call for follow up with cardiology  If starting to have symptoms this would be a reason to go to ER

## 2023-06-14 ENCOUNTER — HOME CARE VISIT (OUTPATIENT)
Dept: HOME HEALTH SERVICES | Facility: HOME HEALTHCARE | Age: 83
End: 2023-06-14
Payer: MEDICARE

## 2023-06-14 PROCEDURE — G0300 HHS/HOSPICE OF LPN EA 15 MIN: HCPCS

## 2023-06-14 NOTE — HOME HEALTH
Upon PT arrival, wife greets therapist concerned about how patient is doing/feeling.  Patient had some stomach issues and began shaking earlier today so wife took blood pressure and noted heart rate was around 135.  Patient's blood pressure was reading low to normal but heart rate readings continued to run high.  Wife contacted MD but did not hear back for a few hours so she spoke with home health nurse and nurse with cardiology who suggested that wife take the pulse manually.  Wife had an issue taking the pulse so she was encouraged to give patient an extra Metoprolol and he had that approximately one hour prior to PT arrival.  Patient seated and resting comfortably upon PT arrival stating he was trying to drink a lot of water.  He has walked as much as a mile since PT last presented.  He does admit to feeling very tired yesterday and taking a long nap but he had a good check up with cardiologist and no medications were changed.  Today, patient had planned on walking outside with his nephew a little later this afternoon.    PT assessed patient's heart rate both left and right manually at one hour and 10 minutes following medication given and 1 hour and 40 minutes following medication given.  Blood pressure also taken twice.  Blood pressure was normal and heart ran from 58 to 73.  Advised patient and wife to check blood pressure again prior to walking and to hold if blood pressure was low.  Patient is also advised to stand briefly before walking to ensure that he did not get dizzy with a change in blood pressure.  He and wife verbalize understanding.    Plan for Next Visit:  -Revisit home exercise program for compliance/preparing for discharge if patient is medically stable  -Assess ambulation for safe and recipocal technique, continuing to address appropriate assistive device needed

## 2023-06-15 ENCOUNTER — HOME CARE VISIT (OUTPATIENT)
Dept: HOME HEALTH SERVICES | Facility: HOME HEALTHCARE | Age: 83
End: 2023-06-15
Payer: MEDICARE

## 2023-06-15 VITALS
HEART RATE: 95 BPM | SYSTOLIC BLOOD PRESSURE: 132 MMHG | RESPIRATION RATE: 18 BRPM | OXYGEN SATURATION: 97 % | DIASTOLIC BLOOD PRESSURE: 74 MMHG | TEMPERATURE: 97.1 F

## 2023-06-15 VITALS
TEMPERATURE: 97.1 F | SYSTOLIC BLOOD PRESSURE: 142 MMHG | OXYGEN SATURATION: 99 % | DIASTOLIC BLOOD PRESSURE: 108 MMHG | HEART RATE: 76 BPM

## 2023-06-15 PROCEDURE — G0151 HHCP-SERV OF PT,EA 15 MIN: HCPCS

## 2023-06-15 NOTE — Clinical Note
"Patient sustained two falls yesterday.  Patient fell as late as 11 PM and did \"bump\" his head but denies loss of consciousness, change in mental status or any significant injury.  He was assisted up by nephews.  His early fall did result in a left hand scrap/laceration bandaged by home health nursing.     Patient is now in a different shoe type, some living room furniture has been moved, patient is aware to stand briefly before attempting to walk, to walk only with assistance/supervision and to utilize the rolling walker at this time.  Rolling walker height was adjusted today as well as wheels were placed on the inside of the walker so that the patient can clear doorways and obstacles with greater ease and efficiency.    Please call with any questions or concerns [phone].    Thank you,  Prerna Atkins, PT, DPT  [facility]"

## 2023-06-16 NOTE — HOME HEALTH
"Patient's wife reports patient is feeling dizzy/lightheaded as of this morning.  Patient has fallen twice yesterday and once was before the nurse visit and one was after around 11 PM last night.  Patient did scrape the left hand with initial fall but no injuries with the second fall. He reports \"bumping\" his head but no soreness, no change in mental status and the dizziness did not start until this AM.  Wife states his heart rate was staying high and they have found that his metoprolol dose was cut in half since he has returned home from inpatient rehab. This morning, patient was given the full dosage and they have been monitoring his heart rate and blood pressure.  Heart rate has remained significantly high and blood pressure reads with diastolic in the 100's at times.  Cardiology wants patient to be monitored and they can call again in 24 hours as the medication will take some time to take effect.  Patient denies dizziness while sitting still during PT session.  Both falls occurred when patient was getting up quickly to go to the bathroom so he did not give himself time to get steady.  He is using the rolling walker and wife is trying to walk with him as he does get \"off balance.\"  Patient states he notices but is unable to self correct the issue.  Wife feels the shoes he wore yesterday may also have been problematic.  Patient was assisted up by nephews following both falls.  Patient continues to deny any type of pain.    Patient was observed to be \"short of air\" throughout session following activity but when asked about it he did not feel symptomatic.  Patient had increased 2.5 pounds but had been weighed with clothes on."

## 2023-06-16 NOTE — HOME HEALTH
Patient is a CP assess- monitor for s/s of CHF.  Patient reported he fell prior to nurse arrival, no injuries noted.  MD was notified and SAFE report it done.  Patient has had a 2 lb weight gain overnight and this was called to his cardiolgist, Dr. JEANA vallejo.  SN awaiting call back.

## 2023-06-19 ENCOUNTER — HOME CARE VISIT (OUTPATIENT)
Dept: HOME HEALTH SERVICES | Facility: HOME HEALTHCARE | Age: 83
End: 2023-06-19
Payer: MEDICARE

## 2023-06-26 ENCOUNTER — TELEPHONE (OUTPATIENT)
Dept: INTERNAL MEDICINE | Facility: CLINIC | Age: 83
End: 2023-06-26

## 2023-06-26 NOTE — TELEPHONE ENCOUNTER
Caller: MOHINI - [place] OCCUPATIONAL THERAPIST    Relationship: [facility]    Best call back number: [phone]     What is the best time to reach you : ANYTIME    Who are you requesting to speak with (clinical staff, provider,  specific staff member): CLINICAL    What was the call regarding: MOHINI , AN OCCUPATIONAL THERAPIST WITH [facility] STATES SHE IS REQUESTING VERBAL ORDERS FOR THE PATIENT TO HAVE OCCUPATIONAL THERAPY 2 TIMES A WEEK FOR 1 WEEK AND THEN 1 TIME A WEEK FOR 2 WEEKS.     MOHINI IS REQUESTING A CALL BACK.

## 2023-07-14 PROBLEM — Z99.81 ON SUPPLEMENTAL OXYGEN THERAPY: Status: ACTIVE | Noted: 2023-07-14

## 2023-07-14 PROBLEM — G47.33 OSA (OBSTRUCTIVE SLEEP APNEA): Status: ACTIVE | Noted: 2023-07-14

## 2023-07-14 PROBLEM — I16.0 HYPERTENSIVE URGENCY: Status: RESOLVED | Noted: 2023-04-22 | Resolved: 2023-07-14

## 2023-07-14 PROBLEM — R06.02 SHORTNESS OF BREATH: Status: RESOLVED | Noted: 2023-04-22 | Resolved: 2023-07-14

## 2023-08-15 ENCOUNTER — OFFICE VISIT (OUTPATIENT)
Dept: INTERNAL MEDICINE | Facility: CLINIC | Age: 83
End: 2023-08-15
Payer: MEDICARE

## 2023-08-15 VITALS
SYSTOLIC BLOOD PRESSURE: 114 MMHG | HEART RATE: 47 BPM | WEIGHT: 164 LBS | DIASTOLIC BLOOD PRESSURE: 62 MMHG | BODY MASS INDEX: 23.53 KG/M2 | TEMPERATURE: 96.9 F | OXYGEN SATURATION: 98 %

## 2023-08-15 DIAGNOSIS — I48.91 ATRIAL FIBRILLATION WITH RVR: ICD-10-CM

## 2023-08-15 DIAGNOSIS — I10 RESISTANT HYPERTENSION: Primary | ICD-10-CM

## 2023-08-15 DIAGNOSIS — G47.33 OSA (OBSTRUCTIVE SLEEP APNEA): ICD-10-CM

## 2023-08-15 DIAGNOSIS — J90 BILATERAL PLEURAL EFFUSION: ICD-10-CM

## 2023-08-15 PROCEDURE — 3074F SYST BP LT 130 MM HG: CPT | Performed by: FAMILY MEDICINE

## 2023-08-15 PROCEDURE — 3078F DIAST BP <80 MM HG: CPT | Performed by: FAMILY MEDICINE

## 2023-08-15 PROCEDURE — 99212 OFFICE O/P EST SF 10 MIN: CPT | Performed by: FAMILY MEDICINE

## 2023-08-15 RX ORDER — METOPROLOL SUCCINATE 100 MG/1
100 TABLET, EXTENDED RELEASE ORAL 2 TIMES DAILY
COMMUNITY

## 2023-08-15 NOTE — PROGRESS NOTES
Date of Encounter: 08/15/2023  Patient: Gabo Boo,  1940    Subjective   History of Presenting Illness  Chief complaint: Follow-up deconditioning, hypertension    No acute concerns.    Hypertension, has remained normotensive at home and has not needed as needed hydralazine in many weeks.  No dizziness or headaches.    Atrial fibrillation with RVR with no recent symptomatic RVR episodes.  They have not consulted with Dr. Luna yet.    Following with cardiology for ascending aortic aneurysm approximately 5 cm.  They recommend continued annual monitoring versus no further monitoring.  Patient and wife prefer annual monitoring.  He is tolerating beta-blockade.    Bilateral pleural effusions, following with pulmonology with plan for follow-up chest x-ray.  He denies cough and shortness of breath.  Bilateral pleural effusions are appreciable on examination today.    TIFFANY not currently on CPAP but going through the process of getting a machine for this.    Has been doing physical therapy as an outpatient with good improvement in strength.  Still feels a little off balance but has graduated from a walker to a cane now to dual walking sticks.    The following portions of the patient's history were reviewed and updated as appropriate: allergies, current medications, past family history, past medical history, past social history, past surgical history and problem list.    Patient Active Problem List   Diagnosis    Anxiety    Hyperlipidemia    Resistant hypertension    Insomnia    Prediabetes    Focal epilepsy    Hygroma    Seizure    Partial seizures    Vitamin D deficiency    Bilateral lower extremity edema    H/O lead exposure    Paroxysmal atrial fibrillation    Hx of adenomatous polyp of colon    Presence of Watchman left atrial appendage closure device    Ascending aortic aneurysm    Tricuspid regurgitation    Chronic heart failure with preserved ejection fraction    Atrial fibrillation with RVR     Elevated d-dimer    Bilateral pleural effusion    TIFFANY (obstructive sleep apnea)    On supplemental oxygen therapy at night     Past Medical History:   Diagnosis Date    Abnormal barium swallow 07/08/2016    HH, reflux    AF (paroxysmal atrial fibrillation)     Anemia 3/21/2021    Anxiety     GERD (gastroesophageal reflux disease)     H/O cataract     Dr Heron Hoffman    Hernia, epigastric 10/14/2020    Added automatically from request for surgery [id number]    Hiatal hernia     Hyperlipidemia     Hypertension     Internal hemorrhoids     Mood disorder 7/27/2017    PONV (postoperative nausea and vomiting)     Seizures     July 2013 was last seizure    Tubular adenoma of colon     Umbilical hernia without obstruction and without gangrene 10/14/2020    Added automatically from request for surgery [id number]     Past Surgical History:   Procedure Laterality Date    CATARACT EXTRACTION Bilateral     COLONOSCOPY  04/23/2013    Dr Galeano/BONY-dontes, Ih, tubular adenoma w/low grade dysplasia    COLONOSCOPY N/A 11/3/2020    Procedure: COLONOSCOPY;  Surgeon: Gabo Power MD;  Location: [facility] OR;  Service: General;  Laterality: N/A;    ENDOSCOPY N/A 12/16/2016    Procedure: ESOPHAGOGASTRODUODENOSCOPY WITH COLD BIOPSIES AND 54 FR KENNY DILATATION;  Surgeon: Shiraz Galeano MD;  Location: [facility] ENDOSCOPY;  Service:     INGUINAL HERNIA REPAIR      as a child and again in 1970 approx.    KRISTYN Left 03/2019    Left Atrial Appendage Occlusion W KRISTYN    TONSILLECTOMY      VENTRAL/INCISIONAL HERNIA REPAIR N/A 11/3/2020    Procedure: VENTRAL HERNIA REPAIR WITH MESH;  Surgeon: Gabo Power MD;  Location: [facility] OR;  Service: General;  Laterality: N/A;    WRIST SURGERY Right      Family History   Problem Relation Age of Onset    Stroke Father     COPD Father     Thyroid disease Daughter     Thyroid disease Daughter     Malig Hyperthermia Neg Hx        Current Outpatient Medications:     B Complex Vitamins (VITAMIN B COMPLEX)  capsule capsule, Take 1 capsule by mouth Daily. Indications: SUPPLEMENT, Disp: , Rfl:     bumetanide (BUMEX) 1 MG tablet, Take 1 tablet by mouth Daily. Indications: Cardiac Failure, Edema, Disp: , Rfl:     Coenzyme Q10 (CoQ10) 100 MG capsule, Take 1 capsule by mouth 2 (Two) Times a Day. WITH OBQUINOL IN IT.   Indications: heart failure, Disp: , Rfl:     dilTIAZem CD (CARDIZEM CD) 120 MG 24 hr capsule, Take 1 capsule by mouth Daily. Indications: a. fib., Disp: , Rfl:     lacosamide (Vimpat) 200 MG tablet, Take 1 tablet by mouth 2 (Two) Times a Day. TAKES BRAND VIMPAT  Indications: Seizures that are Not Controlled, Disp: , Rfl:     Lactobacillus (PROBIOTIC ACIDOPHILUS PO), Take 1 tablet by mouth Daily. Indications: general wellness, Disp: , Rfl:     losartan (COZAAR) 50 MG tablet, Take 2 tablets by mouth Daily. HOLD FOR < 130 SYSTOLIC  Indications: High Blood Pressure Disorder, Disp: , Rfl:     Melatonin 3 MG tablet dispersible, Place  on the tongue. Indications: SLEEP, Disp: , Rfl:     metoprolol succinate XL (TOPROL-XL) 100 MG 24 hr tablet, Take 1 tablet by mouth 2 (Two) Times a Day., Disp: , Rfl:     Misc Natural Products (Cortisol Manager) tablet, Take 1 tablet by mouth Daily. Indications: SUPPLEMENT, Disp: , Rfl:     Multiple Vitamin (MULTIVITAMIN PO), Take 1 tablet by mouth Daily. Indications: SUPPLEMENT, Disp: , Rfl:     Nattokinase 100 MG capsule, Take 200 mg by mouth Daily. Indications: general wellness, Disp: , Rfl:     O2 (OXYGEN), Inhale 2 L/min Every Night. Indications: hypoxia, Disp: , Rfl:     Omega-3 Fatty Acids (OMEGA-3 FISH OIL PO), Take 1 capsule by mouth Daily. Indications: general wellness, Disp: , Rfl:     spironolactone (ALDACTONE) 25 MG tablet, Take 1 tablet by mouth Daily., Disp: , Rfl:     Vitamin D-Vitamin K (D3 + K2 Dots) 1000-90 UNIT-MCG tablet, Take 1 tablet/day by mouth Daily. HOLD ALL SUPPLEMENTS FOR SURGERY   Indications: SUPPLEMENT, Disp: , Rfl:   No Known Allergies  Social History      Tobacco Use    Smoking status: Never    Smokeless tobacco: Never   Vaping Use    Vaping Use: Never used   Substance Use Topics    Alcohol use: Yes     Alcohol/week: 3.0 standard drinks     Types: 3 Glasses of wine per week     Comment: daily    Drug use: Never          Objective   Physical Exam  Vitals:    08/15/23 1108   BP: 114/62   Pulse: (!) 47   Temp: 96.9 øF (36.1 øC)   TempSrc: Infrared   SpO2: 98%   Weight: 74.4 kg (164 lb)     Body mass index is 23.53 kg/mý.    Constitutional: NAD.  Cardiovascular: Irregularly irregular. No murmurs. No LE edema b/l. Radial pulses 2+ bilaterally.  Pulmonary: High-pitched inspiratory and expiratory wheeze with reduced aeration of both bases.  Clear to auscultation otherwise.  No focal crackles.  Good effort.  Integumentary: No rashes or wounds on face or upper extremities.  Lymphatic: No anterior cervical lymphadenopathy.  Endocrine: No thyromegaly or palpable thyroid nodules.  Psychiatric: Normal affect. Normal thought content.  Musculoskeletal: Strength 2+ in hip flexors, knee extensors, elbow flexors and extensors bilaterally.     Assessment & Plan   Assessment and Plan  Pleasant 82-year-old male with seizure disorder, paroxysmal atrial fibrillation with watchman device and recurrent RVR, heart failure with preserved ejection fraction and bilateral pleural effusions, ascending aortic aneurysm, resistant hypertension, hyperlipidemia, history of prediabetes, vitamin D deficiency, insomnia, among other problems, who presents for the following:      Diagnoses and all orders for this visit:    1. Resistant hypertension (Primary): Controlled    2. Atrial fibrillation with RVR: Consultation with electrophysiology pending, otherwise no recurrent episodes on rate control    3. TIFFANY (obstructive sleep apnea): Agree with trial of CPAP    4. Bilateral pleural effusion: Continue to follow with pulmonology    He appears much better than our last visit and this is a reassuring  follow-up.  We will have him back before the end of the year for his Medicare wellness with routine blood work.    Tomas Lopez MD  Family Medicine  O: [phone]    Disclaimer: Parts of this note were dictated by speech recognition. Minor errors in transcription may be present. Please call if questions.

## 2023-12-15 ENCOUNTER — OFFICE VISIT (OUTPATIENT)
Dept: INTERNAL MEDICINE | Facility: CLINIC | Age: 83
End: 2023-12-15
Payer: MEDICARE

## 2023-12-15 VITALS
TEMPERATURE: 97.1 F | SYSTOLIC BLOOD PRESSURE: 128 MMHG | DIASTOLIC BLOOD PRESSURE: 78 MMHG | HEART RATE: 66 BPM | HEIGHT: 70 IN | OXYGEN SATURATION: 97 % | WEIGHT: 172.6 LBS | BODY MASS INDEX: 24.71 KG/M2

## 2023-12-15 DIAGNOSIS — R06.02 CHRONIC SHORTNESS OF BREATH: ICD-10-CM

## 2023-12-15 DIAGNOSIS — R73.03 PREDIABETES: ICD-10-CM

## 2023-12-15 DIAGNOSIS — I50.32 CHRONIC HEART FAILURE WITH PRESERVED EJECTION FRACTION: ICD-10-CM

## 2023-12-15 DIAGNOSIS — J90 BILATERAL PLEURAL EFFUSION: ICD-10-CM

## 2023-12-15 DIAGNOSIS — I48.91 ATRIAL FIBRILLATION WITH RVR: ICD-10-CM

## 2023-12-15 DIAGNOSIS — E55.9 VITAMIN D DEFICIENCY: ICD-10-CM

## 2023-12-15 DIAGNOSIS — I1A.0 RESISTANT HYPERTENSION: ICD-10-CM

## 2023-12-15 DIAGNOSIS — E78.5 HYPERLIPIDEMIA, UNSPECIFIED HYPERLIPIDEMIA TYPE: ICD-10-CM

## 2023-12-15 DIAGNOSIS — G45.9 TIA (TRANSIENT ISCHEMIC ATTACK): Primary | ICD-10-CM

## 2023-12-15 NOTE — PROGRESS NOTES
Date of Encounter: 12/15/2023  Patient: Gabo Boo,  1940    Subjective   History of Presenting Illness  Chief complaint: ER follow-up    Patient went to the emergency department on 2023 for dizziness.  He had been taking a cough syrup with codeine for an upper respiratory illness and they felt this was associated with his mental status change.  Wife noted confusion, speech changes, also balance changes with near fall.  He did undergo a CT of the head, which was unchanged from prior, and because his symptoms resolved they did not proceed with MRI but recommended prompt follow-up with physician.    He has not had any recurrent episodes since that visit.  He has not been taking the codeine-based cough syrup.  He denies shortness of breath.  His cough has resolved after nebulized hydrogen peroxide treatment that his wife gave.  He has not noticed any lower extremity swelling.  He has not been entirely adherent to his diuretic regimen due to urination considerations.    Atrial fibrillation status post Watchman procedure, they opted to use nattokinase instead of aspirin for stroke prevention.  I have not been able to find trials with using this for stroke prevention and atrial fibrillation.    The following portions of the patient's history were reviewed and updated as appropriate: allergies, current medications, past family history, past medical history, past social history, past surgical history and problem list.    Patient Active Problem List   Diagnosis    Anxiety    Hyperlipidemia    Resistant hypertension    Insomnia    Prediabetes    Focal epilepsy    Hygroma    Seizure    Partial seizures    Vitamin D deficiency    Bilateral lower extremity edema    H/O lead exposure    Paroxysmal atrial fibrillation    Hx of adenomatous polyp of colon    Presence of Watchman left atrial appendage closure device    Ascending aortic aneurysm    Tricuspid regurgitation    Chronic heart failure with preserved  ejection fraction    Atrial fibrillation with RVR    Elevated d-dimer    Bilateral pleural effusion    TIFFANY (obstructive sleep apnea)    On supplemental oxygen therapy at night     Past Medical History:   Diagnosis Date    Abnormal barium swallow 07/08/2016    HH, reflux    AF (paroxysmal atrial fibrillation)     Anemia 3/21/2021    Anxiety     GERD (gastroesophageal reflux disease)     H/O cataract     Dr Heron Hoffman    Hernia, epigastric 10/14/2020    Added automatically from request for surgery [id number]    Hiatal hernia     Hyperlipidemia     Hypertension     Internal hemorrhoids     Mood disorder 7/27/2017    PONV (postoperative nausea and vomiting)     Seizures     July 2013 was last seizure    Tubular adenoma of colon     Umbilical hernia without obstruction and without gangrene 10/14/2020    Added automatically from request for surgery [id number]     Past Surgical History:   Procedure Laterality Date    CATARACT EXTRACTION Bilateral     COLONOSCOPY  04/23/2013    Dr Galeano/[place], , tubular adenoma w/low grade dysplasia    COLONOSCOPY N/A 11/3/2020    Procedure: COLONOSCOPY;  Surgeon: Gabo Power MD;  Location: [facility] OR;  Service: General;  Laterality: N/A;    ENDOSCOPY N/A 12/16/2016    Procedure: ESOPHAGOGASTRODUODENOSCOPY WITH COLD BIOPSIES AND 54 FR KENNY DILATATION;  Surgeon: Shiraz Galeano MD;  Location: [facility] ENDOSCOPY;  Service:     INGUINAL HERNIA REPAIR      as a child and again in 1970 approx.    KRISTYN Left 03/2019    Left Atrial Appendage Occlusion W KRISTYN    TONSILLECTOMY      VENTRAL/INCISIONAL HERNIA REPAIR N/A 11/3/2020    Procedure: VENTRAL HERNIA REPAIR WITH MESH;  Surgeon: Gabo Power MD;  Location: [facility] OR;  Service: General;  Laterality: N/A;    WRIST SURGERY Right      Family History   Problem Relation Age of Onset    Stroke Father     COPD Father     Thyroid disease Daughter     Thyroid disease Daughter     Malig Hyperthermia Neg Hx        Current Outpatient  Medications:     B Complex Vitamins (VITAMIN B COMPLEX) capsule capsule, Take 1 capsule by mouth Daily. Indications: SUPPLEMENT, Disp: , Rfl:     bumetanide (BUMEX) 1 MG tablet, Take 1 tablet by mouth Daily. Indications: Cardiac Failure, Edema, Disp: , Rfl:     Coenzyme Q10 (CoQ10) 100 MG capsule, Take 1 capsule by mouth 2 (Two) Times a Day. WITH OBQUINOL IN IT.   Indications: heart failure, Disp: , Rfl:     dilTIAZem CD (CARDIZEM CD) 120 MG 24 hr capsule, Take 1 capsule by mouth Daily. Indications: a. fib., Disp: , Rfl:     lacosamide (Vimpat) 200 MG tablet, Take 1 tablet by mouth 2 (Two) Times a Day. TAKES BRAND VIMPAT  Indications: Seizures that are Not Controlled, Disp: , Rfl:     Lactobacillus (PROBIOTIC ACIDOPHILUS PO), Take 1 tablet by mouth Daily. Indications: general wellness, Disp: , Rfl:     losartan (COZAAR) 50 MG tablet, Take 2 tablets by mouth Daily. HOLD FOR < 130 SYSTOLIC  Indications: High Blood Pressure Disorder, Disp: , Rfl:     Melatonin 3 MG tablet dispersible, Place  on the tongue. Indications: SLEEP, Disp: , Rfl:     metoprolol succinate XL (TOPROL-XL) 100 MG 24 hr tablet, Take 1 tablet by mouth 2 (Two) Times a Day., Disp: , Rfl:     Multiple Vitamin (MULTIVITAMIN PO), Take 1 tablet by mouth Daily. Indications: SUPPLEMENT, Disp: , Rfl:     Nattokinase 100 MG capsule, Take 200 mg by mouth Daily. Indications: general wellness, Disp: , Rfl:     Omega-3 Fatty Acids (OMEGA-3 FISH OIL PO), Take 1 capsule by mouth Daily. Indications: general wellness, Disp: , Rfl:     spironolactone (ALDACTONE) 25 MG tablet, Take 1 tablet by mouth Daily., Disp: , Rfl:     Vitamin D-Vitamin K (D3 + K2 Dots) 1000-90 UNIT-MCG tablet, Take 1 tablet/day by mouth Daily. HOLD ALL SUPPLEMENTS FOR SURGERY   Indications: SUPPLEMENT, Disp: , Rfl:   No Known Allergies  Social History     Tobacco Use    Smoking status: Never    Smokeless tobacco: Never   Vaping Use    Vaping Use: Never used   Substance Use Topics    Alcohol use:  "Yes     Alcohol/week: 3.0 standard drinks of alcohol     Types: 3 Glasses of wine per week     Comment: daily    Drug use: Never          Objective   Physical Exam  Vitals:    12/15/23 1114   BP: 128/78   BP Location: Left arm   Patient Position: Sitting   Cuff Size: Adult   Pulse: 66   Temp: 97.1 °F (36.2 °C)   TempSrc: Infrared   SpO2: 97%   Weight: 78.3 kg (172 lb 9.6 oz)   Height: 177.8 cm (70\")     Body mass index is 24.77 kg/m².    Constitutional: NAD.  Cardiovascular: RRR. No murmurs. No LE edema b/l.   Pulmonary: Bilateral pleural effusions to the mid lung in both fields.  These are fairly large effusions by my exam.  There is no lower extremity edema.  Psychiatric: Word-finding difficulties.  Short immediate term recall difficulties.  Cognitive slowing.  Neurologic: Cranial nerves intact.     Assessment & Plan   Assessment and Plan  Pleasant 83-year-old male with seizure disorder, paroxysmal atrial fibrillation with watchman device and recurrent RVR, heart failure with preserved ejection fraction and bilateral pleural effusions, ascending aortic aneurysm, resistant hypertension, hyperlipidemia, history of prediabetes, vitamin D deficiency, insomnia, among other problems, who presents for the following:       Diagnoses and all orders for this visit:    1. TIA (transient ischemic attack) (Primary): Based upon episode of altered mentation and risk factors including atrial fibrillation status post Watchman procedure on nattokinase instead of aspirin, recommend MRI to evaluate for stroke.  Will arrange.  2. Atrial fibrillation with RVR    3. Chronic heart failure with preserved ejection fraction: Pleural effusions are almost larger on my exam compared to previous, may be related to suboptimal diuretic therapy.  Chest x-ray during recent ER visit did show moderate bilateral pleural effusions.  Recommend he adhere to his diuretics as prescribed, will get BNP, but also recommend they follow-up with cardiology.  4. " Bilateral pleural effusion  -     BNP ([facility] Only)  -     Procalcitonin    Other labs as below for routine monitoring    5. Resistant hypertension  -     Urinalysis With Microscopic - Urine, Clean Catch    6. Chronic shortness of breath  -     BNP ([facility] Only)    7. Hyperlipidemia, unspecified hyperlipidemia type  -     CBC & Differential  -     Comprehensive Metabolic Panel  -     Lipid Panel  -     Thyroid Panel With TSH    8. Prediabetes  -     Hemoglobin A1c    9. Vitamin D deficiency  -     Vitamin D,25-Hydroxy    BMI is within normal parameters. No other follow-up for BMI required.      Follow-up in 3 months for Medicare wellness    Tomas Lopez MD  Family Medicine  O: [phone]    Disclaimer: Parts of this note were dictated by speech recognition. Minor errors in transcription may be present. Please call if questions.

## 2023-12-16 LAB
25(OH)D3+25(OH)D2 SERPL-MCNC: 46.8 NG/ML (ref 30–100)
ALBUMIN SERPL-MCNC: 3.8 G/DL (ref 3.7–4.7)
ALBUMIN/GLOB SERPL: 1.4 {RATIO} (ref 1.2–2.2)
ALP SERPL-CCNC: 141 IU/L (ref 44–121)
ALT SERPL-CCNC: 17 IU/L (ref 0–44)
APPEARANCE UR: CLEAR
AST SERPL-CCNC: 25 IU/L (ref 0–40)
BACTERIA #/AREA URNS HPF: NORMAL /[HPF]
BASOPHILS # BLD AUTO: 0.1 X10E3/UL (ref 0–0.2)
BASOPHILS NFR BLD AUTO: 1 %
BILIRUB SERPL-MCNC: 0.7 MG/DL (ref 0–1.2)
BILIRUB UR QL STRIP: NEGATIVE
BNP SERPL-MCNC: 87 PG/ML (ref 0–100)
BUN SERPL-MCNC: 11 MG/DL (ref 8–27)
BUN/CREAT SERPL: 10 (ref 10–24)
CALCIUM SERPL-MCNC: 9.6 MG/DL (ref 8.6–10.2)
CASTS URNS QL MICRO: NORMAL /LPF
CHLORIDE SERPL-SCNC: 97 MMOL/L (ref 96–106)
CHOLEST SERPL-MCNC: 107 MG/DL (ref 100–199)
CO2 SERPL-SCNC: 28 MMOL/L (ref 20–29)
COLOR UR: YELLOW
CREAT SERPL-MCNC: 1.06 MG/DL (ref 0.76–1.27)
EGFRCR SERPLBLD CKD-EPI 2021: 71 ML/MIN/1.73
EOSINOPHIL # BLD AUTO: 0.2 X10E3/UL (ref 0–0.4)
EOSINOPHIL NFR BLD AUTO: 3 %
EPI CELLS #/AREA URNS HPF: NORMAL /HPF (ref 0–10)
ERYTHROCYTE [DISTWIDTH] IN BLOOD BY AUTOMATED COUNT: 11.3 % (ref 11.6–15.4)
FT4I SERPL CALC-MCNC: 2.5 (ref 1.2–4.9)
GLOBULIN SER CALC-MCNC: 2.8 G/DL (ref 1.5–4.5)
GLUCOSE SERPL-MCNC: 105 MG/DL (ref 70–99)
GLUCOSE UR QL STRIP: NEGATIVE
HBA1C MFR BLD: 6.3 % (ref 4.8–5.6)
HCT VFR BLD AUTO: 36.9 % (ref 37.5–51)
HDLC SERPL-MCNC: 35 MG/DL
HGB BLD-MCNC: 12.8 G/DL (ref 13–17.7)
HGB UR QL STRIP: NEGATIVE
IMM GRANULOCYTES # BLD AUTO: 0 X10E3/UL (ref 0–0.1)
IMM GRANULOCYTES NFR BLD AUTO: 0 %
KETONES UR QL STRIP: NEGATIVE
LDLC SERPL CALC-MCNC: 61 MG/DL (ref 0–99)
LEUKOCYTE ESTERASE UR QL STRIP: NEGATIVE
LYMPHOCYTES # BLD AUTO: 0.8 X10E3/UL (ref 0.7–3.1)
LYMPHOCYTES NFR BLD AUTO: 11 %
MCH RBC QN AUTO: 34 PG (ref 26.6–33)
MCHC RBC AUTO-ENTMCNC: 34.7 G/DL (ref 31.5–35.7)
MCV RBC AUTO: 98 FL (ref 79–97)
MICRO URNS: NORMAL
MICRO URNS: NORMAL
MONOCYTES # BLD AUTO: 0.7 X10E3/UL (ref 0.1–0.9)
MONOCYTES NFR BLD AUTO: 11 %
NEUTROPHILS # BLD AUTO: 5.1 X10E3/UL (ref 1.4–7)
NEUTROPHILS NFR BLD AUTO: 74 %
NITRITE UR QL STRIP: NEGATIVE
PH UR STRIP: 7 [PH] (ref 5–7.5)
PLATELET # BLD AUTO: 319 X10E3/UL (ref 150–450)
POTASSIUM SERPL-SCNC: 4.8 MMOL/L (ref 3.5–5.2)
PROCALCITONIN SERPL-MCNC: 0.03 NG/ML (ref 0–0.08)
PROT SERPL-MCNC: 6.6 G/DL (ref 6–8.5)
PROT UR QL STRIP: NEGATIVE
RBC # BLD AUTO: 3.77 X10E6/UL (ref 4.14–5.8)
RBC #/AREA URNS HPF: NORMAL /HPF (ref 0–2)
SODIUM SERPL-SCNC: 139 MMOL/L (ref 134–144)
SP GR UR STRIP: 1.02 (ref 1–1.03)
T3RU NFR SERPL: 31 % (ref 24–39)
T4 SERPL-MCNC: 8 UG/DL (ref 4.5–12)
TRIGL SERPL-MCNC: 44 MG/DL (ref 0–149)
TSH SERPL DL<=0.005 MIU/L-ACNC: 1.68 UIU/ML (ref 0.45–4.5)
UROBILINOGEN UR STRIP-MCNC: 1 MG/DL (ref 0.2–1)
VLDLC SERPL CALC-MCNC: 11 MG/DL (ref 5–40)
WBC # BLD AUTO: 6.9 X10E3/UL (ref 3.4–10.8)
WBC #/AREA URNS HPF: NORMAL /HPF (ref 0–5)

## 2023-12-20 ENCOUNTER — TELEPHONE (OUTPATIENT)
Dept: INTERNAL MEDICINE | Facility: CLINIC | Age: 83
End: 2023-12-20
Payer: MEDICARE

## 2023-12-20 DIAGNOSIS — R05.3 CHRONIC COUGH: Primary | ICD-10-CM

## 2023-12-20 NOTE — TELEPHONE ENCOUNTER
Rx'd/administered 1/10/2018 during hospital visit for seizure, new onset afib, influenza A/acute bronchitis. Has not had since.      HUB TO RELAY:     Pt needs appt/evaluation.

## 2023-12-20 NOTE — TELEPHONE ENCOUNTER
"    Caller: Gabo Boo \"KAY\"    Relationship: Self    Best call back number: [phone]    What medication are you requesting: HYRDOCODINE HOMATROPINE SOLUTION 5ML BY MOUTH A NIGHT    What are your current symptoms: COUGH AT NIGHT WITH YELLOW/GREEN MUCUS    How long have you been experiencing symptoms: ONGOING    Have you had these symptoms before:    [x] Yes  [] No    Have you been treated for these symptoms before:   [x] Yes  [] No    If a prescription is needed, what is your preferred pharmacy and phone number: [id number] - [place], [place] - [address] AT [address]AR [facility] & [address] [facility] - [phone] [facility] [phone] FX     Additional notes:          "

## 2023-12-20 NOTE — TELEPHONE ENCOUNTER
Taking this medication at night would suppress his cough and increase his risk of severe pneumonia in context of his effusion    I would not recommend continuing it    Is he getting a worsening cough? I just heard his lungs on 12/15 and his chronic effusion was present and would treat him with antibiotics out the gate if he was developing a worsening cough

## 2023-12-21 RX ORDER — HYDROCODONE BITARTRATE AND HOMATROPINE METHYLBROMIDE ORAL SOLUTION 5; 1.5 MG/5ML; MG/5ML
2.5 LIQUID ORAL EVERY 12 HOURS PRN
Qty: 120 ML | Refills: 0 | Status: SHIPPED | OUTPATIENT
Start: 2023-12-21

## 2023-12-21 NOTE — TELEPHONE ENCOUNTER
I will send in a short course of the hydrocodone cough syrup to help with severe episodes like this but recommend avoiding regular use

## 2023-12-21 NOTE — TELEPHONE ENCOUNTER
PT said that his cough has gotten better but what he is experiencing is cough spasm where he is coughing for 2 to 3 minutes. Except for last night which he reported coughing for 10 to 15 minutes on three different times.

## 2023-12-21 NOTE — TELEPHONE ENCOUNTER
Received fax from pharmacy.    HYDROcodone Bit-Homatrop MBr (HYCODAN) 5-1.5 MG/5ML solution  is not covered by insurance.    I do not have access to his Rx insurance information, and if you do want a PA, I need rationale for this specific medication.    Please advise.

## 2024-01-18 ENCOUNTER — HOSPITAL ENCOUNTER (OUTPATIENT)
Dept: MRI IMAGING | Facility: HOSPITAL | Age: 84
Discharge: HOME | End: 2024-01-18
Admitting: FAMILY MEDICINE
Payer: MEDICARE

## 2024-01-18 DIAGNOSIS — I48.91 ATRIAL FIBRILLATION WITH RVR: ICD-10-CM

## 2024-01-18 DIAGNOSIS — G45.9 TIA (TRANSIENT ISCHEMIC ATTACK): ICD-10-CM

## 2024-01-18 PROCEDURE — 70553 MRI BRAIN STEM W/O & W/DYE: CPT

## 2024-01-18 PROCEDURE — 0 GADOBENATE DIMEGLUMINE 529 MG/ML SOLUTION: Performed by: FAMILY MEDICINE

## 2024-01-18 PROCEDURE — A9577 INJ MULTIHANCE: HCPCS | Performed by: FAMILY MEDICINE

## 2024-01-18 RX ADMIN — GADOBENATE DIMEGLUMINE 15 ML: 529 INJECTION, SOLUTION INTRAVENOUS at 11:16

## 2024-02-12 RX ORDER — OLMESARTAN MEDOXOMIL 40 MG/1
40 TABLET ORAL DAILY
Qty: 90 TABLET | Refills: 2 | OUTPATIENT
Start: 2024-02-12

## 2024-02-19 ENCOUNTER — OFFICE VISIT (OUTPATIENT)
Dept: INTERNAL MEDICINE | Facility: CLINIC | Age: 84
End: 2024-02-19
Payer: MEDICARE

## 2024-02-19 VITALS — DIASTOLIC BLOOD PRESSURE: 72 MMHG | SYSTOLIC BLOOD PRESSURE: 122 MMHG

## 2024-02-19 DIAGNOSIS — Z95.818 PRESENCE OF WATCHMAN LEFT ATRIAL APPENDAGE CLOSURE DEVICE: ICD-10-CM

## 2024-02-19 DIAGNOSIS — I50.32 CHRONIC HEART FAILURE WITH PRESERVED EJECTION FRACTION: ICD-10-CM

## 2024-02-19 DIAGNOSIS — G47.33 OSA (OBSTRUCTIVE SLEEP APNEA): ICD-10-CM

## 2024-02-19 DIAGNOSIS — F41.9 ANXIETY: ICD-10-CM

## 2024-02-19 DIAGNOSIS — R26.89 BALANCE DISORDER: ICD-10-CM

## 2024-02-19 DIAGNOSIS — R29.898 WEAKNESS OF BOTH LOWER EXTREMITIES: ICD-10-CM

## 2024-02-19 DIAGNOSIS — I1A.0 RESISTANT HYPERTENSION: ICD-10-CM

## 2024-02-19 DIAGNOSIS — R53.81 PHYSICAL DECONDITIONING: ICD-10-CM

## 2024-02-19 DIAGNOSIS — I77.9 BILATERAL CAROTID ARTERY DISEASE, UNSPECIFIED TYPE: ICD-10-CM

## 2024-02-19 DIAGNOSIS — J90 BILATERAL PLEURAL EFFUSION: ICD-10-CM

## 2024-02-19 DIAGNOSIS — R56.9 SEIZURE: ICD-10-CM

## 2024-02-19 DIAGNOSIS — R53.1 GENERALIZED WEAKNESS: ICD-10-CM

## 2024-02-19 DIAGNOSIS — I48.0 PAROXYSMAL ATRIAL FIBRILLATION: ICD-10-CM

## 2024-02-19 DIAGNOSIS — E55.9 VITAMIN D DEFICIENCY: ICD-10-CM

## 2024-02-19 DIAGNOSIS — Z00.00 ENCOUNTER FOR SUBSEQUENT ANNUAL WELLNESS VISIT (AWV) IN MEDICARE PATIENT: Primary | ICD-10-CM

## 2024-02-19 DIAGNOSIS — I71.21 ASCENDING AORTIC ANEURYSM, UNSPECIFIED WHETHER RUPTURED: ICD-10-CM

## 2024-02-19 DIAGNOSIS — M25.531 WRIST PAIN, CHRONIC, RIGHT: ICD-10-CM

## 2024-02-19 DIAGNOSIS — R73.03 PREDIABETES: ICD-10-CM

## 2024-02-19 DIAGNOSIS — E78.5 HYPERLIPIDEMIA, UNSPECIFIED HYPERLIPIDEMIA TYPE: ICD-10-CM

## 2024-02-19 DIAGNOSIS — I07.1 TRICUSPID VALVE INSUFFICIENCY, UNSPECIFIED ETIOLOGY: ICD-10-CM

## 2024-02-19 DIAGNOSIS — G89.29 WRIST PAIN, CHRONIC, RIGHT: ICD-10-CM

## 2024-02-19 PROBLEM — R60.0 BILATERAL LOWER EXTREMITY EDEMA: Status: RESOLVED | Noted: 2018-03-06 | Resolved: 2024-02-19

## 2024-02-19 PROBLEM — R79.89 ELEVATED D-DIMER: Status: RESOLVED | Noted: 2023-04-22 | Resolved: 2024-02-19

## 2024-02-19 PROCEDURE — G0439 PPPS, SUBSEQ VISIT: HCPCS | Performed by: FAMILY MEDICINE

## 2024-02-19 PROCEDURE — 3074F SYST BP LT 130 MM HG: CPT | Performed by: FAMILY MEDICINE

## 2024-02-19 PROCEDURE — 3078F DIAST BP <80 MM HG: CPT | Performed by: FAMILY MEDICINE

## 2024-02-19 RX ORDER — BUMETANIDE 1 MG/1
1 TABLET ORAL EVERY OTHER DAY
COMMUNITY
Start: 2024-02-13

## 2024-02-19 RX ORDER — LOSARTAN POTASSIUM 100 MG/1
100 TABLET ORAL DAILY
COMMUNITY
Start: 2024-01-03

## 2024-02-19 RX ORDER — PERPHENAZINE/AMITRIPTYLINE HCL 2 MG-10 MG
TABLET ORAL
COMMUNITY

## 2024-02-19 NOTE — PROGRESS NOTES
The ABCs of the Annual Wellness Visit  Subsequent Medicare Wellness Visit    Subjective      Gabo Boo is a 83 y.o. male who presents for a Subsequent Medicare Wellness Visit.    Chief complaint: Annual physical    No acute concerns.    Congestive heart failure with preserved ejection fraction, last EF 55% 2023.  BMP was normal during last check and he remains euvolemic at home on current Bumex dose.    Atrial fibrillation with history of subdural hematoma now status post Watchman device not on OAC.  Rate control with metoprolol and diltiazem with no recent episodes of RVR.    Bilateral carotid artery disease with upcoming ultrasound per cardiology.  Asymptomatic.    Hypertension controlled on current regimen.    Ascending aortic aneurysm 5 cm 2023.  Following with vascular surgery.    Hyperlipidemia not on statin therapy with last LDL 61.    Borderline diabetes, not currently on medication, last hemoglobin A1c 6.3%.  He has not been very active over the past 2 to 3 months.    Seizure disorder currently controlled with Vimpat with no recent episodes.    TIFFANY adherent to CPAP.  Asking about inspire.    Some chronic deconditioning with difficulties with balance, chronic right wrist pain, weakness in the legs.  He has benefited from physical therapy before and would like to restart that.     No acute concerns.     Hypertension, controlled with new home monitor and on triage check.  No side effects on current regimen.     Paroxysmal atrial fibrillation with watchman device on aspirin 81 mg once daily.  Not currently symptomatic.     Ascending aortic aneurysm stable at 5.1 cm on 6/24/2022 currently being managed by cardiology every 6 months.     Hyperlipidemia but recent lipids are normal with an LDL of 79.  He has not tolerated statins previously.      Vitamin D on supplementation.      Lives with wife.  3 children.  Never smoker.  1 glass of red wine per day.  Not exercising regularly right now. Healthy diet,  has to watch his sweet tooth. Retired, worked as a consulting .  Has a living will on file. Last colon cancer screening 2020 with 5-year follow-up.  Discussed bivalent COVID-vaccine, influenza, Shingrix and Td which he would like to consider.     The following portions of the patient's history were reviewed and   updated as appropriate: allergies, current medications, past family history, past medical history, past social history, past surgical history, and problem list.    Compared to one year ago, the patient feels his physical   health is the same.    Compared to one year ago, the patient feels his mental   health is the same.    Recent Hospitalizations:  He was admitted within the past 365 days at [facility]       Current Medical Providers:  Patient Care Team:  Tomas Lopez MD as PCP - General (Family Medicine)    Outpatient Medications Prior to Visit   Medication Sig Dispense Refill    bumetanide (BUMEX) 1 MG tablet Take 1 tablet by mouth Every Other Day.      losartan (COZAAR) 100 MG tablet Take 1 tablet by mouth Daily.      Astaxanthin 4 MG capsule Take  by mouth.      B Complex Vitamins (VITAMIN B COMPLEX) capsule capsule Take 1 capsule by mouth Daily. Indications: SUPPLEMENT      Coenzyme Q10 (CoQ10) 100 MG capsule Take 1 capsule by mouth 2 (Two) Times a Day. WITH OBQUINOL IN IT.   Indications: heart failure      dilTIAZem CD (CARDIZEM CD) 120 MG 24 hr capsule Take 1 capsule by mouth Daily. Indications: a. fib.      HYDROcodone Bit-Homatrop MBr (HYCODAN) 5-1.5 MG/5ML solution Take 2.5 mL by mouth Every 12 (Twelve) Hours As Needed for Cough. 120 mL 0    lacosamide (Vimpat) 200 MG tablet Take 1 tablet by mouth 2 (Two) Times a Day. TAKES BRAND VIMPAT  Indications: Seizures that are Not Controlled      Lactobacillus (PROBIOTIC ACIDOPHILUS PO) Take 1 tablet by mouth Daily. Indications: general wellness      Melatonin 3 MG tablet dispersible Place  on the tongue. Indications:  SLEEP      metoprolol succinate XL (TOPROL-XL) 100 MG 24 hr tablet Take 1 tablet by mouth 2 (Two) Times a Day.      Multiple Vitamin (MULTIVITAMIN PO) Take 1 tablet by mouth Daily. Indications: SUPPLEMENT      Nattokinase 100 MG capsule Take 200 mg by mouth Daily. Indications: general wellness      Omega-3 Fatty Acids (OMEGA-3 FISH OIL PO) Take 1 capsule by mouth Daily. Indications: general wellness      spironolactone (ALDACTONE) 25 MG tablet Take 1 tablet by mouth Daily.      Vitamin D-Vitamin K (D3 + K2 Dots) 1000-90 UNIT-MCG tablet Take 1 tablet/day by mouth Daily. HOLD ALL SUPPLEMENTS FOR SURGERY   Indications: SUPPLEMENT      bumetanide (BUMEX) 1 MG tablet Take 1 tablet by mouth Daily. Indications: Cardiac Failure, Edema      losartan (COZAAR) 50 MG tablet Take 2 tablets by mouth Daily. HOLD FOR < 130 SYSTOLIC  Indications: High Blood Pressure Disorder       No facility-administered medications prior to visit.       No opioid medication identified on active medication list. I have reviewed chart for other potential  high risk medication/s and harmful drug interactions in the elderly.        Aspirin is not on active medication list.  Aspirin use is not indicated based on review of current medical condition/s. Risk of harm outweighs potential benefits.  .    Patient Active Problem List   Diagnosis    Anxiety    Hyperlipidemia    Resistant hypertension    Insomnia    Prediabetes    Focal epilepsy    Hygroma    Seizure    Partial seizures    Vitamin D deficiency    H/O lead exposure    Paroxysmal atrial fibrillation    Hx of adenomatous polyp of colon    Presence of Watchman left atrial appendage closure device    Ascending aortic aneurysm    Tricuspid regurgitation    Chronic heart failure with preserved ejection fraction    Atrial fibrillation with RVR    Bilateral pleural effusion    TIFFANY (obstructive sleep apnea) on CPAP    On supplemental oxygen therapy at night    Bilateral carotid artery disease     Advance  "Care Planning   Advance Care Planning     Advance Directive is on file.  ACP discussion was held with the patient during this visit. Patient has an advance directive in EMR which is still valid.      Objective    Vitals:    24 1455   BP: 122/72     Estimated body mass index is 24.68 kg/m² as calculated from the following:    Height as of 12/15/23: 177.8 cm (70\").    Weight as of 24: 78 kg (172 lb).    BMI is within normal parameters. No other follow-up for BMI required.      Does the patient have evidence of cognitive impairment?   No    Lab Results   Component Value Date    CHLPL 107 12/15/2023    TRIG 44 12/15/2023    HDL 35 (L) 12/15/2023    LDL 61 12/15/2023    VLDL 11 12/15/2023    HGBA1C 6.3 (H) 12/15/2023          HEALTH RISK ASSESSMENT    Smoking Status:  Social History     Tobacco Use   Smoking Status Never   Smokeless Tobacco Never     Alcohol Consumption:  Social History     Substance and Sexual Activity   Alcohol Use Yes    Alcohol/week: 3.0 standard drinks of alcohol    Types: 3 Glasses of wine per week    Comment: daily     Fall Risk Screen:    STEADI Fall Risk Assessment has not been completed.    Depression Screenin/14/2023     2:20 PM   PHQ-2/PHQ-9 Depression Screening   Little Interest or Pleasure in Doing Things 0-->not at all   Feeling Down, Depressed or Hopeless 0-->not at all   PHQ-9: Brief Depression Severity Measure Score 0       Health Habits and Functional and Cognitive Screenin/15/2024    11:09 AM   Functional & Cognitive Status   Do you have difficulty preparing food and eating? No   Do you have difficulty bathing yourself, getting dressed or grooming yourself? No   Do you have difficulty using the toilet? No   Do you have difficulty moving around from place to place? No   Do you have trouble with steps or getting out of a bed or a chair? No   Current Diet Well Balanced Diet   Dental Exam Up to date   Eye Exam Not up to date   Exercise (times per week) 1 times " per week   Current Exercises Include Home Exercise Program (TV, Computer, Etc.);Walking   Do you need help using the phone?  No   Are you deaf or do you have serious difficulty hearing?  No   Do you need help to go to places out of walking distance? No   Do you need help shopping? No   Do you need help preparing meals?  No   Do you need help with housework?  No   Do you need help with laundry? No   Do you need help taking your medications? No   Do you need help managing money? No   Do you ever drive or ride in a car without wearing a seat belt? No   Have you felt unusual stress, anger or loneliness in the last month? No   Who do you live with? Spouse   If you need help, do you have trouble finding someone available to you? No   Have you been bothered in the last four weeks by sexual problems? No       Age-appropriate Screening Schedule:  Refer to the list below for future screening recommendations based on patient's age, sex and/or medical conditions. Orders for these recommended tests are listed in the plan section. The patient has been provided with a written plan.    Health Maintenance   Topic Date Due    RSV Vaccine - Adults (1 - 1-dose 60+ series) Never done    ZOSTER VACCINE (2 of 3) 02/26/2008    COVID-19 Vaccine (3 - 2023-24 season) 09/01/2023    ANNUAL WELLNESS VISIT  09/23/2023    LIPID PANEL  12/15/2024    COLORECTAL CANCER SCREENING  11/03/2025    TDAP/TD VACCINES (3 - Td or Tdap) 12/09/2032    Pneumococcal Vaccine 65+  Completed    INFLUENZA VACCINE  Discontinued                  CMS Preventative Services Quick Reference  Risk Factors Identified During Encounter:    Immunizations Discussed/Encouraged: Shingrix, COVID19, and RSV (Respiratory Syncytial Virus)  Inactivity/Sedentary: Patient was advised to exercise at least 150 minutes a week per CDC recommendations.    The above risks/problems have been discussed with the patient.  Pertinent information has been shared with the patient in the After Visit  Summary.    Pleasant 83-year-old male with seizure disorder, paroxysmal atrial fibrillation with watchman device, heart failure with preserved ejection fraction and bilateral pleural effusions, ascending aortic aneurysm, resistant hypertension, hyperlipidemia, history of prediabetes, vitamin D deficiency, insomnia, among other problems, who presents for the following:        Diagnoses and all orders for this visit:    1. Encounter for subsequent annual wellness visit (AWV) in Medicare patient (Primary): We discussed preventative care including age and patient-appropriate immunizations and cancer screening. We also discussed the importance of exercise and a healthy diet. This is their annual preventative exam.    2. Chronic heart failure with preserved ejection fraction: Euvolemic today, continue to follow with cardiology, discussed symptoms of heart failure    3. Paroxysmal atrial fibrillation: Not on OAC status post watchman with history of subdural bleed.  Tolerating rate control    4. Tricuspid valve insufficiency, unspecified etiology: Per #2    5. Bilateral pleural effusion: Minimal on exam today, likely secondary to #2    6. Presence of Watchman left atrial appendage closure device: #2    7. Bilateral carotid artery disease, unspecified type: Follow-up cardiology as scheduled    8. Ascending aortic aneurysm, unspecified whether ruptured: Follow-up with vascular surgery as scheduled    9. Resistant hypertension: Controlled    10. Hyperlipidemia, unspecified hyperlipidemia type: No indication for statin    11. Prediabetes: Encourage resumption of regular exercise for glycemic control    12. Seizure: No recent episodes    13. TIFFANY (obstructive sleep apnea): Adherent, I do not think the advantages of the inspire outweigh the downsides compared to CPAP, especially with chronic pleural effusions    14. Anxiety: Controlled    15. Generalized weakness  -     Ambulatory Referral to Physical Therapy Evaluate and  treat  16. Weakness of both lower extremities  -     Ambulatory Referral to Physical Therapy Evaluate and treat  17. Balance disorder  -     Ambulatory Referral to Physical Therapy Evaluate and treat  18. Physical deconditioning  -     Ambulatory Referral to Physical Therapy Evaluate and treat  19. Wrist pain, chronic, right  -     Ambulatory Referral to Physical Therapy Evaluate and treat    20. Vitamin D deficiency      Follow Up:   Next Medicare Wellness visit to be scheduled in 1 year.      An After Visit Summary and PPPS were made available to the patient.

## 2024-02-20 ENCOUNTER — TELEPHONE (OUTPATIENT)
Dept: INTERNAL MEDICINE | Facility: CLINIC | Age: 84
End: 2024-02-20
Payer: MEDICARE

## 2024-02-20 NOTE — TELEPHONE ENCOUNTER
"  Caller: Gabo Boo \"KAY\"    Relationship: Self    Best call back number: [phone]    What is the best time to reach you: ANYTIME     Who are you requesting to speak with (clinical staff, provider,  specific staff member): DR. GONCALVES    Do you know the name of the person who called: PATIENT    What was the call regarding: PRIVATE MATTER    Is it okay if the provider responds through MyChart: NO            "

## 2024-02-23 NOTE — TELEPHONE ENCOUNTER
Discussed this with pt, advised that he would need an office visit to discuss issues with provider.     Pt understanding and has no questions at this time.

## 2024-02-23 NOTE — TELEPHONE ENCOUNTER
PATIENT IS AGAIN CALLING WISHING TO SPEAK WITH DR. DARIN GONCALVES MD ABOUT A PERSONAL MATTER.    HE REQUEST THAT THIS NUMBER BE USED TO CONTACT [phone] (Mobile)

## 2024-02-26 ENCOUNTER — OFFICE VISIT (OUTPATIENT)
Dept: INTERNAL MEDICINE | Facility: CLINIC | Age: 84
End: 2024-02-26
Payer: MEDICARE

## 2024-02-26 VITALS
BODY MASS INDEX: 24.97 KG/M2 | SYSTOLIC BLOOD PRESSURE: 122 MMHG | TEMPERATURE: 96.8 F | HEART RATE: 59 BPM | WEIGHT: 174.4 LBS | OXYGEN SATURATION: 99 % | HEIGHT: 70 IN | DIASTOLIC BLOOD PRESSURE: 80 MMHG

## 2024-02-26 DIAGNOSIS — N62 GYNECOMASTIA: Primary | ICD-10-CM

## 2024-02-26 DIAGNOSIS — I50.32 CHRONIC HEART FAILURE WITH PRESERVED EJECTION FRACTION: ICD-10-CM

## 2024-02-26 PROCEDURE — 3074F SYST BP LT 130 MM HG: CPT | Performed by: FAMILY MEDICINE

## 2024-02-26 PROCEDURE — 99214 OFFICE O/P EST MOD 30 MIN: CPT | Performed by: FAMILY MEDICINE

## 2024-02-26 PROCEDURE — 3079F DIAST BP 80-89 MM HG: CPT | Performed by: FAMILY MEDICINE

## 2024-02-26 RX ORDER — EPLERENONE 25 MG/1
25 TABLET, FILM COATED ORAL DAILY
Qty: 90 TABLET | Refills: 3 | Status: SHIPPED | OUTPATIENT
Start: 2024-02-26

## 2024-02-26 NOTE — PROGRESS NOTES
Date of Encounter: 2024  Patient: Gabo Boo,  1940    Subjective   History of Presenting Illness  Chief complaint: Breast pain    Several weeks to months of bilateral breast discomfort.  May be some slightly reduced libido.  No recent changes in alcohol intake.  No recent changes in medications.  No family history of BRCA gene abnormality or breast cancer that he is aware of.  No nipple discharge.    The following portions of the patient's history were reviewed and updated as appropriate: allergies, current medications, past family history, past medical history, past social history, past surgical history and problem list.    Patient Active Problem List   Diagnosis    Anxiety    Hyperlipidemia    Resistant hypertension    Insomnia    Prediabetes    Focal epilepsy    Hygroma    Seizure    Partial seizures    Vitamin D deficiency    H/O lead exposure    Paroxysmal atrial fibrillation    Hx of adenomatous polyp of colon    Presence of Watchman left atrial appendage closure device    Ascending aortic aneurysm    Tricuspid regurgitation    Chronic heart failure with preserved ejection fraction    Atrial fibrillation with RVR    Bilateral pleural effusion    TIFFANY (obstructive sleep apnea) on CPAP    On supplemental oxygen therapy at night    Bilateral carotid artery disease    Gynecomastia     Past Medical History:   Diagnosis Date    Abnormal barium swallow 2016    HH, reflux    AF (paroxysmal atrial fibrillation)     Anemia 2021    Anxiety     Bilateral lower extremity edema     GERD (gastroesophageal reflux disease)     H/O cataract     Dr Heron Hoffman    Hernia, epigastric 10/14/2020    Added automatically from request for surgery [id number]    Hiatal hernia     Hyperlipidemia     Hypertension     Internal hemorrhoids     Mood disorder 2017    PONV (postoperative nausea and vomiting)     Seizures     2013 was last seizure    Tubular adenoma of colon     Umbilical hernia  without obstruction and without gangrene 10/14/2020    Added automatically from request for surgery [id number]     Past Surgical History:   Procedure Laterality Date    CATARACT EXTRACTION Bilateral     COLONOSCOPY  04/23/2013    Dr Galeano/E-tics, Ih, tubular adenoma w/low grade dysplasia    COLONOSCOPY N/A 11/3/2020    Procedure: COLONOSCOPY;  Surgeon: Gabo Power MD;  Location: [facility] MAIN OR;  Service: General;  Laterality: N/A;    ENDOSCOPY N/A 12/16/2016    Procedure: ESOPHAGOGASTRODUODENOSCOPY WITH COLD BIOPSIES AND 54 FR KENNY DILATATION;  Surgeon: Shiraz Galeano MD;  Location: [facility] ENDOSCOPY;  Service:     INGUINAL HERNIA REPAIR      as a child and again in 1970 approx.    KRISTYN Left 03/2019    Left Atrial Appendage Occlusion W KRISTYN    TONSILLECTOMY      VENTRAL/INCISIONAL HERNIA REPAIR N/A 11/3/2020    Procedure: VENTRAL HERNIA REPAIR WITH MESH;  Surgeon: Gabo Power MD;  Location: [facility] MAIN OR;  Service: General;  Laterality: N/A;    WRIST SURGERY Right      Family History   Problem Relation Age of Onset    Stroke Father     COPD Father     Thyroid disease Daughter     Thyroid disease Daughter     Malig Hyperthermia Neg Hx        Current Outpatient Medications:     Astaxanthin 4 MG capsule, Take  by mouth., Disp: , Rfl:     B Complex Vitamins (VITAMIN B COMPLEX) capsule capsule, Take 1 capsule by mouth Daily. Indications: SUPPLEMENT, Disp: , Rfl:     bumetanide (BUMEX) 1 MG tablet, Take 1 tablet by mouth Every Other Day., Disp: , Rfl:     Coenzyme Q10 (CoQ10) 100 MG capsule, Take 1 capsule by mouth 2 (Two) Times a Day. WITH OBQUINOL IN IT.   Indications: heart failure, Disp: , Rfl:     dilTIAZem CD (CARDIZEM CD) 120 MG 24 hr capsule, Take 1 capsule by mouth Daily. Indications: a. fib., Disp: , Rfl:     HYDROcodone Bit-Homatrop MBr (HYCODAN) 5-1.5 MG/5ML solution, Take 2.5 mL by mouth Every 12 (Twelve) Hours As Needed for Cough., Disp: 120 mL, Rfl: 0    lacosamide (Vimpat) 200 MG tablet, Take 1  "tablet by mouth 2 (Two) Times a Day. TAKES BRAND VIMPAT  Indications: Seizures that are Not Controlled, Disp: , Rfl:     Lactobacillus (PROBIOTIC ACIDOPHILUS PO), Take 1 tablet by mouth Daily. Indications: general wellness, Disp: , Rfl:     losartan (COZAAR) 100 MG tablet, Take 1 tablet by mouth Daily., Disp: , Rfl:     Melatonin 3 MG tablet dispersible, Place  on the tongue. Indications: SLEEP, Disp: , Rfl:     metoprolol succinate XL (TOPROL-XL) 100 MG 24 hr tablet, Take 1 tablet by mouth 2 (Two) Times a Day., Disp: , Rfl:     Multiple Vitamin (MULTIVITAMIN PO), Take 1 tablet by mouth Daily. Indications: SUPPLEMENT, Disp: , Rfl:     Nattokinase 100 MG capsule, Take 200 mg by mouth Daily. Indications: general wellness, Disp: , Rfl:     Omega-3 Fatty Acids (OMEGA-3 FISH OIL PO), Take 1 capsule by mouth Daily. Indications: general wellness, Disp: , Rfl:     Vitamin D-Vitamin K (D3 + K2 Dots) 1000-90 UNIT-MCG tablet, Take 1 tablet/day by mouth Daily. HOLD ALL SUPPLEMENTS FOR SURGERY   Indications: SUPPLEMENT, Disp: , Rfl:     eplerenone (INSPRA) 25 MG tablet, Take 1 tablet by mouth Daily., Disp: 90 tablet, Rfl: 3  No Known Allergies  Social History     Tobacco Use    Smoking status: Never    Smokeless tobacco: Never   Vaping Use    Vaping Use: Never used   Substance Use Topics    Alcohol use: Yes     Alcohol/week: 3.0 standard drinks of alcohol     Types: 3 Glasses of wine per week     Comment: daily    Drug use: Never          Objective   Physical Exam  Vitals:    02/26/24 1033   BP: 122/80   BP Location: Left arm   Patient Position: Sitting   Cuff Size: Adult   Pulse: 59   Temp: 96.8 °F (36 °C)   TempSrc: Infrared   SpO2: 99%   Weight: 79.1 kg (174 lb 6.4 oz)   Height: 177.8 cm (70\")     Body mass index is 25.02 kg/m².    Constitutional: NAD.  Psychiatric: Normal affect. Normal thought content.  Breast: Bilateral subareolar tender gynecomastia with no palpable nodules.     Assessment & Plan   Assessment and " Plan  Pleasant 83-year-old male with seizure disorder, paroxysmal atrial fibrillation with watchman device and recurrent RVR, heart failure with preserved ejection fraction and bilateral pleural effusions, ascending aortic aneurysm, resistant hypertension, hyperlipidemia, history of prediabetes, vitamin D deficiency, insomnia, among other problems, who presents for the following:        Diagnoses and all orders for this visit:    1. Gynecomastia (Primary)    2. Chronic heart failure with preserved ejection fraction  -     eplerenone (INSPRA) 25 MG tablet; Take 1 tablet by mouth Daily.  Dispense: 90 tablet; Refill: 3    Recommend switching from spironolactone to eplerenone due to medication induced gynecomastia.  May also be related to diltiazem.  May not be entirely avoidable side effect in context of his comorbidities but we will try this.  Discussed reasons to follow-up for further evaluation.    Tomas Lopez MD  Family Medicine  O: [phone]    Disclaimer: Parts of this note were dictated by speech recognition. Minor errors in transcription may be present. Please call if questions.

## 2024-04-11 ENCOUNTER — PATIENT OUTREACH (OUTPATIENT)
Dept: CASE MANAGEMENT | Facility: OTHER | Age: 84
End: 2024-04-11
Payer: MEDICARE

## 2024-04-11 NOTE — OUTREACH NOTE
"Patient Outreach    AMBULATORY CASE MANAGEMENT NOTE    Name and Relationship of Patient/Support Person: Gabo Boo \"KAY\" - Self    Call placed to patient and introduced self and role of ACM. At this time he states he is doing well and denies any needs. He has kept ACM numbers in case needed in the future.        Shraddha HARRIS  Ambulatory Case Management    4/11/2024, 11:45 EDT  "

## 2024-06-04 NOTE — TELEPHONE ENCOUNTER
, pt is on Benicar 40 mg and hydrALAZINE 100 4 times a day and you added NIFEdipine 10 TID PRN. He does not recall what you told him, he's asking should I be taking all meds or do I need to  some? He took today one hydrALAZINE, BP was 150/94 then he took one NIFEdipine , bp after 130/84.   Should he be taking 4 tabs hydrALAZINE daily with NIFEdipine 10 mg 3 times prn and  Benicar 40 at night?   What Type Of Note Output Would You Prefer (Optional)?: Standard Output What Is The Reason For Today's Visit?: Full Body Skin Examination with No Concerns What Is The Reason For Today's Visit? (Being Monitored For X): concerning skin lesions on a periodic basis

## 2024-08-23 ENCOUNTER — OFFICE VISIT (OUTPATIENT)
Dept: INTERNAL MEDICINE | Facility: CLINIC | Age: 84
End: 2024-08-23
Payer: MEDICARE

## 2024-08-23 ENCOUNTER — TELEPHONE (OUTPATIENT)
Dept: INTERNAL MEDICINE | Facility: CLINIC | Age: 84
End: 2024-08-23

## 2024-08-23 VITALS
OXYGEN SATURATION: 97 % | TEMPERATURE: 97.3 F | HEIGHT: 70 IN | DIASTOLIC BLOOD PRESSURE: 80 MMHG | SYSTOLIC BLOOD PRESSURE: 128 MMHG | HEART RATE: 71 BPM | WEIGHT: 173 LBS | BODY MASS INDEX: 24.77 KG/M2

## 2024-08-23 DIAGNOSIS — E78.5 HYPERLIPIDEMIA, UNSPECIFIED HYPERLIPIDEMIA TYPE: ICD-10-CM

## 2024-08-23 DIAGNOSIS — E55.9 VITAMIN D DEFICIENCY: ICD-10-CM

## 2024-08-23 DIAGNOSIS — I48.91 ATRIAL FIBRILLATION WITH RVR: ICD-10-CM

## 2024-08-23 DIAGNOSIS — I50.32 CHRONIC HEART FAILURE WITH PRESERVED EJECTION FRACTION: ICD-10-CM

## 2024-08-23 DIAGNOSIS — J90 BILATERAL PLEURAL EFFUSION: Primary | ICD-10-CM

## 2024-08-23 DIAGNOSIS — R73.03 PREDIABETES: ICD-10-CM

## 2024-08-23 PROCEDURE — 3074F SYST BP LT 130 MM HG: CPT | Performed by: FAMILY MEDICINE

## 2024-08-23 PROCEDURE — 3079F DIAST BP 80-89 MM HG: CPT | Performed by: FAMILY MEDICINE

## 2024-08-23 PROCEDURE — 99214 OFFICE O/P EST MOD 30 MIN: CPT | Performed by: FAMILY MEDICINE

## 2024-08-23 NOTE — TELEPHONE ENCOUNTER
Caller: Travis Boo     Relationship: SPOUSE    Best call back number:     [phone]       What is your medical concern?     SPOUSE FORGOT TO ASK YOU IF THE PATIENT HEART RATE IS BOUNCING AROUND DOES SHE ALLOW IT UNTIL SHE MEETS WITH THE CARDIOLOGIST ON WEDNESDAY?      CARDIOLOGY OFFICE DOUBLED THE  dilTIAZem     PLEASE CALL AND ADVISE

## 2024-08-23 NOTE — PROGRESS NOTES
Date of Encounter: 2024  Patient: Gabo Boo,  1940    Subjective   History of Presenting Illness  Chief complaint: Pleural effusion    Patient presents to discuss pleural effusion with recent increase in shortness of breath.  He underwent imaging of his chest on 8/15/2024 which demonstrated a moderate right pleural effusion increased from previous imaging and a decrease in a left pleural effusion.  Chest x-ray from 2023, 2023, 2023, demonstrated these effusions, and this also includes additional chest x-rays and a CT angiogram of the chest earlier that year which also demonstrate these pleural effusions.  CT angiogram of the chest in  did not show these.    Over the past week he has noticed a slight increase in his shortness of breath.  He has not been entirely adherent to his diuretics.  He does feel occasionally lightheaded.    He denies fever, chills, cough, night sweats.  He does have a thoracentesis scheduled for next Thursday.    The following portions of the patient's history were reviewed and updated as appropriate: allergies, current medications, past family history, past medical history, past social history, past surgical history and problem list.    Patient Active Problem List   Diagnosis    Anxiety    Hyperlipidemia    Resistant hypertension    Insomnia    Prediabetes    Focal epilepsy    Hygroma    Seizure    Partial seizures    Vitamin D deficiency    H/O lead exposure    Paroxysmal atrial fibrillation    Hx of adenomatous polyp of colon    Presence of Watchman left atrial appendage closure device    Ascending aortic aneurysm    Tricuspid regurgitation    Chronic heart failure with preserved ejection fraction    Atrial fibrillation with RVR    Bilateral pleural effusion    TIFFANY (obstructive sleep apnea) on CPAP    On supplemental oxygen therapy at night    Bilateral carotid artery disease    Gynecomastia     Past Medical History:   Diagnosis Date    Abnormal barium  swallow 07/08/2016    HH, reflux    AF (paroxysmal atrial fibrillation)     Anemia 03/21/2021    Anxiety     Bilateral lower extremity edema     CHF (congestive heart failure) 6-2023    Coronary artery disease     GERD (gastroesophageal reflux disease)     H/O cataract     Dr Heron Hoffman    Hernia, epigastric 10/14/2020    Added automatically from request for surgery [id number]    Hiatal hernia     Hyperlipidemia     Hypertension     Internal hemorrhoids     Mood disorder 07/27/2017    PONV (postoperative nausea and vomiting)     Seizures     July 2013 was last seizure    Tubular adenoma of colon     Umbilical hernia without obstruction and without gangrene 10/14/2020    Added automatically from request for surgery [id number]     Past Surgical History:   Procedure Laterality Date    CARDIAC SURGERY      CATARACT EXTRACTION Bilateral     COLONOSCOPY  04/23/2013    Dr Galeano/Maddison, Ih, tubular adenoma w/low grade dysplasia    COLONOSCOPY N/A 11/03/2020    Procedure: COLONOSCOPY;  Surgeon: Gabo Power MD;  Location: [facility] OR;  Service: General;  Laterality: N/A;    ENDOSCOPY N/A 12/16/2016    Procedure: ESOPHAGOGASTRODUODENOSCOPY WITH COLD BIOPSIES AND 54 FR KENNY DILATATION;  Surgeon: Shiraz Galeano MD;  Location: [facility] ENDOSCOPY;  Service:     INGUINAL HERNIA REPAIR      as a child and again in 1970 approx.    KRISTYN Left 03/2019    Left Atrial Appendage Occlusion W KRISTYN    TONSILLECTOMY      VENTRAL/INCISIONAL HERNIA REPAIR N/A 11/03/2020    Procedure: VENTRAL HERNIA REPAIR WITH MESH;  Surgeon: Gabo Power MD;  Location: [facility] MAIN OR;  Service: General;  Laterality: N/A;    WRIST SURGERY Right      Family History   Problem Relation Age of Onset    Stroke Father     COPD Father     Thyroid disease Daughter     Thyroid disease Daughter     Malig Hyperthermia Neg Hx        Current Outpatient Medications:     Astaxanthin 4 MG capsule, Take  by mouth., Disp: , Rfl:     B Complex Vitamins (VITAMIN B  COMPLEX) capsule capsule, Take 1 capsule by mouth Daily. Indications: SUPPLEMENT, Disp: , Rfl:     bumetanide (BUMEX) 1 MG tablet, Take 1 tablet by mouth Every Other Day., Disp: , Rfl:     Coenzyme Q10 (CoQ10) 100 MG capsule, Take 1 capsule by mouth 2 (Two) Times a Day. WITH OBQUINOL IN IT.   Indications: heart failure, Disp: , Rfl:     dilTIAZem CD (CARDIZEM CD) 120 MG 24 hr capsule, Take 1 capsule by mouth Daily. Indications: a. fib., Disp: , Rfl:     eplerenone (INSPRA) 25 MG tablet, Take 1 tablet by mouth Daily., Disp: 90 tablet, Rfl: 3    lacosamide (Vimpat) 200 MG tablet, Take 1 tablet by mouth 2 (Two) Times a Day. TAKES BRAND VIMPAT  Indications: Seizures that are Not Controlled, Disp: , Rfl:     Lactobacillus (PROBIOTIC ACIDOPHILUS PO), Take 1 tablet by mouth Daily. Indications: general wellness, Disp: , Rfl:     losartan (COZAAR) 100 MG tablet, Take 1 tablet by mouth Daily., Disp: , Rfl:     Melatonin 3 MG tablet dispersible, Place  on the tongue. Indications: SLEEP, Disp: , Rfl:     metoprolol succinate XL (TOPROL-XL) 100 MG 24 hr tablet, Take 1 tablet by mouth 2 (Two) Times a Day., Disp: , Rfl:     Multiple Vitamin (MULTIVITAMIN PO), Take 1 tablet by mouth Daily. Indications: SUPPLEMENT, Disp: , Rfl:     Nattokinase 100 MG capsule, Take 200 mg by mouth Daily. Indications: general wellness, Disp: , Rfl:     Omega-3 Fatty Acids (OMEGA-3 FISH OIL PO), Take 1 capsule by mouth Daily. Indications: general wellness, Disp: , Rfl:     Vitamin D-Vitamin K (D3 + K2 Dots) 1000-90 UNIT-MCG tablet, Take 1 tablet/day by mouth Daily. HOLD ALL SUPPLEMENTS FOR SURGERY   Indications: SUPPLEMENT, Disp: , Rfl:   No Known Allergies  Social History     Tobacco Use    Smoking status: Never    Smokeless tobacco: Never   Vaping Use    Vaping status: Never Used   Substance Use Topics    Alcohol use: Yes     Alcohol/week: 4.0 standard drinks of alcohol     Types: 1 Glasses of wine, 1 Cans of beer, 2 Shots of liquor per week      "Comment: daily    Drug use: Never          Objective   Physical Exam  Vitals:    08/23/24 1059   BP: 128/80   Pulse: 71   Temp: 97.3 °F (36.3 °C)   TempSrc: Infrared   SpO2: 97%   Weight: 78.5 kg (173 lb)   Height: 177.8 cm (70\")     Body mass index is 24.82 kg/m².    Constitutional: NAD.  Cardiovascular: Irregularly irregular with an estimated heart rate ~110. No murmurs.  Trace ankle LE edema b/l. Radial pulses 1+ bilaterally.  Pulmonary: Reduced aeration of both lower lobes with crackles audible on the left lower lobe.  No wheezing.  Good effort.  Integumentary: No rashes or wounds on face or upper extremities.  Psychiatric: Normal affect. Normal thought content.     Assessment & Plan   Assessment and Plan  Pleasant 83-year-old male with seizure disorder, paroxysmal atrial fibrillation with watchman device and recurrent RVR, heart failure with preserved ejection fraction and bilateral pleural effusions, ascending aortic aneurysm, resistant hypertension, hyperlipidemia, history of prediabetes, vitamin D deficiency, insomnia, among other problems, who presents for the following:     Diagnoses and all orders for this visit:    1. Bilateral pleural effusion (Primary): Recent increase in bilateral pleural effusions I do agree with thoracentesis, will also increase his bumetanide to 1 mg daily.  Will get labs as below to evaluate for CHF, or infectious contributor.  He is in RVR today with normotension, he is already on high-dose beta-blockade and calcium channel blocker.  I have asked them to make an appoint with a cardiologist to discuss this RVR over the next week.  If it has been persistent then this may explain worsening pleural effusions, shortness of breath and dizziness.  I did discuss reasons to go to the hospital at [facility].  -     CBC & Differential  -     Comprehensive Metabolic Panel  -     Procalcitonin  -     BNP ([facility] Only)    2. Atrial fibrillation with RVR  -     CBC & Differential  -     " Comprehensive Metabolic Panel  -     Magnesium  -     Procalcitonin  -     BNP ([facility] Only)    3. Prediabetes  -     Hemoglobin A1c    4. Hyperlipidemia, unspecified hyperlipidemia type  -     TSH  -     T4, Free  -     T3, Free  -     Lipid Panel    5. Vitamin D deficiency  -     Vitamin D,25-Hydroxy    6. Chronic heart failure with preserved ejection fraction  -     BNP ([facility] Only)    Tomas Lopez MD  [facility]  O: [phone]    Disclaimer: Parts of this note were dictated by speech recognition. Minor errors in transcription may be present. Please call if questions.

## 2024-08-23 NOTE — TELEPHONE ENCOUNTER
Advised pts wife that as long as not having chest pain or palpitations can wait till see's cardiology.

## 2024-08-26 LAB
25(OH)D3+25(OH)D2 SERPL-MCNC: 78.9 NG/ML (ref 30–100)
ALBUMIN SERPL-MCNC: 4.2 G/DL (ref 3.7–4.7)
ALP SERPL-CCNC: 152 IU/L (ref 44–121)
ALT SERPL-CCNC: 29 IU/L (ref 0–44)
AST SERPL-CCNC: 50 IU/L (ref 0–40)
BASOPHILS # BLD AUTO: 0.1 X10E3/UL (ref 0–0.2)
BASOPHILS NFR BLD AUTO: 1 %
BILIRUB SERPL-MCNC: 1.1 MG/DL (ref 0–1.2)
BNP SERPL-MCNC: 685.5 PG/ML (ref 0–100)
BUN SERPL-MCNC: 25 MG/DL (ref 8–27)
BUN/CREAT SERPL: 20 (ref 10–24)
CALCIUM SERPL-MCNC: 9.6 MG/DL (ref 8.6–10.2)
CHLORIDE SERPL-SCNC: 96 MMOL/L (ref 96–106)
CHOLEST SERPL-MCNC: 122 MG/DL (ref 100–199)
CO2 SERPL-SCNC: 24 MMOL/L (ref 20–29)
CREAT SERPL-MCNC: 1.28 MG/DL (ref 0.76–1.27)
EGFRCR SERPLBLD CKD-EPI 2021: 56 ML/MIN/1.73
EOSINOPHIL # BLD AUTO: 0.1 X10E3/UL (ref 0–0.4)
EOSINOPHIL NFR BLD AUTO: 1 %
ERYTHROCYTE [DISTWIDTH] IN BLOOD BY AUTOMATED COUNT: 12.3 % (ref 11.6–15.4)
GLOBULIN SER CALC-MCNC: 2.7 G/DL (ref 1.5–4.5)
GLUCOSE SERPL-MCNC: 169 MG/DL (ref 70–99)
HBA1C MFR BLD: 6.7 % (ref 4.8–5.6)
HCT VFR BLD AUTO: 41 % (ref 37.5–51)
HDLC SERPL-MCNC: 44 MG/DL
HGB BLD-MCNC: 13.5 G/DL (ref 13–17.7)
IMM GRANULOCYTES # BLD AUTO: 0 X10E3/UL (ref 0–0.1)
IMM GRANULOCYTES NFR BLD AUTO: 0 %
LDLC SERPL CALC-MCNC: 66 MG/DL (ref 0–99)
LYMPHOCYTES # BLD AUTO: 0.5 X10E3/UL (ref 0.7–3.1)
LYMPHOCYTES NFR BLD AUTO: 7 %
MAGNESIUM SERPL-MCNC: 2 MG/DL (ref 1.6–2.3)
MCH RBC QN AUTO: 33.9 PG (ref 26.6–33)
MCHC RBC AUTO-ENTMCNC: 32.9 G/DL (ref 31.5–35.7)
MCV RBC AUTO: 103 FL (ref 79–97)
MONOCYTES # BLD AUTO: 0.5 X10E3/UL (ref 0.1–0.9)
MONOCYTES NFR BLD AUTO: 7 %
NEUTROPHILS # BLD AUTO: 5.8 X10E3/UL (ref 1.4–7)
NEUTROPHILS NFR BLD AUTO: 84 %
PLATELET # BLD AUTO: 247 X10E3/UL (ref 150–450)
POTASSIUM SERPL-SCNC: 4.3 MMOL/L (ref 3.5–5.2)
PROCALCITONIN SERPL-MCNC: 0.02 NG/ML (ref 0–0.08)
PROT SERPL-MCNC: 6.9 G/DL (ref 6–8.5)
RBC # BLD AUTO: 3.98 X10E6/UL (ref 4.14–5.8)
SODIUM SERPL-SCNC: 136 MMOL/L (ref 134–144)
T3FREE SERPL-MCNC: 2.8 PG/ML (ref 2–4.4)
T4 FREE SERPL-MCNC: 1.39 NG/DL (ref 0.82–1.77)
TRIGL SERPL-MCNC: 53 MG/DL (ref 0–149)
TSH SERPL DL<=0.005 MIU/L-ACNC: 2.56 UIU/ML (ref 0.45–4.5)
VLDLC SERPL CALC-MCNC: 12 MG/DL (ref 5–40)
WBC # BLD AUTO: 6.9 X10E3/UL (ref 3.4–10.8)

## 2024-08-26 NOTE — PROGRESS NOTES
Heart failure markers are elevated, so your symptoms should improve with increasing the Bumex as we discussed    Infectious markers are negative suggesting this is not a pneumonia    Blood sugars are up but in this range it is a long-term problem and I do not recommend medications yet unless A1c is in excess of 7.5%

## 2024-08-27 ENCOUNTER — TELEPHONE (OUTPATIENT)
Dept: INTERNAL MEDICINE | Facility: CLINIC | Age: 84
End: 2024-08-27
Payer: MEDICARE

## 2024-08-27 NOTE — TELEPHONE ENCOUNTER
Caller: Travis Boo    Relationship: Emergency Contact    Best call back number: [phone]       What test was performed: LABS    When was the test performed: 8-23-24    Where was the test performed: IN OUR OFFICE    Additional notes: REQUESTING THAT LAB RESULTS BE SENT TO [facility].   TELEPHONE NUMBER: [phone]  DR. VEGA    ALSO PLEASE SEND TO  TELEPHONE NUMBER: [phone]  DR. MCGUIRE    ALSO, PATIENTS WIFE WOULD LIKE A PHONE CALL TO ASK ABOUT SPECIFIC LABS.    THANK YOU.

## 2024-09-05 ENCOUNTER — READMISSION MANAGEMENT (OUTPATIENT)
Dept: CALL CENTER | Facility: HOSPITAL | Age: 84
End: 2024-09-05
Payer: MEDICARE

## 2024-09-05 ENCOUNTER — TELEPHONE (OUTPATIENT)
Dept: INTERNAL MEDICINE | Facility: CLINIC | Age: 84
End: 2024-09-05

## 2024-09-05 NOTE — TELEPHONE ENCOUNTER
Caller: Travis Boo    Relationship to patient: Emergency Contact    Best call back number: [phone]    New or established patient?  [] New  [x] Established    Date of discharge: 09/05/2024    Facility discharged from: [facility]    Diagnosis/Symptoms: GASH ON HEAD    Length of stay (If applicable):     Specialty Only: Did you see a [facility] health provider?    [] Yes  [x] No  If so, who?       PATIENT SCHEDULED FOR TUESDAY NEXT WEEK.  STILL IN [place] UNTIL SATURDAY.

## 2024-09-06 ENCOUNTER — TRANSITIONAL CARE MANAGEMENT TELEPHONE ENCOUNTER (OUTPATIENT)
Dept: CALL CENTER | Facility: HOSPITAL | Age: 84
End: 2024-09-06
Payer: MEDICARE

## 2024-09-06 NOTE — OUTREACH NOTE
Prep Survey      Flowsheet Row Responses   [facility] facility patient discharged from? Non-BH   Is LACE score < 7 ? Non-BH Discharge   Eligibility [facility]   Date of Admission 09/02/24   Date of Discharge 09/05/24   Discharge Disposition Home or Self Care   Discharge diagnosis Subdural hematoma (HCC) (Primary Dx),  Scalp laceration, initial encounter   Does the patient have one of the following disease processes/diagnoses(primary or secondary)? Other   Does the patient have Home health ordered? No   Is there a DME ordered? No   Prep survey completed? Yes            MIAH EASLEY - Registered Nurse

## 2024-09-06 NOTE — OUTREACH NOTE
Call Center TCM Note      Flowsheet Row Responses   [facility] patient discharged from? Non-BH   Does the patient have one of the following disease processes/diagnoses(primary or secondary)? Other   TCM attempt successful? No   Unsuccessful attempts Attempt 2            Maggi Tucker RN    9/6/2024, 14:22 EDT

## 2024-09-06 NOTE — OUTREACH NOTE
Call Center TCM Note      Flowsheet Row Responses   [facility] patient discharged from? Non-BH   Does the patient have one of the following disease processes/diagnoses(primary or secondary)? Other   TCM attempt successful? No   Unsuccessful attempts Attempt 1            Maggi Tucker RN    9/6/2024, 11:14 EDT

## 2024-09-07 ENCOUNTER — TRANSITIONAL CARE MANAGEMENT TELEPHONE ENCOUNTER (OUTPATIENT)
Dept: CALL CENTER | Facility: HOSPITAL | Age: 84
End: 2024-09-07
Payer: MEDICARE

## 2024-09-07 NOTE — OUTREACH NOTE
Call Center TCM Note      Flowsheet Row Responses   [facility] patient discharged from? Non-   Does the patient have one of the following disease processes/diagnoses(primary or secondary)? Other   TCM attempt successful? Yes   Call start time 0941   Call end time 0944   Discharge diagnosis Subdural hematoma (HCC) (Primary Dx),  Scalp laceration, initial encounter   Person spoke with today (if not patient) and relationship Travis/spouse   Meds reviewed with patient/caregiver? Yes   Is the patient having any side effects they believe may be caused by any medication additions or changes? No   Does the patient have all medications ordered at discharge? N/A   Is the patient taking all medications as directed (includes completed medication regime)? Yes   Comments Hospital f/u appt. with PCP 9/10/24 @ 2:15pm   Does the patient have an appointment with their PCP within 7-14 days of discharge? Yes   What is the Home health agency?  [facility]   Psychosocial issues? No   Did the patient receive a copy of their discharge instructions? Yes   Nursing interventions Reviewed instructions with patient   What is the patient's perception of their health status since discharge? Improving   Is the patient/caregiver able to teach back signs and symptoms related to disease process for when to call PCP? Yes   Is the patient/caregiver able to teach back signs and symptoms related to disease process for when to call 911? Yes   Is the patient/caregiver able to teach back the hierarchy of who to call/visit for symptoms/problems? PCP, Specialist, Home health nurse, Urgent Care, ED, 911 Yes   If the patient is a current smoker, are they able to teach back resources for cessation? Not a smoker   TCM call completed? Yes   Call end time 0944   Would this patient benefit from a Referral to [facility] Social Work? No   Is the patient interested in additional calls from an ambulatory ? No            Olivia Casanova RN    9/7/2024, 09:45  EDT

## 2024-09-10 ENCOUNTER — OFFICE VISIT (OUTPATIENT)
Dept: INTERNAL MEDICINE | Facility: CLINIC | Age: 84
End: 2024-09-10
Payer: MEDICARE

## 2024-09-10 VITALS
HEIGHT: 70 IN | BODY MASS INDEX: 23.88 KG/M2 | DIASTOLIC BLOOD PRESSURE: 82 MMHG | TEMPERATURE: 97.7 F | OXYGEN SATURATION: 94 % | WEIGHT: 166.8 LBS | SYSTOLIC BLOOD PRESSURE: 124 MMHG | HEART RATE: 53 BPM

## 2024-09-10 DIAGNOSIS — I62.03 CHRONIC SUBDURAL HEMATOMA: Primary | ICD-10-CM

## 2024-09-10 DIAGNOSIS — R53.81 PHYSICAL DECONDITIONING: ICD-10-CM

## 2024-09-10 DIAGNOSIS — J90 BILATERAL PLEURAL EFFUSION: ICD-10-CM

## 2024-09-10 DIAGNOSIS — R26.89 BALANCE DISORDER: ICD-10-CM

## 2024-09-10 DIAGNOSIS — R29.898 WEAKNESS OF BOTH LOWER EXTREMITIES: ICD-10-CM

## 2024-09-10 DIAGNOSIS — E53.8 B12 DEFICIENCY: ICD-10-CM

## 2024-09-10 PROCEDURE — 99215 OFFICE O/P EST HI 40 MIN: CPT | Performed by: FAMILY MEDICINE

## 2024-09-10 PROCEDURE — 3074F SYST BP LT 130 MM HG: CPT | Performed by: FAMILY MEDICINE

## 2024-09-10 PROCEDURE — 3079F DIAST BP 80-89 MM HG: CPT | Performed by: FAMILY MEDICINE

## 2024-09-10 RX ORDER — CYANOCOBALAMIN 1000 UG/ML
1000 INJECTION, SOLUTION INTRAMUSCULAR; SUBCUTANEOUS
Status: SHIPPED | OUTPATIENT
Start: 2024-09-10

## 2024-09-10 RX ORDER — METOPROLOL SUCCINATE 100 MG/1
100 TABLET, EXTENDED RELEASE ORAL DAILY
Status: SHIPPED | COMMUNITY
Start: 2024-09-10

## 2024-09-10 NOTE — PROGRESS NOTES
Date of Encounter: 09/10/2024  Patient: Gabo Boo,  1940    Subjective   History of Presenting Illness  Chief complaint: Hospital follow-up    Patient was admitted to [facility]  -  after a fall in his hotel room after feeling unwell overnight. He was noted to have a 9.5 mm hyperdense subdural hematoma around the right cerebral convexity, 5.5 mm mixed density subdural hematoma along the left frontoparietal convexity, 2 mm midline shift to the left.  This was on CT.  There is no MRI follow-up.  Neurology felt that these were old subdural hematomas but regardless offered him an MMA embolization which he declined.  He did have a scalp laceration with subcutaneous emphysema and hematoma that was closed with sutures.  CT of the neck was unremarkable.     He was started on Keppra 500 mg twice daily acutely for seizure prophylaxis in addition to his Vimpat.  Keppra was discontinued at discharge.  There were no other medication changes.  He was recommended to follow-up with neurology appear for consideration of MMA embolism if indicated    On  he did have his pleural effusion drained which improved his symptoms significantly.  He has had a little bit more shortness of breath over the past week but has not been using his CPAP when he was in [place].  Plans to restart this.  RVR has resolved and they did add diltiazem to his regimen, he does feel chronically fatigued.    We discussed potential strokelike symptoms, memory difficulties, and speech difficulties and none of these have been noted by the patient or his wife    The following portions of the patient's history were reviewed and updated as appropriate: allergies, current medications, past family history, past medical history, past social history, past surgical history and problem list.    Patient Active Problem List   Diagnosis    Anxiety    Hyperlipidemia    Resistant hypertension    Insomnia    Prediabetes    Focal epilepsy    Hygroma     Seizure    Partial seizures    Vitamin D deficiency    H/O lead exposure    Paroxysmal atrial fibrillation    Hx of adenomatous polyp of colon    Presence of Watchman left atrial appendage closure device    Ascending aortic aneurysm    Tricuspid regurgitation    Chronic heart failure with preserved ejection fraction    Atrial fibrillation with RVR    Bilateral pleural effusion    TIFFANY (obstructive sleep apnea) on CPAP    On supplemental oxygen therapy at night    Bilateral carotid artery disease    Gynecomastia     Past Medical History:   Diagnosis Date    Abnormal barium swallow 07/08/2016    HH, reflux    AF (paroxysmal atrial fibrillation)     Anemia 03/21/2021    Anxiety     Bilateral lower extremity edema     CHF (congestive heart failure) 6-2023    Coronary artery disease     GERD (gastroesophageal reflux disease)     H/O cataract     Dr Heron Hoffman    Hernia, epigastric 10/14/2020    Added automatically from request for surgery [id number]    Hiatal hernia     Hyperlipidemia     Hypertension     Internal hemorrhoids     Mood disorder 07/27/2017    PONV (postoperative nausea and vomiting)     Seizures     July 2013 was last seizure    Tubular adenoma of colon     Umbilical hernia without obstruction and without gangrene 10/14/2020    Added automatically from request for surgery [id number]     Past Surgical History:   Procedure Laterality Date    CARDIAC SURGERY      CATARACT EXTRACTION Bilateral     COLONOSCOPY  04/23/2013    Dr Galeano/Maddison, Ih, tubular adenoma w/low grade dysplasia    COLONOSCOPY N/A 11/03/2020    Procedure: COLONOSCOPY;  Surgeon: Gabo Power MD;  Location: [facility] MAIN OR;  Service: General;  Laterality: N/A;    ENDOSCOPY N/A 12/16/2016    Procedure: ESOPHAGOGASTRODUODENOSCOPY WITH COLD BIOPSIES AND 54 FR KENNY DILATATION;  Surgeon: Shiraz Galeano MD;  Location: [facility] ENDOSCOPY;  Service:     INGUINAL HERNIA REPAIR      as a child and again in 1970 approx.    KRISTYN Left 03/2019    Left  Atrial Appendage Occlusion W KRISTYN    TONSILLECTOMY      VENTRAL/INCISIONAL HERNIA REPAIR N/A 11/03/2020    Procedure: VENTRAL HERNIA REPAIR WITH MESH;  Surgeon: Gabo Power MD;  Location: [facility] MAIN OR;  Service: General;  Laterality: N/A;    WRIST SURGERY Right      Family History   Problem Relation Age of Onset    Stroke Father     COPD Father     Thyroid disease Daughter     Thyroid disease Daughter     Malig Hyperthermia Neg Hx        Current Outpatient Medications:     Astaxanthin 4 MG capsule, Take  by mouth., Disp: , Rfl:     B Complex Vitamins (VITAMIN B COMPLEX) capsule capsule, Take 1 capsule by mouth Daily. Indications: SUPPLEMENT, Disp: , Rfl:     bumetanide (BUMEX) 1 MG tablet, Take 1 tablet by mouth Every Other Day., Disp: , Rfl:     Coenzyme Q10 (CoQ10) 100 MG capsule, Take 1 capsule by mouth 2 (Two) Times a Day. WITH OBQUINOL IN IT.   Indications: heart failure, Disp: , Rfl:     dilTIAZem CD (CARDIZEM CD) 120 MG 24 hr capsule, Take 1 capsule by mouth Daily. Indications: a. fib., Disp: , Rfl:     eplerenone (INSPRA) 25 MG tablet, Take 1 tablet by mouth Daily., Disp: 90 tablet, Rfl: 3    lacosamide (Vimpat) 200 MG tablet, Take 1 tablet by mouth 2 (Two) Times a Day. TAKES BRAND VIMPAT  Indications: Seizures that are Not Controlled, Disp: , Rfl:     Lactobacillus (PROBIOTIC ACIDOPHILUS PO), Take 1 tablet by mouth Daily. Indications: general wellness, Disp: , Rfl:     losartan (COZAAR) 100 MG tablet, Take 1 tablet by mouth Daily., Disp: , Rfl:     Melatonin 3 MG tablet dispersible, Place  on the tongue. Indications: SLEEP, Disp: , Rfl:     metoprolol succinate XL (TOPROL-XL) 100 MG 24 hr tablet, Take 1 tablet by mouth Daily., Disp: , Rfl:     Multiple Vitamin (MULTIVITAMIN PO), Take 1 tablet by mouth Daily. Indications: SUPPLEMENT, Disp: , Rfl:     Nattokinase 100 MG capsule, Take 200 mg by mouth Daily. Indications: general wellness, Disp: , Rfl:     Omega-3 Fatty Acids (OMEGA-3 FISH OIL PO), Take 1  "capsule by mouth Daily. Indications: general wellness, Disp: , Rfl:     Vitamin D-Vitamin K (D3 + K2 Dots) 1000-90 UNIT-MCG tablet, Take 1 tablet/day by mouth Daily. HOLD ALL SUPPLEMENTS FOR SURGERY   Indications: SUPPLEMENT, Disp: , Rfl:     Current Facility-Administered Medications:     cyanocobalamin injection 1,000 mcg, 1,000 mcg, Intramuscular, Q28 Days, Tomas Lopez MD  No Known Allergies  Social History     Tobacco Use    Smoking status: Never    Smokeless tobacco: Never   Vaping Use    Vaping status: Never Used   Substance Use Topics    Alcohol use: Yes     Alcohol/week: 4.0 standard drinks of alcohol     Types: 1 Glasses of wine, 1 Cans of beer, 2 Shots of liquor per week     Comment: daily    Drug use: Never          Objective   Physical Exam  Vitals:    09/10/24 1413   BP: 124/82   Pulse: 53   Temp: 97.7 °F (36.5 °C)   TempSrc: Infrared   SpO2: 94%   Weight: 75.7 kg (166 lb 12.8 oz)   Height: 177.8 cm (70\")     Body mass index is 23.93 kg/m².    Constitutional: NAD.  Cardiovascular: Irregularly irregular. No murmurs. No LE edema b/l. Radial pulses 2+ bilaterally.  Pulmonary: Crackles in both bases that diminish rapidly up the lung field.  No wheezing or coarseness.  Good effort and aeration otherwise.  Integumentary: Scalp laceration C/D/I, sutures in place, no discharge or erythema.  Nontender.  Psychiatric: Normal affect. Normal thought content.  Neurologic: Cranial nerves intact.  Normal speech pattern.  Normal eye contact.     Assessment & Plan   Assessment and Plan  Pleasant 84-year-old male with seizure disorder, paroxysmal atrial fibrillation with watchman device and recurrent RVR, heart failure with preserved ejection fraction and bilateral pleural effusions, ascending aortic aneurysm, resistant hypertension, hyperlipidemia, history of prediabetes, vitamin D deficiency, insomnia, among other problems, who presents for the following:     Diagnoses and all orders for this visit:    1. Chronic " subdural hematoma (Primary): Does not have the diagnosis of chronic subdural hematoma previously, he does not have any symptoms of this today.  I reviewed previous MRI imaging from earlier this year and do not note any subdural hematomas.  I would like to arrange a follow-up MRI to evaluate for chronic subdural hematomas based upon the recommendation for MMA embolization by the neurosurgeon.  Patient does have follow-up with his neurologist scheduled, although it is later this year.  He does not have any symptoms of chronic subdural at this time  -     Cancel: MRI Brain With & Without Contrast; Future  -     MRI Brain With & Without Contrast; Future    2. Bilateral pleural effusion: Improved since thoracentesis and resolution of RVR, will get chest x-ray for follow-up  -     XR Chest 2 View    3. Physical deconditioning  -     Ambulatory Referral to Physical Therapy for Evaluation & Treatment    4. Weakness of both lower extremities  -     Ambulatory Referral to Physical Therapy for Evaluation & Treatment    5. Balance disorder  -     Ambulatory Referral to Physical Therapy for Evaluation & Treatment    6. B12 deficiency: They asked for empiric B12 which is reasonable  -     cyanocobalamin injection 1,000 mcg    7.  Atrial fibrillation with RVR: Now that diltiazem has been added okay to reduce metoprolol to 100 mg daily.    BMI is within normal parameters. No other follow-up for BMI required.    Will see him Friday or Monday for suture removal    I spent 60 minutes reviewing the patient's chart and face-to-face with him in the office    Tomas Lopez MD  Family Medicine  O: [phone]    Disclaimer: Parts of this note were dictated by speech recognition. Minor errors in transcription may be present. Please call if questions.

## 2024-09-12 ENCOUNTER — HOSPITAL ENCOUNTER (OUTPATIENT)
Facility: HOSPITAL | Age: 84
Discharge: HOME | End: 2024-09-12
Admitting: FAMILY MEDICINE
Payer: MEDICARE

## 2024-09-12 PROCEDURE — 71046 X-RAY EXAM CHEST 2 VIEWS: CPT

## 2024-09-15 DIAGNOSIS — J90 BILATERAL PLEURAL EFFUSION: Primary | ICD-10-CM

## 2024-09-15 NOTE — PROGRESS NOTES
This pleural effusion is slightly worsened from when it was last evaluated. There is concern that one of the areas of fluid may represent a mass. The best way to determine this is a follow up chest CT. I am going to arrange this. Please keep following with pulmonology.

## 2024-09-16 ENCOUNTER — OFFICE VISIT (OUTPATIENT)
Dept: INTERNAL MEDICINE | Facility: CLINIC | Age: 84
End: 2024-09-16
Payer: MEDICARE

## 2024-09-16 VITALS
DIASTOLIC BLOOD PRESSURE: 82 MMHG | OXYGEN SATURATION: 98 % | TEMPERATURE: 97.5 F | SYSTOLIC BLOOD PRESSURE: 114 MMHG | HEART RATE: 62 BPM

## 2024-09-16 DIAGNOSIS — Z48.02 VISIT FOR SUTURE REMOVAL: Primary | ICD-10-CM

## 2024-09-16 DIAGNOSIS — I62.03 CHRONIC SUBDURAL HEMATOMA: ICD-10-CM

## 2024-09-16 DIAGNOSIS — I1A.0 RESISTANT HYPERTENSION: ICD-10-CM

## 2024-09-16 DIAGNOSIS — J90 BILATERAL PLEURAL EFFUSION: ICD-10-CM

## 2024-09-16 PROCEDURE — 3079F DIAST BP 80-89 MM HG: CPT | Performed by: FAMILY MEDICINE

## 2024-09-16 PROCEDURE — 99214 OFFICE O/P EST MOD 30 MIN: CPT | Performed by: FAMILY MEDICINE

## 2024-09-16 PROCEDURE — 3074F SYST BP LT 130 MM HG: CPT | Performed by: FAMILY MEDICINE

## 2024-09-16 RX ORDER — LOSARTAN POTASSIUM 100 MG/1
50 TABLET ORAL DAILY
Status: SHIPPED | COMMUNITY
Start: 2024-09-16 | End: 2024-09-19 | Stop reason: SDUPTHER

## 2024-09-18 ENCOUNTER — TELEPHONE (OUTPATIENT)
Dept: INTERNAL MEDICINE | Facility: CLINIC | Age: 84
End: 2024-09-18
Payer: MEDICARE

## 2024-09-18 DIAGNOSIS — I1A.0 RESISTANT HYPERTENSION: Primary | ICD-10-CM

## 2024-09-19 RX ORDER — LOSARTAN POTASSIUM 50 MG/1
50 TABLET ORAL DAILY
Qty: 90 TABLET | Refills: 3 | Status: SHIPPED | OUTPATIENT
Start: 2024-09-19

## 2024-09-23 ENCOUNTER — READMISSION MANAGEMENT (OUTPATIENT)
Dept: CALL CENTER | Facility: HOSPITAL | Age: 84
End: 2024-09-23
Payer: MEDICARE

## 2024-10-01 ENCOUNTER — TRANSCRIBE ORDERS (OUTPATIENT)
Dept: HOME HEALTH SERVICES | Facility: HOME HEALTHCARE | Age: 84
End: 2024-10-01
Payer: MEDICARE

## 2024-10-01 ENCOUNTER — HOME HEALTH ADMISSION (OUTPATIENT)
Dept: HOME HEALTH SERVICES | Facility: HOME HEALTHCARE | Age: 84
End: 2024-10-01
Payer: MEDICARE

## 2024-10-01 DIAGNOSIS — I95.1 ORTHOSTATIC HYPOTENSION: ICD-10-CM

## 2024-10-01 DIAGNOSIS — I5A NONISCHEMIC NONTRAUMATIC MYOCARDIAL INJURY: Primary | ICD-10-CM

## 2024-10-02 ENCOUNTER — HOME CARE VISIT (OUTPATIENT)
Dept: HOME HEALTH SERVICES | Facility: HOME HEALTHCARE | Age: 84
End: 2024-10-02
Payer: MEDICARE

## 2024-10-02 VITALS
OXYGEN SATURATION: 95 % | SYSTOLIC BLOOD PRESSURE: 110 MMHG | TEMPERATURE: 97 F | DIASTOLIC BLOOD PRESSURE: 76 MMHG | HEART RATE: 75 BPM | RESPIRATION RATE: 18 BRPM

## 2024-10-02 PROCEDURE — G0151 HHCP-SERV OF PT,EA 15 MIN: HCPCS

## 2024-10-02 NOTE — HOME HEALTH
"SOC Note:     Home Health ordered for: disciplines PT 1w1, 2w3    Reason for Hosp/Primary Dx/Co-morbidities: at [facility] [id number] with orthostatic hypotension with dehyrdation and hx of CHF/a-fib, then went to [facility] [id number]; month ago had fallen on vacation getting out of bed with hospital in [place], [place] with head laceration which he reported has healed    Focus of Care: Skilled PT is needed for recovery from orthostatic hypotension with dehyration with weakness/balance issues, so needs gait training progressing to least device/no device as balance allows, stair training in out of home and to basement, HEP upgrade instruction for strengthening and balance activities, and patient spouse education for medication knowledge, risk for skin breakdown, emergency planning, and home safety, in order to return to independent household and then community mobility with devices if needed.    Patient's goal(s): \"I want to get back to walking without a device.\"    Current Functional status/mobility/DME: supervised SBA with rolling walker in home; DME: rolling walker and grab bars in shower; also has walking trail poles he will progress to for gait from rolling walker    HB status/Living Arrangements: limited gait and stairs due to weakness so needs assist out of home currently    Skin Integrity/wound status: no issues; has healing scabbed area on left arm    Code Status: full    Fall Risk/Safety concerns: none    Educated on Emergency Plan, steps to take prior to going to the ER and when to Call Home Health First:  yes with patient/spouse understanding     Medication issues/Concerns: none    Additional Problems/Concerns: none    SDOH Barriers (i.e. caregiver concerns, social isolation, transportation, food insecurity, environment, income etc.)/Need for MSW: none"

## 2024-10-03 ENCOUNTER — HOME CARE VISIT (OUTPATIENT)
Dept: HOME HEALTH SERVICES | Facility: HOME HEALTHCARE | Age: 84
End: 2024-10-03
Payer: MEDICARE

## 2024-10-08 ENCOUNTER — HOME CARE VISIT (OUTPATIENT)
Dept: HOME HEALTH SERVICES | Facility: HOME HEALTHCARE | Age: 84
End: 2024-10-08
Payer: MEDICARE

## 2024-10-08 VITALS
TEMPERATURE: 96.5 F | SYSTOLIC BLOOD PRESSURE: 126 MMHG | RESPIRATION RATE: 18 BRPM | HEART RATE: 75 BPM | OXYGEN SATURATION: 96 % | DIASTOLIC BLOOD PRESSURE: 80 MMHG

## 2024-10-08 PROCEDURE — G0151 HHCP-SERV OF PT,EA 15 MIN: HCPCS

## 2024-10-09 ENCOUNTER — TELEPHONE (OUTPATIENT)
Dept: INTERNAL MEDICINE | Facility: CLINIC | Age: 84
End: 2024-10-09
Payer: MEDICARE

## 2024-10-09 NOTE — TELEPHONE ENCOUNTER
Caller: Travis Boo    Relationship: Emergency Contact    Best call back number: [phone]     Who are you requesting to speak with (clinical staff, provider,  specific staff member): CLINICAL STAFF     What was the call regarding: HAS MRI OF BRAIN IN NOVEMBER BUT NEUROLOGIST HAD TO PUSH BACK HIS APPOINTMENT UNTIL JUNE 2025 DOES HE NEED TO GO AHEAD AND GET MRI DONE NOW OR WAIT UNTIL CLOSER TO HIS APPOINTMENT WITH NEUROLOGY

## 2024-10-10 ENCOUNTER — HOME CARE VISIT (OUTPATIENT)
Dept: HOME HEALTH SERVICES | Facility: HOME HEALTHCARE | Age: 84
End: 2024-10-10
Payer: MEDICARE

## 2024-10-10 VITALS
RESPIRATION RATE: 18 BRPM | DIASTOLIC BLOOD PRESSURE: 70 MMHG | OXYGEN SATURATION: 97 % | TEMPERATURE: 97 F | SYSTOLIC BLOOD PRESSURE: 118 MMHG | HEART RATE: 76 BPM

## 2024-10-10 PROCEDURE — G0151 HHCP-SERV OF PT,EA 15 MIN: HCPCS

## 2024-10-11 ENCOUNTER — HOSPITAL ENCOUNTER (OUTPATIENT)
Facility: HOSPITAL | Age: 84
Discharge: HOME | End: 2024-10-11
Payer: MEDICARE

## 2024-10-11 DIAGNOSIS — J90 BILATERAL PLEURAL EFFUSION: ICD-10-CM

## 2024-10-11 PROCEDURE — 25510000001 IOPAMIDOL 61 % SOLUTION: Performed by: FAMILY MEDICINE

## 2024-10-11 PROCEDURE — 71260 CT THORAX DX C+: CPT

## 2024-10-11 RX ORDER — IOPAMIDOL 612 MG/ML
100 INJECTION, SOLUTION INTRAVASCULAR
Status: COMPLETED | OUTPATIENT
Start: 2024-10-11 | End: 2024-10-11

## 2024-10-11 RX ADMIN — IOPAMIDOL 75 ML: 612 INJECTION, SOLUTION INTRAVENOUS at 15:29

## 2024-10-15 ENCOUNTER — HOME CARE VISIT (OUTPATIENT)
Dept: HOME HEALTH SERVICES | Facility: HOME HEALTHCARE | Age: 84
End: 2024-10-15
Payer: MEDICARE

## 2024-10-15 VITALS
TEMPERATURE: 96 F | HEART RATE: 74 BPM | OXYGEN SATURATION: 97 % | RESPIRATION RATE: 18 BRPM | SYSTOLIC BLOOD PRESSURE: 122 MMHG | DIASTOLIC BLOOD PRESSURE: 70 MMHG

## 2024-10-15 PROCEDURE — G0151 HHCP-SERV OF PT,EA 15 MIN: HCPCS

## 2024-10-16 DIAGNOSIS — I62.03 CHRONIC SUBDURAL HEMATOMA: ICD-10-CM

## 2024-10-16 DIAGNOSIS — I26.99 PULMONARY EMBOLISM WITHOUT ACUTE COR PULMONALE, UNSPECIFIED CHRONICITY, UNSPECIFIED PULMONARY EMBOLISM TYPE: Primary | ICD-10-CM

## 2024-10-17 ENCOUNTER — HOME CARE VISIT (OUTPATIENT)
Dept: HOME HEALTH SERVICES | Facility: HOME HEALTHCARE | Age: 84
End: 2024-10-17
Payer: MEDICARE

## 2024-10-17 VITALS
SYSTOLIC BLOOD PRESSURE: 126 MMHG | HEART RATE: 110 BPM | OXYGEN SATURATION: 96 % | RESPIRATION RATE: 18 BRPM | DIASTOLIC BLOOD PRESSURE: 66 MMHG | TEMPERATURE: 97.2 F

## 2024-10-17 PROCEDURE — G0151 HHCP-SERV OF PT,EA 15 MIN: HCPCS

## 2024-10-21 ENCOUNTER — HOME CARE VISIT (OUTPATIENT)
Dept: HOME HEALTH SERVICES | Facility: HOME HEALTHCARE | Age: 84
End: 2024-10-21
Payer: MEDICARE

## 2024-10-21 VITALS
SYSTOLIC BLOOD PRESSURE: 130 MMHG | HEART RATE: 81 BPM | RESPIRATION RATE: 18 BRPM | TEMPERATURE: 97 F | DIASTOLIC BLOOD PRESSURE: 82 MMHG | OXYGEN SATURATION: 98 %

## 2024-10-21 PROCEDURE — G0151 HHCP-SERV OF PT,EA 15 MIN: HCPCS

## 2024-10-22 ENCOUNTER — OFFICE VISIT (OUTPATIENT)
Dept: INTERNAL MEDICINE | Facility: CLINIC | Age: 84
End: 2024-10-22
Payer: MEDICARE

## 2024-10-22 VITALS
SYSTOLIC BLOOD PRESSURE: 140 MMHG | WEIGHT: 162 LBS | DIASTOLIC BLOOD PRESSURE: 84 MMHG | BODY MASS INDEX: 23.19 KG/M2 | OXYGEN SATURATION: 96 % | HEIGHT: 70 IN | HEART RATE: 76 BPM

## 2024-10-22 DIAGNOSIS — J90 BILATERAL PLEURAL EFFUSION: ICD-10-CM

## 2024-10-22 DIAGNOSIS — G47.00 INSOMNIA, UNSPECIFIED TYPE: ICD-10-CM

## 2024-10-22 DIAGNOSIS — I62.03 CHRONIC SUBDURAL HEMATOMA: ICD-10-CM

## 2024-10-22 DIAGNOSIS — I1A.0 RESISTANT HYPERTENSION: Primary | ICD-10-CM

## 2024-10-22 DIAGNOSIS — I26.99 PULMONARY EMBOLISM WITHOUT ACUTE COR PULMONALE, UNSPECIFIED CHRONICITY, UNSPECIFIED PULMONARY EMBOLISM TYPE: ICD-10-CM

## 2024-10-22 DIAGNOSIS — I48.91 ATRIAL FIBRILLATION WITH RVR: ICD-10-CM

## 2024-10-22 RX ORDER — METOPROLOL SUCCINATE 50 MG/1
50 TABLET, EXTENDED RELEASE ORAL DAILY
COMMUNITY

## 2024-10-22 NOTE — PROGRESS NOTES
Date of Encounter: 10/22/2024  Patient: Gabo Boo,  1940    Subjective   History of Presenting Illness  Chief complaint: Follow-up    Patient presents for interval follow-up.  Since our last visit he did have an episode of hypotension requiring hospital visit.  Since that time his blood pressure regimen has been adjusted, he is currently on metoprolol succinate 50 mg daily, holding losartan, holding Bumex and less swelling, and holding diltiazem unless blood pressure elevated.  Today he is 140/84, heart rate has been stable, balance has been steady and he has not had any recent dizziness or fall.  He does ambulate with a walker or a cane and has noticed no issues with balance recently.    Bilateral pleural effusions, left greater than right, stable on recent CT scan.  He did have a thoracentesis in August which did help him symptomatically.  This was arranged through his pulmonologist.  Etiology of his chronic effusion thought to be related to atrial fibrillation, tricuspid regurgitation, and pulmonary hypertension.  Previous thoracentesis did not suggest malignant cause.  He is having increased shortness of breath over the past few days.  No cough or chest pain.    Chronic subdural hematomas, stable, with neurosurgery consult pending.    Recent incidental small pulmonary embolism on CT of the chest.  Lower extremity Dopplers are pending.  We have opted not to anticoagulate him due to chronic subdural hematomas, did discuss IVC filter if lower extremity Dopplers are positive.    Has difficulty falling back asleep at night.  Has used half a tablet of his clonazepam with good effect.  Can only tolerate the CPAP for part of the night.    The following portions of the patient's history were reviewed and updated as appropriate: allergies, current medications, past family history, past medical history, past social history, past surgical history and problem list.    Patient Active Problem List   Diagnosis     Anxiety    Hyperlipidemia    Resistant hypertension    Insomnia    Prediabetes    Focal epilepsy    Hygroma    Seizure    Partial seizures    Vitamin D deficiency    H/O lead exposure    Paroxysmal atrial fibrillation    Hx of adenomatous polyp of colon    Presence of Watchman left atrial appendage closure device    Ascending aortic aneurysm    Tricuspid regurgitation    Chronic heart failure with preserved ejection fraction    Atrial fibrillation with RVR    Bilateral pleural effusion    TIFFANY (obstructive sleep apnea) on CPAP    On supplemental oxygen therapy at night    Bilateral carotid artery disease    Gynecomastia    Pulmonary embolism without acute cor pulmonale    Chronic subdural hematoma     Past Medical History:   Diagnosis Date    Abnormal barium swallow 07/08/2016    HH, reflux    AF (paroxysmal atrial fibrillation)     Anemia 03/21/2021    Anxiety     Bilateral lower extremity edema     CHF (congestive heart failure) 6-2023    Coronary artery disease     GERD (gastroesophageal reflux disease)     H/O cataract     Dr Heron Hoffman    Hernia, epigastric 10/14/2020    Added automatically from request for surgery [id number]    Hiatal hernia     Hyperlipidemia     Hypertension     Internal hemorrhoids     Mood disorder 07/27/2017    PONV (postoperative nausea and vomiting)     Seizures     July 2013 was last seizure    Tubular adenoma of colon     Umbilical hernia without obstruction and without gangrene 10/14/2020    Added automatically from request for surgery [id number]     Past Surgical History:   Procedure Laterality Date    CARDIAC SURGERY      CATARACT EXTRACTION Bilateral     COLONOSCOPY  04/23/2013    Dr Galeano/[facility], tubular adenoma w/low grade dysplasia    COLONOSCOPY N/A 11/03/2020    Procedure: COLONOSCOPY;  Surgeon: Gabo Power MD;  Location: [facility];  Service: General;  Laterality: N/A;    ENDOSCOPY N/A 12/16/2016    Procedure: ESOPHAGOGASTRODUODENOSCOPY WITH COLD BIOPSIES AND  54 FR KENNY DILATATION;  Surgeon: Shiraz Galeano MD;  Location: [facility] ENDOSCOPY;  Service:     INGUINAL HERNIA REPAIR      as a child and again in 1970 approx.    KRISTYN Left 03/2019    Left Atrial Appendage Occlusion W KRISTYN    TONSILLECTOMY      VENTRAL/INCISIONAL HERNIA REPAIR N/A 11/03/2020    Procedure: VENTRAL HERNIA REPAIR WITH MESH;  Surgeon: Gabo Power MD;  Location: [facility] MAIN OR;  Service: General;  Laterality: N/A;    WRIST SURGERY Right      Family History   Problem Relation Age of Onset    Stroke Father     COPD Father     Thyroid disease Daughter     Thyroid disease Daughter     Malig Hyperthermia Neg Hx        Current Outpatient Medications:     Astaxanthin 4 MG capsule, Take  by mouth. Indications: supplement, Disp: , Rfl:     B Complex Vitamins (VITAMIN B COMPLEX) capsule capsule, Take 1 capsule by mouth Daily. Indications: SUPPLEMENT, Disp: , Rfl:     bumetanide (BUMEX) 1 MG tablet, Take 1 tablet by mouth Every Other Day. Indications: Edema, Disp: , Rfl:     Coenzyme Q10 (CoQ10) 100 MG capsule, Take 1 capsule by mouth 2 (Two) Times a Day. WITH OBQUINOL IN IT.   Indications: heart failure, Disp: , Rfl:     dilTIAZem CD (CARDIZEM CD) 120 MG 24 hr capsule, Take 1 capsule by mouth Daily. Indications: a. fib., Disp: , Rfl:     lacosamide (Vimpat) 200 MG tablet, Take 1 tablet by mouth 2 (Two) Times a Day. TAKES BRAND VIMPAT  Indications: Seizures that are Not Controlled, Disp: , Rfl:     Lactobacillus (PROBIOTIC ACIDOPHILUS PO), Take 1 tablet by mouth Daily. Indications: general wellness, Disp: , Rfl:     Melatonin 3 MG tablet dispersible, Place  on the tongue. Indications: SLEEP, Disp: , Rfl:     Multiple Vitamin (MULTIVITAMIN PO), Take 1 tablet by mouth Daily. Indications: SUPPLEMENT, Disp: , Rfl:     Nattokinase 100 MG capsule, Take 200 mg by mouth Daily. Indications: general wellness, Disp: , Rfl:     Omega-3 Fatty Acids (OMEGA-3 FISH OIL PO), Take 1 capsule by mouth Daily. Indications: general  "wellness, Disp: , Rfl:     Vitamin D-Vitamin K (D3 + K2 Dots) 1000-90 UNIT-MCG tablet, Take 1 tablet/day by mouth Daily. HOLD ALL SUPPLEMENTS FOR SURGERY   Indications: SUPPLEMENT, Disp: , Rfl:     metoprolol succinate XL (TOPROL-XL) 50 MG 24 hr tablet, Take 1 tablet by mouth Daily., Disp: , Rfl:     Current Facility-Administered Medications:     cyanocobalamin injection 1,000 mcg, 1,000 mcg, Intramuscular, Q28 Days, Tomas Lopez MD  No Known Allergies  Social History     Tobacco Use    Smoking status: Never    Smokeless tobacco: Never   Vaping Use    Vaping status: Never Used   Substance Use Topics    Alcohol use: Yes     Alcohol/week: 4.0 standard drinks of alcohol     Types: 1 Glasses of wine, 1 Cans of beer, 2 Shots of liquor per week     Comment: daily    Drug use: Never          Objective   Physical Exam  Vitals:    10/22/24 1400   BP: 140/84   Pulse: 76   SpO2: 96%   Weight: 73.5 kg (162 lb)   Height: 177.8 cm (70\")     Body mass index is 23.24 kg/m².    Constitutional: NAD.  Cardiovascular: Irregularly irregular. No murmurs.  Trace LE edema b/l.  No calf tenderness to palpation on either side.  Radial pulses 1+ bilaterally.  Pulmonary: Bilateral diminished breath sounds at the bases due to known pleural effusion, left greater than right.  These go about senior care up his lung fields.  Integumentary: No rashes or wounds on face or upper extremities.  Psychiatric: Normal affect. Normal thought content.     Assessment & Plan   Assessment and Plan  Pleasant 84-year-old male with seizure disorder, paroxysmal atrial fibrillation with watchman device and recurrent RVR, heart failure with preserved ejection fraction and bilateral pleural effusions, ascending aortic aneurysm, resistant hypertension, hyperlipidemia, history of prediabetes, vitamin D deficiency, insomnia, among other problems, who presents for the following:     Diagnoses and all orders for this visit:    1. Resistant hypertension (Primary): Continue to " hold all antihypertensives except for metoprolol, with plan to take diltiazem as needed for elevated blood pressure and make significant lower extremity swelling.  Falls are his biggest risk right now, continue ambulatory aid as needed.    2. Atrial fibrillation with RVR: No recent sustained RVR    3. Bilateral pleural effusion: While these were stable on recent CT he is increasingly symptomatic and he does have retractions and increased work of breathing today.  They plan to go on a plane flight to [place] for a 4-day trip with family soon.  They are chartering a private plane.  I discussed that this is not a 0 risk flight due to his bilateral pleural effusions and other known problems.  Being said, he tolerated this flight well with known pleural effusions back in August.  Because of his increasing symptoms I do want them to call their pulmonologist today and try to arrange a thoracentesis at least a few days ahead of his trip.  This thoracentesis is now persistent and likely secondary to congestive heart failure, but he cannot tolerate diuretics right now due to soft blood pressure.  Continue to follow with pulmonology but may need to discuss longer-term management with something like a Pleurx catheter.  He is to continue to follow with cardiology as well.    4. Pulmonary embolism without acute cor pulmonale, unspecified chronicity, unspecified pulmonary embolism type: Will await lower extremity Dopplers, if positive would consider IVC filter due to chronic subdurals.    5. Chronic subdural hematoma: Stable, neurosurgery consult pending, avoid anticoagulation unless absolutely needed    6. Insomnia, unspecified type: Stable with clonazepam as needed    BMI is within normal parameters. No other follow-up for BMI required.    I spent approximately 60 minutes face-to-face with the patient, reviewing his chart, composing this note    Tomas Lopez MD  Family Medicine  O: [phone]    Disclaimer: Parts of this note  were dictated by speech recognition. Minor errors in transcription may be present. Please call if questions.

## 2024-10-23 ENCOUNTER — TELEPHONE (OUTPATIENT)
Dept: INTERNAL MEDICINE | Facility: CLINIC | Age: 84
End: 2024-10-23

## 2024-10-23 NOTE — TELEPHONE ENCOUNTER
Per Sabrina we cannot order a Urgent Thoracentesis, pt should go to ER if struggling to breathe.    Pts wife aware

## 2024-10-23 NOTE — TELEPHONE ENCOUNTER
PATIENT'S WIFE CAROL CALLED AND STATES THEY CHECKING WITH [facility] IN REGARDS TO PULMONOLOGY. STATES THEY ARE NOT BEING SUPPORTIVE ABOUT A THORACENTESIS.     HIS BREATHING IS MORE LABORED TODAY.     IS THERE A WAY TO GET A THORACENTESIS SCHEDULE IMMEDIATELY WITH [facility] OR [facility]    HE IS FLYING ON 10/31/24    PLEASE CALL AND ADVISE [phone]

## 2024-10-25 ENCOUNTER — HOME CARE VISIT (OUTPATIENT)
Dept: HOME HEALTH SERVICES | Facility: HOME HEALTHCARE | Age: 84
End: 2024-10-25
Payer: MEDICARE

## 2024-10-25 ENCOUNTER — TELEPHONE (OUTPATIENT)
Dept: INTERNAL MEDICINE | Facility: CLINIC | Age: 84
End: 2024-10-25

## 2024-10-25 VITALS
BODY MASS INDEX: 23.05 KG/M2 | RESPIRATION RATE: 18 BRPM | HEIGHT: 70 IN | WEIGHT: 161 LBS | DIASTOLIC BLOOD PRESSURE: 74 MMHG | HEART RATE: 80 BPM | OXYGEN SATURATION: 97 % | TEMPERATURE: 96 F | SYSTOLIC BLOOD PRESSURE: 110 MMHG

## 2024-10-25 PROCEDURE — G0299 HHS/HOSPICE OF RN EA 15 MIN: HCPCS

## 2024-10-25 NOTE — HOME HEALTH
CALLED DR. GONCALVES'S OFFICE (PCP) TO REQUEST HOME HEALTH NURSING SERVICES FOR EDUCATION AND MANAGEMENT OF CHF, MEDICATIONS, FALLS PREVENTION. RECEIVED VERBAL ORDERS FOR HOME HEALTH NURSING SERVICES, SPOKE TO DAVID

## 2024-10-29 ENCOUNTER — HOME CARE VISIT (OUTPATIENT)
Dept: HOME HEALTH SERVICES | Facility: HOME HEALTHCARE | Age: 84
End: 2024-10-29
Payer: MEDICARE

## 2024-10-29 VITALS
TEMPERATURE: 96 F | SYSTOLIC BLOOD PRESSURE: 132 MMHG | OXYGEN SATURATION: 98 % | RESPIRATION RATE: 18 BRPM | DIASTOLIC BLOOD PRESSURE: 84 MMHG | HEART RATE: 54 BPM

## 2024-10-29 PROCEDURE — G0299 HHS/HOSPICE OF RN EA 15 MIN: HCPCS

## 2024-10-30 ENCOUNTER — PATIENT MESSAGE (OUTPATIENT)
Dept: INTERNAL MEDICINE | Facility: CLINIC | Age: 84
End: 2024-10-30
Payer: MEDICARE

## 2024-10-30 DIAGNOSIS — I48.91 ATRIAL FIBRILLATION WITH RVR: Primary | ICD-10-CM

## 2024-10-31 ENCOUNTER — HOME CARE VISIT (OUTPATIENT)
Dept: HOME HEALTH SERVICES | Facility: HOME HEALTHCARE | Age: 84
End: 2024-10-31
Payer: MEDICARE

## 2024-10-31 VITALS
RESPIRATION RATE: 18 BRPM | TEMPERATURE: 97 F | OXYGEN SATURATION: 96 % | DIASTOLIC BLOOD PRESSURE: 76 MMHG | SYSTOLIC BLOOD PRESSURE: 132 MMHG | HEART RATE: 72 BPM

## 2024-10-31 VITALS
OXYGEN SATURATION: 96 % | HEART RATE: 65 BPM | TEMPERATURE: 96.6 F | RESPIRATION RATE: 18 BRPM | SYSTOLIC BLOOD PRESSURE: 132 MMHG | DIASTOLIC BLOOD PRESSURE: 86 MMHG

## 2024-10-31 PROCEDURE — G0300 HHS/HOSPICE OF LPN EA 15 MIN: HCPCS

## 2024-10-31 PROCEDURE — G0151 HHCP-SERV OF PT,EA 15 MIN: HCPCS

## 2024-10-31 NOTE — HOME HEALTH
Patient and spouse counseled extensively about pathophysiology of heart failure and pharmacology of diuretics and other meds used in the treatment of heart failure.  Explained that heart failure will progressively decline over time, but explained the improtance of daily weights and medication in the aid of trying to manage the disease.  Explained lab values and test numbers including BNP, EF% and electrolyte values that are monitored.  Suggested they talk to physician before adding additional electrolytes.  CP assessment WNL.  Weight trending down.  Patient was seen at HF clinic today.  Continue CHF education.

## 2024-11-01 NOTE — HOME HEALTH
since last PT visit on [id number], patient has had 3 cardiology visits with CHF/A-fib reported by spouse and patient has [employer] heart monitor for next 2 weeks; spouse reported he seems to have some balance issues later in day and also 2-3 days ago he was dehydrated so they are making sure he's drinking enough fluids now; patient is weaker and has mild dyspnea with longer walks/exercise sessions since last PT visit; PT had been planning to dc patient as he had been progressing well to cane for gait and family had planned a vacation which they were supposed to leave tomorrow but they had to cancel those plans due to his health decline; PT switched visit from a dc visit to 30 day reassessment and will extend PT 2w3 to continue plan of care with new goals to be added since he did not meet current goals due to medical decline since last PT visit

## 2024-11-04 ENCOUNTER — HOME CARE VISIT (OUTPATIENT)
Dept: HOME HEALTH SERVICES | Facility: HOME HEALTHCARE | Age: 84
End: 2024-11-04
Payer: MEDICARE

## 2024-11-04 VITALS
TEMPERATURE: 97.3 F | DIASTOLIC BLOOD PRESSURE: 72 MMHG | HEART RATE: 66 BPM | SYSTOLIC BLOOD PRESSURE: 118 MMHG | RESPIRATION RATE: 18 BRPM | OXYGEN SATURATION: 95 %

## 2024-11-04 PROCEDURE — G0300 HHS/HOSPICE OF LPN EA 15 MIN: HCPCS

## 2024-11-04 RX ORDER — METOPROLOL SUCCINATE 50 MG/1
50 TABLET, EXTENDED RELEASE ORAL 2 TIMES DAILY
Qty: 180 TABLET | Refills: 3 | Status: SHIPPED | OUTPATIENT
Start: 2024-11-04

## 2024-11-05 ENCOUNTER — HOME CARE VISIT (OUTPATIENT)
Dept: HOME HEALTH SERVICES | Facility: HOME HEALTHCARE | Age: 84
End: 2024-11-05
Payer: MEDICARE

## 2024-11-05 VITALS
SYSTOLIC BLOOD PRESSURE: 122 MMHG | OXYGEN SATURATION: 93 % | TEMPERATURE: 96.8 F | RESPIRATION RATE: 18 BRPM | HEART RATE: 86 BPM | DIASTOLIC BLOOD PRESSURE: 76 MMHG

## 2024-11-05 PROCEDURE — G0151 HHCP-SERV OF PT,EA 15 MIN: HCPCS

## 2024-11-06 ENCOUNTER — HOSPITAL ENCOUNTER (OUTPATIENT)
Dept: CARDIOLOGY | Facility: HOSPITAL | Age: 84
Discharge: HOME | End: 2024-11-06
Payer: MEDICARE

## 2024-11-06 ENCOUNTER — HOSPITAL ENCOUNTER (OUTPATIENT)
Facility: HOSPITAL | Age: 84
Discharge: HOME | End: 2024-11-06
Payer: MEDICARE

## 2024-11-06 DIAGNOSIS — I62.03 CHRONIC SUBDURAL HEMATOMA: ICD-10-CM

## 2024-11-06 DIAGNOSIS — I26.99 PULMONARY EMBOLISM WITHOUT ACUTE COR PULMONALE, UNSPECIFIED CHRONICITY, UNSPECIFIED PULMONARY EMBOLISM TYPE: ICD-10-CM

## 2024-11-06 LAB
BH CV LOW VAS RIGHT POPLITEAL SPONT: 1
BH CV LOWER VASCULAR LEFT COMMON FEMORAL AUGMENT: NORMAL
BH CV LOWER VASCULAR LEFT COMMON FEMORAL COMPETENT: NORMAL
BH CV LOWER VASCULAR LEFT COMMON FEMORAL COMPRESS: NORMAL
BH CV LOWER VASCULAR LEFT COMMON FEMORAL PHASIC: NORMAL
BH CV LOWER VASCULAR LEFT COMMON FEMORAL SPONT: NORMAL
BH CV LOWER VASCULAR LEFT DISTAL FEMORAL COMPRESS: NORMAL
BH CV LOWER VASCULAR LEFT GASTRONEMIUS COMPRESS: NORMAL
BH CV LOWER VASCULAR LEFT GREATER SAPH AK COMPRESS: NORMAL
BH CV LOWER VASCULAR LEFT GREATER SAPH BK COMPRESS: NORMAL
BH CV LOWER VASCULAR LEFT LESSER SAPH COMPRESS: NORMAL
BH CV LOWER VASCULAR LEFT MID FEMORAL AUGMENT: NORMAL
BH CV LOWER VASCULAR LEFT MID FEMORAL COMPETENT: NORMAL
BH CV LOWER VASCULAR LEFT MID FEMORAL COMPRESS: NORMAL
BH CV LOWER VASCULAR LEFT MID FEMORAL PHASIC: NORMAL
BH CV LOWER VASCULAR LEFT MID FEMORAL SPONT: NORMAL
BH CV LOWER VASCULAR LEFT PERONEAL COMPRESS: NORMAL
BH CV LOWER VASCULAR LEFT POPLITEAL AUGMENT: NORMAL
BH CV LOWER VASCULAR LEFT POPLITEAL COMPETENT: NORMAL
BH CV LOWER VASCULAR LEFT POPLITEAL COMPRESS: NORMAL
BH CV LOWER VASCULAR LEFT POPLITEAL PHASIC: NORMAL
BH CV LOWER VASCULAR LEFT POPLITEAL SPONT: NORMAL
BH CV LOWER VASCULAR LEFT POSTERIOR TIBIAL COMPRESS: NORMAL
BH CV LOWER VASCULAR LEFT PROFUNDA FEMORAL COMPRESS: NORMAL
BH CV LOWER VASCULAR LEFT PROXIMAL FEMORAL COMPRESS: NORMAL
BH CV LOWER VASCULAR LEFT SAPHENOFEMORAL JUNCTION COMPRESS: NORMAL
BH CV LOWER VASCULAR RIGHT COMMON FEMORAL AUGMENT: NORMAL
BH CV LOWER VASCULAR RIGHT COMMON FEMORAL COMPETENT: NORMAL
BH CV LOWER VASCULAR RIGHT COMMON FEMORAL COMPRESS: NORMAL
BH CV LOWER VASCULAR RIGHT COMMON FEMORAL PHASIC: NORMAL
BH CV LOWER VASCULAR RIGHT COMMON FEMORAL SPONT: NORMAL
BH CV LOWER VASCULAR RIGHT DISTAL FEMORAL COMPRESS: NORMAL
BH CV LOWER VASCULAR RIGHT GASTRONEMIUS COMPRESS: NORMAL
BH CV LOWER VASCULAR RIGHT GREATER SAPH AK COMPRESS: NORMAL
BH CV LOWER VASCULAR RIGHT GREATER SAPH BK COMPRESS: NORMAL
BH CV LOWER VASCULAR RIGHT LESSER SAPH COMPRESS: NORMAL
BH CV LOWER VASCULAR RIGHT MID FEMORAL AUGMENT: NORMAL
BH CV LOWER VASCULAR RIGHT MID FEMORAL COMPETENT: NORMAL
BH CV LOWER VASCULAR RIGHT MID FEMORAL COMPRESS: NORMAL
BH CV LOWER VASCULAR RIGHT MID FEMORAL PHASIC: NORMAL
BH CV LOWER VASCULAR RIGHT MID FEMORAL SPONT: NORMAL
BH CV LOWER VASCULAR RIGHT PERONEAL COMPRESS: NORMAL
BH CV LOWER VASCULAR RIGHT POPLITEAL AUGMENT: NORMAL
BH CV LOWER VASCULAR RIGHT POPLITEAL COMPETENT: NORMAL
BH CV LOWER VASCULAR RIGHT POPLITEAL COMPRESS: NORMAL
BH CV LOWER VASCULAR RIGHT POPLITEAL PHASIC: NORMAL
BH CV LOWER VASCULAR RIGHT POPLITEAL SPONT: NORMAL
BH CV LOWER VASCULAR RIGHT POSTERIOR TIBIAL COMPRESS: NORMAL
BH CV LOWER VASCULAR RIGHT PROFUNDA FEMORAL COMPRESS: NORMAL
BH CV LOWER VASCULAR RIGHT PROXIMAL FEMORAL COMPRESS: NORMAL
BH CV LOWER VASCULAR RIGHT SAPHENOFEMORAL JUNCTION COMPRESS: NORMAL

## 2024-11-06 PROCEDURE — 93970 EXTREMITY STUDY: CPT

## 2024-11-07 ENCOUNTER — HOME CARE VISIT (OUTPATIENT)
Dept: HOME HEALTH SERVICES | Facility: HOME HEALTHCARE | Age: 84
End: 2024-11-07
Payer: MEDICARE

## 2024-11-07 ENCOUNTER — TELEPHONE (OUTPATIENT)
Dept: INTERNAL MEDICINE | Facility: CLINIC | Age: 84
End: 2024-11-07

## 2024-11-07 VITALS
DIASTOLIC BLOOD PRESSURE: 72 MMHG | HEART RATE: 74 BPM | SYSTOLIC BLOOD PRESSURE: 122 MMHG | OXYGEN SATURATION: 98 % | RESPIRATION RATE: 18 BRPM | TEMPERATURE: 96.5 F

## 2024-11-07 PROCEDURE — G0300 HHS/HOSPICE OF LPN EA 15 MIN: HCPCS

## 2024-11-07 PROCEDURE — G0151 HHCP-SERV OF PT,EA 15 MIN: HCPCS

## 2024-11-07 NOTE — TELEPHONE ENCOUNTER
Caller:     Relationship:     Best call back number: [phone]     What was the call regarding:   PATIENT HAD A FALL AT HOME.  SKIN TEAR RIGHT DYSTAL FORARM AND HEAD HAD A SCRAPE.  PATIENT REFUSED TO GO THE ER.  NOTIFIED TO CONTACT OFFICE IF ANY PROBLEMS.    A [facility] NURSE WILL COME OUT TODAY.

## 2024-11-10 VITALS
SYSTOLIC BLOOD PRESSURE: 122 MMHG | RESPIRATION RATE: 18 BRPM | HEART RATE: 74 BPM | OXYGEN SATURATION: 98 % | TEMPERATURE: 96.5 F | DIASTOLIC BLOOD PRESSURE: 72 MMHG

## 2024-11-11 ENCOUNTER — HOME CARE VISIT (OUTPATIENT)
Dept: HOME HEALTH SERVICES | Facility: HOME HEALTHCARE | Age: 84
End: 2024-11-11
Payer: MEDICARE

## 2024-11-11 VITALS
SYSTOLIC BLOOD PRESSURE: 126 MMHG | RESPIRATION RATE: 18 BRPM | DIASTOLIC BLOOD PRESSURE: 70 MMHG | OXYGEN SATURATION: 95 % | HEART RATE: 84 BPM | TEMPERATURE: 97.2 F

## 2024-11-11 PROCEDURE — G0151 HHCP-SERV OF PT,EA 15 MIN: HCPCS

## 2024-11-13 ENCOUNTER — HOME CARE VISIT (OUTPATIENT)
Dept: HOME HEALTH SERVICES | Facility: HOME HEALTHCARE | Age: 84
End: 2024-11-13
Payer: MEDICARE

## 2024-11-13 VITALS
RESPIRATION RATE: 18 BRPM | TEMPERATURE: 97.2 F | OXYGEN SATURATION: 96 % | HEART RATE: 62 BPM | DIASTOLIC BLOOD PRESSURE: 76 MMHG | SYSTOLIC BLOOD PRESSURE: 130 MMHG

## 2024-11-13 PROCEDURE — G0151 HHCP-SERV OF PT,EA 15 MIN: HCPCS

## 2024-11-15 ENCOUNTER — HOME CARE VISIT (OUTPATIENT)
Dept: HOME HEALTH SERVICES | Facility: HOME HEALTHCARE | Age: 84
End: 2024-11-15
Payer: MEDICARE

## 2024-11-15 PROCEDURE — G0300 HHS/HOSPICE OF LPN EA 15 MIN: HCPCS

## 2024-11-17 VITALS
BODY MASS INDEX: 22.81 KG/M2 | SYSTOLIC BLOOD PRESSURE: 132 MMHG | WEIGHT: 159 LBS | HEART RATE: 52 BPM | TEMPERATURE: 97.6 F | RESPIRATION RATE: 18 BRPM | OXYGEN SATURATION: 96 % | DIASTOLIC BLOOD PRESSURE: 80 MMHG

## 2024-11-18 ENCOUNTER — HOME CARE VISIT (OUTPATIENT)
Dept: HOME HEALTH SERVICES | Facility: HOME HEALTHCARE | Age: 84
End: 2024-11-18
Payer: MEDICARE

## 2024-11-18 VITALS
SYSTOLIC BLOOD PRESSURE: 130 MMHG | HEART RATE: 69 BPM | RESPIRATION RATE: 18 BRPM | TEMPERATURE: 96.8 F | DIASTOLIC BLOOD PRESSURE: 70 MMHG | OXYGEN SATURATION: 97 %

## 2024-11-18 PROCEDURE — G0151 HHCP-SERV OF PT,EA 15 MIN: HCPCS

## 2024-11-19 ENCOUNTER — HOME CARE VISIT (OUTPATIENT)
Dept: HOME HEALTH SERVICES | Facility: HOME HEALTHCARE | Age: 84
End: 2024-11-19
Payer: MEDICARE

## 2024-11-19 VITALS
OXYGEN SATURATION: 98 % | TEMPERATURE: 96.2 F | RESPIRATION RATE: 18 BRPM | DIASTOLIC BLOOD PRESSURE: 82 MMHG | SYSTOLIC BLOOD PRESSURE: 122 MMHG | HEART RATE: 59 BPM

## 2024-11-19 PROCEDURE — G0299 HHS/HOSPICE OF RN EA 15 MIN: HCPCS

## 2024-11-20 ENCOUNTER — HOME CARE VISIT (OUTPATIENT)
Dept: HOME HEALTH SERVICES | Facility: HOME HEALTHCARE | Age: 84
End: 2024-11-20
Payer: MEDICARE

## 2024-11-20 VITALS
DIASTOLIC BLOOD PRESSURE: 76 MMHG | RESPIRATION RATE: 18 BRPM | OXYGEN SATURATION: 96 % | HEART RATE: 70 BPM | SYSTOLIC BLOOD PRESSURE: 130 MMHG | TEMPERATURE: 97 F

## 2024-11-20 PROCEDURE — G0151 HHCP-SERV OF PT,EA 15 MIN: HCPCS

## 2024-11-20 NOTE — Clinical Note
This is to notify your offices that patient has been discharged from home PT/agency [facility] [id number] with goals met and will continue HEP/home walking on his own and then they will set him up for outpatient PT in December at [facility]. Thanks for the referral!

## 2024-11-21 NOTE — HOME HEALTH
Patient reports doing well overall, feels he is making great progress.  No reported med changes.  Lung sounds clear, vitals WNL, weight stable.  Wounds healed.  Patient has no objections to SN discharge today and PT agency discharge tomorrow.

## 2024-11-22 ENCOUNTER — TELEPHONE (OUTPATIENT)
Dept: INTERNAL MEDICINE | Facility: CLINIC | Age: 84
End: 2024-11-22
Payer: MEDICARE

## 2024-11-22 ENCOUNTER — LAB (OUTPATIENT)
Dept: LAB | Facility: HOSPITAL | Age: 84
End: 2024-11-22
Payer: MEDICARE

## 2024-11-22 ENCOUNTER — OFFICE VISIT (OUTPATIENT)
Dept: INTERNAL MEDICINE | Facility: CLINIC | Age: 84
End: 2024-11-22
Payer: MEDICARE

## 2024-11-22 ENCOUNTER — HOSPITAL ENCOUNTER (OUTPATIENT)
Dept: GENERAL RADIOLOGY | Facility: HOSPITAL | Age: 84
End: 2024-11-22
Payer: MEDICARE

## 2024-11-22 VITALS
BODY MASS INDEX: 23.71 KG/M2 | TEMPERATURE: 96.5 F | HEART RATE: 129 BPM | OXYGEN SATURATION: 99 % | WEIGHT: 165.6 LBS | HEIGHT: 70 IN

## 2024-11-22 DIAGNOSIS — R53.83 FATIGUE, UNSPECIFIED TYPE: ICD-10-CM

## 2024-11-22 DIAGNOSIS — J90 BILATERAL PLEURAL EFFUSION: ICD-10-CM

## 2024-11-22 DIAGNOSIS — I50.32 CHRONIC HEART FAILURE WITH PRESERVED EJECTION FRACTION: ICD-10-CM

## 2024-11-22 DIAGNOSIS — R34 URINE OUTPUT LOW: ICD-10-CM

## 2024-11-22 DIAGNOSIS — R05.1 ACUTE COUGH: Primary | ICD-10-CM

## 2024-11-22 DIAGNOSIS — R05.1 ACUTE COUGH: ICD-10-CM

## 2024-11-22 DIAGNOSIS — R41.0 CONFUSED: ICD-10-CM

## 2024-11-22 DIAGNOSIS — I48.91 ATRIAL FIBRILLATION WITH RVR: ICD-10-CM

## 2024-11-22 LAB
ALBUMIN SERPL-MCNC: 3.7 G/DL (ref 3.5–5.2)
ALBUMIN/GLOB SERPL: 1.1 G/DL
ALP SERPL-CCNC: 145 U/L (ref 39–117)
ALT SERPL W P-5'-P-CCNC: 19 U/L (ref 1–41)
ANION GAP SERPL CALCULATED.3IONS-SCNC: 8.2 MMOL/L (ref 5–15)
AST SERPL-CCNC: 32 U/L (ref 1–40)
BASOPHILS # BLD AUTO: 0.07 10*3/MM3 (ref 0–0.2)
BASOPHILS NFR BLD AUTO: 1 % (ref 0–1.5)
BILIRUB SERPL-MCNC: 1.2 MG/DL (ref 0–1.2)
BUN SERPL-MCNC: 19 MG/DL (ref 8–23)
BUN/CREAT SERPL: 22.4 (ref 7–25)
CALCIUM SPEC-SCNC: 9.5 MG/DL (ref 8.6–10.5)
CHLORIDE SERPL-SCNC: 96 MMOL/L (ref 98–107)
CO2 SERPL-SCNC: 30.8 MMOL/L (ref 22–29)
CREAT SERPL-MCNC: 0.85 MG/DL (ref 0.76–1.27)
DEPRECATED RDW RBC AUTO: 49.8 FL (ref 37–54)
EGFRCR SERPLBLD CKD-EPI 2021: 86.8 ML/MIN/1.73
EOSINOPHIL # BLD AUTO: 0.16 10*3/MM3 (ref 0–0.4)
EOSINOPHIL NFR BLD AUTO: 2.3 % (ref 0.3–6.2)
ERYTHROCYTE [DISTWIDTH] IN BLOOD BY AUTOMATED COUNT: 13.4 % (ref 12.3–15.4)
GLOBULIN UR ELPH-MCNC: 3.4 GM/DL
GLUCOSE SERPL-MCNC: 136 MG/DL (ref 65–99)
HCT VFR BLD AUTO: 38 % (ref 37.5–51)
HGB BLD-MCNC: 12.6 G/DL (ref 13–17.7)
IMM GRANULOCYTES # BLD AUTO: 0.03 10*3/MM3 (ref 0–0.05)
IMM GRANULOCYTES NFR BLD AUTO: 0.4 % (ref 0–0.5)
LYMPHOCYTES # BLD AUTO: 0.63 10*3/MM3 (ref 0.7–3.1)
LYMPHOCYTES NFR BLD AUTO: 8.9 % (ref 19.6–45.3)
MCH RBC QN AUTO: 33.2 PG (ref 26.6–33)
MCHC RBC AUTO-ENTMCNC: 33.2 G/DL (ref 31.5–35.7)
MCV RBC AUTO: 100.3 FL (ref 79–97)
MONOCYTES # BLD AUTO: 0.69 10*3/MM3 (ref 0.1–0.9)
MONOCYTES NFR BLD AUTO: 9.7 % (ref 5–12)
NEUTROPHILS NFR BLD AUTO: 5.52 10*3/MM3 (ref 1.7–7)
NEUTROPHILS NFR BLD AUTO: 77.7 % (ref 42.7–76)
NRBC BLD AUTO-RTO: 0 /100 WBC (ref 0–0.2)
NT-PROBNP SERPL-MCNC: 6521 PG/ML (ref 0–1800)
PLATELET # BLD AUTO: 270 10*3/MM3 (ref 140–450)
PMV BLD AUTO: 10.2 FL (ref 6–12)
POTASSIUM SERPL-SCNC: 3.6 MMOL/L (ref 3.5–5.2)
PROCALCITONIN SERPL-MCNC: 0.06 NG/ML (ref 0–0.25)
PROT SERPL-MCNC: 7.1 G/DL (ref 6–8.5)
RBC # BLD AUTO: 3.79 10*6/MM3 (ref 4.14–5.8)
SODIUM SERPL-SCNC: 135 MMOL/L (ref 136–145)
WBC NRBC COR # BLD AUTO: 7.1 10*3/MM3 (ref 3.4–10.8)

## 2024-11-22 PROCEDURE — 84145 PROCALCITONIN (PCT): CPT

## 2024-11-22 PROCEDURE — 83880 ASSAY OF NATRIURETIC PEPTIDE: CPT

## 2024-11-22 PROCEDURE — 99214 OFFICE O/P EST MOD 30 MIN: CPT | Performed by: FAMILY MEDICINE

## 2024-11-22 PROCEDURE — 85025 COMPLETE CBC W/AUTO DIFF WBC: CPT

## 2024-11-22 PROCEDURE — 36415 COLL VENOUS BLD VENIPUNCTURE: CPT

## 2024-11-22 PROCEDURE — 80053 COMPREHEN METABOLIC PANEL: CPT

## 2024-11-22 NOTE — TELEPHONE ENCOUNTER
Tamera from X-ray called and said that the patient is very weak and has been vomiting and is gagging. They were able to do his labs but not the urine or the chest xray. We advised her to have him go down to the Urgent Care there. She said that the doctor there would contact you if needed.

## 2024-11-22 NOTE — PROGRESS NOTES
Date of Encounter: 2024  Patient: Gabo Boo,  1940    Subjective   History of Presenting Illness  Chief complaint: Feeling unwell    Since yesterday wife and patient have noticed that he has been feeling unwell.  Symptoms include cough, mild confusion, reduced and dark-colored urination, poor balance with a recent fall without head injury, reduced p.o. intake.  No fever or chills.  Patient does not feel short of breath.  They have not been taking Bumex because there has been no lower extremity swelling.  He did have 1 glass of bourbon yesterday which he feels may be related to the balance issues.    The following portions of the patient's history were reviewed and updated as appropriate: allergies, current medications, past family history, past medical history, past social history, past surgical history and problem list.    Patient Active Problem List   Diagnosis    Anxiety    Hyperlipidemia    Resistant hypertension    Insomnia    Prediabetes    Focal epilepsy    Hygroma    Seizure    Partial seizures    Vitamin D deficiency    H/O lead exposure    Paroxysmal atrial fibrillation    Hx of adenomatous polyp of colon    Presence of Watchman left atrial appendage closure device    Ascending aortic aneurysm    Tricuspid regurgitation    Chronic heart failure with preserved ejection fraction    Atrial fibrillation with RVR    Bilateral pleural effusion    TIFFANY (obstructive sleep apnea) on CPAP    On supplemental oxygen therapy at night    Bilateral carotid artery disease    Gynecomastia    Pulmonary embolism without acute cor pulmonale    Chronic subdural hematoma     Past Medical History:   Diagnosis Date    Abnormal barium swallow 2016    HH, reflux    ADHD (attention deficit hyperactivity disorder)     AF (paroxysmal atrial fibrillation)     Anemia 2021    Anxiety     Bilateral lower extremity edema     CHF (congestive heart failure) -    Coronary artery disease     GERD  (gastroesophageal reflux disease)     H/O cataract     Dr Heron Hoffman    Hernia, epigastric 10/14/2020    Added automatically from request for surgery [id number]    Hiatal hernia     Hyperlipidemia     Hypertension     Internal hemorrhoids     Mood disorder 07/27/2017    PONV (postoperative nausea and vomiting)     Seizures     July 2013 was last seizure    Tubular adenoma of colon     Umbilical hernia without obstruction and without gangrene 10/14/2020    Added automatically from request for surgery [id number]     Past Surgical History:   Procedure Laterality Date    CARDIAC SURGERY      CATARACT EXTRACTION Bilateral     COLONOSCOPY  04/23/2013    Dr Galeano/TITO-dontes, Ih, tubular adenoma w/low grade dysplasia    COLONOSCOPY N/A 11/03/2020    Procedure: COLONOSCOPY;  Surgeon: Gabo Power MD;  Location: [facility] MAIN OR;  Service: General;  Laterality: N/A;    ENDOSCOPY N/A 12/16/2016    Procedure: ESOPHAGOGASTRODUODENOSCOPY WITH COLD BIOPSIES AND 54 FR KENNY DILATATION;  Surgeon: Shiraz Galeano MD;  Location: [facility] ENDOSCOPY;  Service:     INGUINAL HERNIA REPAIR      as a child and again in 1970 approx.    KRISTYN Left 03/2019    Left Atrial Appendage Occlusion W KRISTYN    TONSILLECTOMY      VENTRAL/INCISIONAL HERNIA REPAIR N/A 11/03/2020    Procedure: VENTRAL HERNIA REPAIR WITH MESH;  Surgeon: Gabo Power MD;  Location: [facility] MAIN OR;  Service: General;  Laterality: N/A;    WRIST SURGERY Right      Family History   Problem Relation Age of Onset    Stroke Father     COPD Father     Thyroid disease Daughter     Thyroid disease Daughter     Malig Hyperthermia Neg Hx        Current Outpatient Medications:     Astaxanthin 4 MG capsule, Take  by mouth. Indications: supplement, Disp: , Rfl:     B Complex Vitamins (VITAMIN B COMPLEX) capsule capsule, Take 1 capsule by mouth Daily. Indications: SUPPLEMENT, Disp: , Rfl:     bumetanide (BUMEX) 1 MG tablet, Take 1 tablet by mouth Daily. Patient alternate with 0.5 mg daily d/t  weight loss  Indications: Edema, Disp: , Rfl:     Coenzyme Q10 (CoQ10) 100 MG capsule, Take 1 capsule by mouth 2 (Two) Times a Day. WITH OBQUINOL IN IT.   Indications: heart failure, Disp: , Rfl:     dilTIAZem CD (CARDIZEM CD) 120 MG 24 hr capsule, Take 1 capsule by mouth Daily. Indications: a. fib., Disp: , Rfl:     lacosamide (Vimpat) 200 MG tablet, Take 1 tablet by mouth 2 (Two) Times a Day. TAKES BRAND VIMPAT  Indications: Seizures that are Not Controlled, Disp: , Rfl:     Lactobacillus (PROBIOTIC ACIDOPHILUS PO), Take 1 tablet by mouth Daily. Indications: general wellness, Disp: , Rfl:     Magnesium Oxide 400 MG capsule, Take 200 mg by mouth Daily. 1/2 TABLET DAILY  Indications: UNKNOWN, Disp: , Rfl:     Melatonin 3 MG tablet dispersible, Place  on the tongue. Indications: SLEEP, Disp: , Rfl:     metoprolol succinate XL (TOPROL-XL) 50 MG 24 hr tablet, Take 1 tablet by mouth 2 (Two) Times a Day. Indications: Atrial Fibrillation, Disp: 180 tablet, Rfl: 3    Multiple Vitamin (MULTIVITAMIN PO), Take 1 tablet by mouth Daily. Indications: SUPPLEMENT, Disp: , Rfl:     Nattokinase 100 MG capsule, Take 200 mg by mouth Daily. Indications: general wellness, Disp: , Rfl:     Omega-3 Fatty Acids (OMEGA-3 FISH OIL PO), Take 1 capsule by mouth Daily. Indications: general wellness, Disp: , Rfl:     Vitamin D-Vitamin K (D3 + K2 Dots) 1000-90 UNIT-MCG tablet, Take 1 tablet/day by mouth Daily. HOLD ALL SUPPLEMENTS FOR SURGERY   Indications: SUPPLEMENT, Disp: , Rfl:     Current Facility-Administered Medications:     cyanocobalamin injection 1,000 mcg, 1,000 mcg, Intramuscular, Q28 Days, Tomas Lopez MD  No Known Allergies  Social History     Tobacco Use    Smoking status: Never    Smokeless tobacco: Never   Vaping Use    Vaping status: Never Used   Substance Use Topics    Alcohol use: Yes     Alcohol/week: 4.0 standard drinks of alcohol     Types: 1 Glasses of wine, 1 Cans of beer, 2 Shots of liquor per week     Comment: daily     "Drug use: Never          Objective   Physical Exam  Vitals:    11/22/24 0911   Pulse: (!) 129   Temp: 96.5 °F (35.8 °C)   TempSrc: Infrared   SpO2: 99%   Weight: 75.1 kg (165 lb 9.6 oz)   Height: 177.8 cm (70\")     Body mass index is 23.76 kg/m².    Constitutional: NAD.  Cardiovascular: Irregularly irregular, rate greater than 100. No LE edema b/l. Radial pulses 2+ bilaterally.  Pulmonary: Diminished breath sounds bilaterally.  Poor effort.  Integumentary: No rashes or wounds on face or upper extremities.  Lymphatic: No anterior cervical lymphadenopathy.  Endocrine: No thyromegaly or palpable thyroid nodules.  Psychiatric: Oriented to date and place, but some subtle confusion about recent symptoms and when asking questions  Gastrointestinal: Nondistended. No hepatosplenomegaly. No focal tenderness to palpation.      Assessment & Plan   Assessment and Plan  Pleasant 84-year-old male with seizure disorder, paroxysmal atrial fibrillation with watchman device and recurrent RVR, heart failure with preserved ejection fraction and bilateral pleural effusions, ascending aortic aneurysm, resistant hypertension, hyperlipidemia, history of prediabetes, vitamin D deficiency, insomnia, among other problems, who presents for the following:     Diagnoses and all orders for this visit:    1. Acute cough (Primary)  -     XR Chest 2 View  -     BNP; Future  -     CBC & Differential; Future  -     Comprehensive Metabolic Panel; Future  -     Procalcitonin; Future  -     Urinalysis With Microscopic - Urine, Clean Catch; Future  -     Urine Culture - Urine, Urine, Clean Catch; Future    2. Fatigue, unspecified type  -     XR Chest 2 View  -     BNP; Future  -     CBC & Differential; Future  -     Comprehensive Metabolic Panel; Future  -     Procalcitonin; Future  -     Urinalysis With Microscopic - Urine, Clean Catch; Future  -     Urine Culture - Urine, Urine, Clean Catch; Future    3. Urine output low  -     XR Chest 2 View  -     " BNP; Future  -     CBC & Differential; Future  -     Comprehensive Metabolic Panel; Future  -     Procalcitonin; Future  -     Urinalysis With Microscopic - Urine, Clean Catch; Future  -     Urine Culture - Urine, Urine, Clean Catch; Future    4. Confused  -     XR Chest 2 View  -     BNP; Future  -     CBC & Differential; Future  -     Comprehensive Metabolic Panel; Future  -     Procalcitonin; Future  -     Urinalysis With Microscopic - Urine, Clean Catch; Future  -     Urine Culture - Urine, Urine, Clean Catch; Future    5. Atrial fibrillation with RVR  -     XR Chest 2 View  -     BNP; Future  -     CBC & Differential; Future  -     Comprehensive Metabolic Panel; Future  -     Procalcitonin; Future  -     Urinalysis With Microscopic - Urine, Clean Catch; Future  -     Urine Culture - Urine, Urine, Clean Catch; Future    6. Chronic heart failure with preserved ejection fraction  -     XR Chest 2 View  -     BNP; Future  -     CBC & Differential; Future  -     Comprehensive Metabolic Panel; Future  -     Procalcitonin; Future  -     Urinalysis With Microscopic - Urine, Clean Catch; Future  -     Urine Culture - Urine, Urine, Clean Catch; Future    7. Bilateral pleural effusion  -     XR Chest 2 View  -     BNP; Future  -     CBC & Differential; Future  -     Comprehensive Metabolic Panel; Future  -     Procalcitonin; Future  -     Urinalysis With Microscopic - Urine, Clean Catch; Future  -     Urine Culture - Urine, Urine, Clean Catch; Future    Examination concerning for elevated heart rate, diminished breath sounds, mild cognitive changes.    In context of his comorbidities I recommend chest x-ray and labs at [facility] for prompt evaluation before the weekend.  He will head there now.    Tomas Lopez MD  Family Medicine  O: [phone]    Disclaimer: Parts of this note were dictated by speech recognition. Minor errors in transcription may be present. Please call if questions.

## 2024-11-30 ENCOUNTER — READMISSION MANAGEMENT (OUTPATIENT)
Dept: CALL CENTER | Facility: HOSPITAL | Age: 84
End: 2024-11-30
Payer: MEDICARE

## 2024-11-30 NOTE — OUTREACH NOTE
Prep Survey      Flowsheet Row Responses   [facility] facility patient discharged from? Non-BH   Is LACE score < 7 ? Non-BH Discharge   Eligibility Not Eligible   What are the reasons patient is not eligible? [facility]   Does the patient have one of the following disease processes/diagnoses(primary or secondary)? Other   Prep survey completed? Yes            KIRT MARIE - Registered Nurse

## 2024-12-20 ENCOUNTER — TRANSCRIBE ORDERS (OUTPATIENT)
Dept: HOME HEALTH SERVICES | Facility: HOME HEALTHCARE | Age: 84
End: 2024-12-20
Payer: MEDICARE

## 2024-12-20 DIAGNOSIS — I48.91 ATRIAL FIBRILLATION, UNSPECIFIED TYPE: ICD-10-CM

## 2024-12-20 DIAGNOSIS — J96.01 ACUTE RESPIRATORY FAILURE WITH HYPOXIA: Primary | ICD-10-CM

## 2024-12-21 ENCOUNTER — TRANSCRIBE ORDERS (OUTPATIENT)
Dept: HOME HEALTH SERVICES | Facility: HOME HEALTHCARE | Age: 84
End: 2024-12-21
Payer: MEDICARE

## 2024-12-21 ENCOUNTER — HOME HEALTH ADMISSION (OUTPATIENT)
Dept: HOME HEALTH SERVICES | Facility: HOME HEALTHCARE | Age: 84
End: 2024-12-21
Payer: MEDICARE

## 2024-12-21 DIAGNOSIS — I50.33 ACUTE ON CHRONIC DIASTOLIC HEART FAILURE: Primary | ICD-10-CM

## 2024-12-27 ENCOUNTER — HOME CARE VISIT (OUTPATIENT)
Dept: HOME HEALTH SERVICES | Facility: HOME HEALTHCARE | Age: 84
End: 2024-12-27
Payer: MEDICARE

## 2024-12-27 PROCEDURE — G0299 HHS/HOSPICE OF RN EA 15 MIN: HCPCS

## 2024-12-28 VITALS
TEMPERATURE: 96.3 F | RESPIRATION RATE: 18 BRPM | SYSTOLIC BLOOD PRESSURE: 106 MMHG | WEIGHT: 146 LBS | HEIGHT: 70 IN | HEART RATE: 58 BPM | DIASTOLIC BLOOD PRESSURE: 76 MMHG | OXYGEN SATURATION: 99 % | BODY MASS INDEX: 20.9 KG/M2

## 2024-12-29 NOTE — HOME HEALTH
SOC Note: Patient is A&O to person and place. He was unable to tell nurse why he went to hospital and what happened. Even with gentle reminders he was still unable to remember. Patient does report some dizziness on the day of assessment. BP assessed 106/76 with a heart rate of 58. He has only consumed 12 fluid ounces of fluid by 330pm. I have instructed on importance of nutrition and hydration during my visit. Patient's nutrition is very poor. Spouse reports he has lost around 100lbs over the past yr and a half. Patient reports nausea and vomiting that is not abnormal for him. It was noted during my visit that while he was drinking he did get choked on his fluid twice. spouse did tell nurse that this has been happening for about a yr now.   He has also reported to nurse that he is having issues with having bowel movements with last BM reported to nurse about 3-4 days ago. Patient does have bowel sounds. He is restarting his home regimen of metamucil. I instructed on prune juice, hydration, and using the PRN senna that they were using at rehab.  Instructed on weights and weight gain. Patient has been instructed to weigh every other day. He is on a 2 gm NA restricted diet and 2 Liter fluid restricted diet.     Patient needs a hospital follow up with PCP. All other follow up appointments have been scheduled    Home Health ordered for: disciplines  PT, OT, SN 1W1, 2W2, 1W2; Patient requesting Theo for his nurse    Reason for Hosp/Primary Dx/Co-morbidities: Discharged from home health on 11/20    On 11/22 he saw PCP for cough, confusion, fatigue, and decreased urine output  Had chest xray performed and syncope episode occurred which prompted a ER visit and diagnosed with generalized weakness and nausea and vomiting. They were given the option for hospitalization to monitor but both patient and spouse declined at that time    On 11/26 he returned back to [facility] for SOA where he was hospitalized until 11/30 for  "acute hypoxic respiratory failure, acute on chronic diastolic heart failure, afib with rapid ventricular response, HTN    He was transferred to [facility] for rehab and discharged from there on 12/23    Focus of Care: SN needed for teaching and assessments for heart failure    Patient's goal(s): \"feel better and feel stronger\"    Current Functional status/mobility/DME:  spouse reports that he is primarily using his wheelchair for mobility in home. States that while in rehab he was walking with his walker. He became ill with vomiting on Friday which they believed was due to him not having a bowel movement for quite a while. When he became ill he regressed back to just using the wheelchair. They did inform the nurse that he has been up twice today with walker and did a lap or 2 around the kitchen. Advised on walking program 3 times a day and leg lifts while sitting until therapy is able to give more tailored exercises. Patient discussed regarding doing the standing kitchen counter exercises as he had previously did when home health saw him about a month ago. He is very shakey and advised him to wait until therapy can evaluate.    HB status/Living Arrangements: Lives with spouse on primary level of home.     Skin Integrity/wound status: NA    Code Status: CPR    Fall Risk/Safety concerns: No falls since coming home. Does report a fall in rehab    Educated on Emergency Plan, steps to take prior to going to the ER and when to Call [facility].    Medication issues/Concerns: Spouse is managing medications. List updated.    Additional Problems/Concerns: na    SDOH Barriers (i.e. caregiver concerns, social isolation, transportation, food insecurity, environment, income etc.)/Need for MSW: na"

## 2024-12-31 ENCOUNTER — HOME CARE VISIT (OUTPATIENT)
Dept: HOME HEALTH SERVICES | Facility: HOME HEALTHCARE | Age: 84
End: 2024-12-31
Payer: MEDICARE

## 2024-12-31 VITALS
TEMPERATURE: 97.1 F | DIASTOLIC BLOOD PRESSURE: 97 MMHG | HEART RATE: 54 BPM | OXYGEN SATURATION: 96 % | SYSTOLIC BLOOD PRESSURE: 143 MMHG

## 2024-12-31 PROCEDURE — G0152 HHCP-SERV OF OT,EA 15 MIN: HCPCS

## 2024-12-31 NOTE — HOME HEALTH
REASON FOR REFERRAL: Pt is an 83 y/o male who has had 2 hospitalizations over the last 2 months. On 11/22 he saw PCP for cough, confusion, fatigue, and decreased urine output. Had chest xray performed and syncope episode occurred which prompted a ER visit and diagnosed with generalized weakness and nausea and vomiting. They were given the option for hospitalization to monitor but both patient and spouse declined at that time. On 11/26 he returned back to [facility] for SOA where he was hospitalized until 11/30 for acute hypoxic respiratory failure, acute on chronic diastolic heart failure, afib with rapid ventricular response, HTN. He was transferred to [facility] for rehab and discharged from there on 12/23.     Spouse also reports that his initial decline started in September when they went on a trip to [place], [place] and pt believed he has food poisoning. He leaned out of bed to vomit and fell out of bed suffering a head injury requiring additional hospitalization.     PMHx: Afib, HTN,TIFFANY, anxiety, subdural hematoma.   OT's FOCUS OF CARE: AE use, LBD, toileting safe, dynamic balance, safety awareness  SUBJECTIVE: Spouse reports pt is very fatigued this date, but in high spirits because the fatigue is due to his increased walking following nursing recommendations.  SOCIAL & ENVIRONMENTAL SITUATION: Pt lives in a [facility] with his spouse. She has recently hired a caregiver who is there 5 days/wk from approx 9-3, but may be there on the weekends as well. Caregivers is assisting with bathing, toileting, and light meal prep/housekeeping.  Spouse felt this was approrpriate because of pt's balance deficits and cognitive deficits. Pt is using a RW when he feels strong, but a wc when he doesn't. Pt may be suffering from vertigo, but is definitely suffering from malnutrition with a reported weight loss of approx 100lbs over the past year and a half. Pt also reports difficutly swallowing water and some crackers getting  "choked on them or feeling as if they are  \"stuck.\" OT strongly recommended pt and spouse make appointment with PCP to discuss the weight loss and difficulties swallowing as the malnutrition will affect his therapy outcomes. Pt has a walk in shower, grab bars, built in seat, and HH shower head. He has been getting dressed supine due to his light headed/dizziness.   PATIENT'S &/OR CAREGIVER'S GOAL: Spouse wishes for pt's overall balance to improve. Pt wishes to be able to toilet and walk around the home on his own./   INTERVENTIONS: dynamic balance, activity tolerance, LBD strategies, toileting strategies, functional mobility, cognitive carryover of recommended strategies  ASSESSMENT: Pt would benefit from skilled OT services to progress I with LBD, toileting, and functional mobility within the home to reduce risk of falls and optimize QOL.  MEDICAL NECESSITY: Skilled OT services will progress patient I by establishing safe routines including functional t/fs, functional mobility within the home, strength, activity tolerance, pt/caregiver education, and AD/AE/DME recommendations. Skilled OT services are indicated in order to reduce the patient's BOC and improve overall I so that patient may safely age in place.  PLAN: OT to tx 2W4  PLAN FOR NEXT VISIT: dynamic balance, LBD"

## 2024-12-31 NOTE — Clinical Note
Dear Dr. Lopez,   OT david performed this date, 12/31/24, with OT requesting 2 visits x 4 weeks addressing toileting I, bathing I, IADLs, and caregiver edu for increased home safety secondary to recent CHF exacerbation with decline in function.   Thank you,  Greta Khan OTR/L

## 2025-01-02 ENCOUNTER — HOME CARE VISIT (OUTPATIENT)
Dept: HOME HEALTH SERVICES | Facility: HOME HEALTHCARE | Age: 85
End: 2025-01-02
Payer: MEDICARE

## 2025-01-02 VITALS
TEMPERATURE: 97.3 F | HEART RATE: 85 BPM | OXYGEN SATURATION: 97 % | DIASTOLIC BLOOD PRESSURE: 76 MMHG | SYSTOLIC BLOOD PRESSURE: 117 MMHG

## 2025-01-02 PROCEDURE — G0152 HHCP-SERV OF OT,EA 15 MIN: HCPCS

## 2025-01-02 NOTE — CASE COMMUNICATION
Patient missed a SN visit from [facility] on 12/31/24.     Reason: Nurse spoke to patient's wife and wife request no visit today d/t wife will not be home on nurse arrival. Patient's wife agreeable to visit on Friday and request afternoon visit.       For your records only.   Per CMS Guidance, MD must be notified of missed/cancelled visits; therefore the prescribed frequency was not met.

## 2025-01-03 ENCOUNTER — HOME CARE VISIT (OUTPATIENT)
Dept: HOME HEALTH SERVICES | Facility: HOME HEALTHCARE | Age: 85
End: 2025-01-03
Payer: MEDICARE

## 2025-01-03 ENCOUNTER — OFFICE VISIT (OUTPATIENT)
Dept: INTERNAL MEDICINE | Facility: CLINIC | Age: 85
End: 2025-01-03
Payer: MEDICARE

## 2025-01-03 VITALS
HEART RATE: 65 BPM | DIASTOLIC BLOOD PRESSURE: 88 MMHG | SYSTOLIC BLOOD PRESSURE: 116 MMHG | RESPIRATION RATE: 18 BRPM | OXYGEN SATURATION: 95 %

## 2025-01-03 VITALS
DIASTOLIC BLOOD PRESSURE: 62 MMHG | SYSTOLIC BLOOD PRESSURE: 128 MMHG | HEART RATE: 44 BPM | OXYGEN SATURATION: 97 % | HEIGHT: 70 IN | BODY MASS INDEX: 20.36 KG/M2 | TEMPERATURE: 96.8 F | WEIGHT: 142.2 LBS

## 2025-01-03 DIAGNOSIS — E44.0 MODERATE PROTEIN-CALORIE MALNUTRITION: ICD-10-CM

## 2025-01-03 DIAGNOSIS — I50.32 CHRONIC HEART FAILURE WITH PRESERVED EJECTION FRACTION: ICD-10-CM

## 2025-01-03 DIAGNOSIS — R73.03 PREDIABETES: ICD-10-CM

## 2025-01-03 DIAGNOSIS — R63.4 WEIGHT LOSS, UNINTENTIONAL: Primary | ICD-10-CM

## 2025-01-03 DIAGNOSIS — E53.8 B12 DEFICIENCY: ICD-10-CM

## 2025-01-03 DIAGNOSIS — K29.70 GASTRITIS, PRESENCE OF BLEEDING UNSPECIFIED, UNSPECIFIED CHRONICITY, UNSPECIFIED GASTRITIS TYPE: ICD-10-CM

## 2025-01-03 DIAGNOSIS — E55.9 VITAMIN D DEFICIENCY: ICD-10-CM

## 2025-01-03 PROCEDURE — G0299 HHS/HOSPICE OF RN EA 15 MIN: HCPCS

## 2025-01-03 PROCEDURE — G0151 HHCP-SERV OF PT,EA 15 MIN: HCPCS

## 2025-01-03 RX ORDER — PANTOPRAZOLE SODIUM 40 MG/1
40 TABLET, DELAYED RELEASE ORAL DAILY
Qty: 90 TABLET | Refills: 3 | Status: SHIPPED | OUTPATIENT
Start: 2025-01-03

## 2025-01-03 RX ORDER — SUCRALFATE 1 G/1
1 TABLET ORAL 2 TIMES DAILY
Qty: 28 TABLET | Refills: 0 | Status: SHIPPED | OUTPATIENT
Start: 2025-01-03 | End: 2025-01-17

## 2025-01-03 NOTE — PROGRESS NOTES
Date of Encounter: 2025  Patient: Gabo Boo,  1940    Subjective   History of Presenting Illness  Chief complaint: Weight loss    Patient was admitted to [facility] / acute respiratory failure secondary to chronic heart failure.  Ejection fraction 2024 51%. Chest x-ray showed improved aeration's with persistent small bilateral pleural effusions that are chronic.  He was given IV Bumex with good effect, resolution of his respiratory failure, was discharged to [facility].    He was at [facility] for a bit over 2 weeks to complete his rehab.    Since being home he reports improvement in his breathing but has lost over 20 pounds.  His appetite is poor.  He is having postprandial nausea, stomach discomfort, several episodes of vomiting nonbloody nonbilious fluid.  Bowel habits are slightly constipated with no diarrhea.  No fever or chills.  No cough.  Slight dysphagia with speech therapy evaluation pending.  Sleeping well and adherent to CPAP.  Describes mood as good.    The following portions of the patient's history were reviewed and updated as appropriate: allergies, current medications, past family history, past medical history, past social history, past surgical history and problem list.    Patient Active Problem List   Diagnosis    Anxiety    Hyperlipidemia    Resistant hypertension    Insomnia    Prediabetes    Focal epilepsy    Hygroma    Seizure    Partial seizures    Vitamin D deficiency    H/O lead exposure    Paroxysmal atrial fibrillation    Hx of adenomatous polyp of colon    Presence of Watchman left atrial appendage closure device    Ascending aortic aneurysm    Tricuspid regurgitation    Chronic heart failure with preserved ejection fraction    Atrial fibrillation with RVR    Bilateral pleural effusion    TIFFANY (obstructive sleep apnea) on CPAP    On supplemental oxygen therapy at night    Bilateral carotid artery disease    Gynecomastia    Pulmonary embolism without  acute cor pulmonale    Chronic subdural hematoma     Past Medical History:   Diagnosis Date    Abnormal barium swallow 07/08/2016    HH, reflux    ADHD (attention deficit hyperactivity disorder)     AF (paroxysmal atrial fibrillation)     Anemia 03/21/2021    Anxiety     Bilateral lower extremity edema     CHF (congestive heart failure) 6-2023    Coronary artery disease     GERD (gastroesophageal reflux disease)     H/O cataract     Dr Heron Hoffman    Hernia, epigastric 10/14/2020    Added automatically from request for surgery [id number]    Hiatal hernia     Hyperlipidemia     Hypertension     Internal hemorrhoids     Mood disorder 07/27/2017    PONV (postoperative nausea and vomiting)     Seizures     July 2013 was last seizure    Tubular adenoma of colon     Umbilical hernia without obstruction and without gangrene 10/14/2020    Added automatically from request for surgery [id number]     Past Surgical History:   Procedure Laterality Date    CARDIAC SURGERY      CATARACT EXTRACTION Bilateral     COLONOSCOPY  04/23/2013    Dr Galeano/Maddison, Ih, tubular adenoma w/low grade dysplasia    COLONOSCOPY N/A 11/03/2020    Procedure: COLONOSCOPY;  Surgeon: Gabo Power MD;  Location: [facility] OR;  Service: General;  Laterality: N/A;    ENDOSCOPY N/A 12/16/2016    Procedure: ESOPHAGOGASTRODUODENOSCOPY WITH COLD BIOPSIES AND 54 FR KENNY DILATATION;  Surgeon: Shiraz Galeano MD;  Location: [facility] ENDOSCOPY;  Service:     INGUINAL HERNIA REPAIR      as a child and again in 1970 approx.    KRISTYN Left 03/2019    Left Atrial Appendage Occlusion W KRISTYN    TONSILLECTOMY      VENTRAL/INCISIONAL HERNIA REPAIR N/A 11/03/2020    Procedure: VENTRAL HERNIA REPAIR WITH MESH;  Surgeon: Gabo Power MD;  Location: [facility] MAIN OR;  Service: General;  Laterality: N/A;    WRIST SURGERY Right      Family History   Problem Relation Age of Onset    Stroke Father     COPD Father     Thyroid disease Daughter     Thyroid disease Daughter      Malig Hyperthermia Neg Hx        Current Outpatient Medications:     Astaxanthin 4 MG capsule, Take 1 tablet by mouth Daily. Indications: supplement, Disp: , Rfl:     B Complex Vitamins (VITAMIN B COMPLEX) capsule capsule, Take 1 capsule by mouth Daily. Indications: SUPPLEMENT, Disp: , Rfl:     Berberine Chloride (BERBERINE HCI PO), Take 1 dose by mouth Daily. Indications: unknown, Disp: , Rfl:     bumetanide (BUMEX) 1 MG tablet, Take 1 tablet by mouth Daily. Indications: Edema, Disp: , Rfl:     clonazePAM (KlonoPIN) 0.5 MG tablet, Take 1 tablet by mouth 2 (Two) Times a Day As Needed for Anxiety. Indications: Feeling Anxious, Disp: , Rfl:     Coenzyme Q10 (CoQ10) 100 MG capsule, Take 1 capsule by mouth 2 (Two) Times a Day. WITH OBQUINOL IN IT.   Indications: heart failure, Disp: , Rfl:     dilTIAZem CD (CARDIZEM CD) 120 MG 24 hr capsule, Take 1 capsule by mouth Daily. Indications: a. fib., Disp: , Rfl:     lacosamide (Vimpat) 200 MG tablet, Take 1 tablet by mouth 2 (Two) Times a Day. TAKES BRAND VIMPAT  Indications: Seizures that are Not Controlled, Disp: , Rfl:     Lactobacillus (PROBIOTIC ACIDOPHILUS PO), Take 1 tablet by mouth Daily. Indications: general wellness, Disp: , Rfl:     Magnesium Oxide 400 MG capsule, Take 200 mg by mouth Daily. 1/2 TABLET DAILY  Indications: UNKNOWN, Disp: , Rfl:     Melatonin 3 MG tablet dispersible, Place 1 tablet on the tongue Every Night. Indications: SLEEP, Disp: , Rfl:     metoprolol succinate XL (TOPROL-XL) 50 MG 24 hr tablet, Take 1 tablet by mouth 2 (Two) Times a Day. Indications: Atrial Fibrillation (Patient taking differently: Take 2 tablets by mouth 2 (Two) Times a Day. Indications: Atrial Fibrillation), Disp: 180 tablet, Rfl: 3    Misc Natural Products (CORTISOL MANAGER PO), Take 1 tablet by mouth Daily. Indications: stress hormone stabilzer, Disp: , Rfl:     Multiple Vitamin (MULTIVITAMIN PO), Take 1 tablet by mouth Daily. Indications: SUPPLEMENT, Disp: , Rfl:      "Nattokinase 100 MG capsule, Take 200 mg by mouth Daily. Indications: general wellness, Disp: , Rfl:     Omega-3 Fatty Acids (OMEGA-3 FISH OIL PO), Take 1 capsule by mouth Daily. Indications: general wellness, Disp: , Rfl:     Probiotic Product (PROBIOTIC DAILY PO), Take 1 dose by mouth Daily. Indications: digestive health, Disp: , Rfl:     Psyllium (METAMUCIL PO), Take 1 dose by mouth Daily. Indications: constipation, Disp: , Rfl:     Unable to find, Take 1 each by mouth Daily. Ultrabrain  Indications: mental health, Disp: , Rfl:     Vitamin D-Vitamin K (D3 + K2 Dots) 1000-90 UNIT-MCG tablet, Take 1 tablet/day by mouth Daily. HOLD ALL SUPPLEMENTS FOR SURGERY   Indications: SUPPLEMENT, Disp: , Rfl:     pantoprazole (PROTONIX) 40 MG EC tablet, Take 1 tablet by mouth Daily. Indications: Indigestion, Disp: 90 tablet, Rfl: 3    sucralfate (Carafate) 1 g tablet, Take 1 tablet by mouth 2 (Two) Times a Day for 14 days. Indications: Stomach Ulcer, Disp: 28 tablet, Rfl: 0    Current Facility-Administered Medications:     cyanocobalamin injection 1,000 mcg, 1,000 mcg, Intramuscular, Q28 Days, Tomas Lopez MD  No Known Allergies  Social History     Tobacco Use    Smoking status: Never    Smokeless tobacco: Never   Vaping Use    Vaping status: Never Used   Substance Use Topics    Alcohol use: Yes     Alcohol/week: 1.0 standard drink of alcohol     Types: 1 Cans of beer per week     Comment: daily    Drug use: Never          Objective   Physical Exam  Vitals:    01/03/25 1046   BP: 128/62   BP Location: Left arm   Patient Position: Sitting   Cuff Size: Adult   Pulse: (!) 44   Temp: 96.8 °F (36 °C)   TempSrc: Infrared   SpO2: 97%   Weight: 64.5 kg (142 lb 3.2 oz)   Height: 177.8 cm (70\")     Body mass index is 20.4 kg/m².    Constitutional: Malnourished  Eyes: Normal conjunctiva.  Cardiovascular: Irregularly irregular.  Not tachycardic. No murmurs. No LE edema b/l. Radial pulses 2+ bilaterally.  Pulmonary: Basilar crackles but " effusions are smaller than last exam  Integumentary: No rashes or wounds on face or upper extremities.  Lymphatic: No anterior cervical lymphadenopathy.  Endocrine: No thyromegaly or palpable thyroid nodules.  Psychiatric: Normal affect.  Some word finding difficulties.     Assessment & Plan   Assessment and Plan  Pleasant 84-year-old male with seizure disorder, paroxysmal atrial fibrillation with watchman device and recurrent RVR, heart failure with preserved ejection fraction and bilateral pleural effusions, ascending aortic aneurysm, resistant hypertension, hyperlipidemia, history of prediabetes, vitamin D deficiency, insomnia, among other problems, who presents for the following:     Diagnoses and all orders for this visit:    1. Weight loss, unintentional (Primary)  -     Hemoglobin A1c  -     CBC & Differential  -     Comprehensive Metabolic Panel  -     Lipid Panel  -     Vitamin D,25-Hydroxy  -     Vitamin B12  -     Iron Profile  -     Ferritin  -     Folate  -     TSH  -     T4, Free  -     T3, Free  -     Urinalysis With Microscopic - Urine, Clean Catch  -     BNP ([facility] Only)    2. Moderate protein-calorie malnutrition  -     Hemoglobin A1c  -     CBC & Differential  -     Comprehensive Metabolic Panel  -     Lipid Panel  -     Vitamin D,25-Hydroxy  -     Vitamin B12  -     Iron Profile  -     Ferritin  -     Folate  -     TSH  -     T4, Free  -     T3, Free  -     Urinalysis With Microscopic - Urine, Clean Catch  -     BNP ([facility] Only)    3. Vitamin D deficiency  -     Vitamin D,25-Hydroxy    4. B12 deficiency    5. Chronic heart failure with preserved ejection fraction  -     BNP ([facility] Only)    6. Prediabetes  -     Hemoglobin A1c    7. Gastritis, presence of bleeding unspecified, unspecified chronicity, unspecified gastritis type  -     sucralfate (Carafate) 1 g tablet; Take 1 tablet by mouth 2 (Two) Times a Day for 14 days. Indications: Stomach Ulcer  Dispense: 28 tablet; Refill: 0  -      pantoprazole (PROTONIX) 40 MG EC tablet; Take 1 tablet by mouth Daily. Indications: Indigestion  Dispense: 90 tablet; Refill: 3    Discussed high calorie diet, continue to limit salt and excess water above daily requirement.  We will give him Carafate and pantoprazole for gastric protection which may be the cause of his poor appetite nausea and vomiting.  Continue ondansetron as needed.  Will get blood work as above to look for any other nutritional needs and follow-up on underlying problems.  His effusions do sound better today.  If we can get his weight up we may be able to keep him out of the hospital for a while.  I did discuss goals of care as he has had a decline in his health overall over the past 2 years.  For now we will continue the current course.  He has a good support system, a caring and knowledgeable wife, and he does have an advanced directive on file.    Will recheck his weight in 2 to 3 weeks.    Tomas Lopez MD  Family Medicine  O: [phone]    Disclaimer: Parts of this note were dictated by speech recognition. Minor errors in transcription may be present. Please call if questions.

## 2025-01-04 LAB
25(OH)D3+25(OH)D2 SERPL-MCNC: 91.2 NG/ML (ref 30–100)
ALBUMIN SERPL-MCNC: 4.5 G/DL (ref 3.7–4.7)
ALP SERPL-CCNC: 141 IU/L (ref 44–121)
ALT SERPL-CCNC: 30 IU/L (ref 0–44)
APPEARANCE UR: CLEAR
AST SERPL-CCNC: 36 IU/L (ref 0–40)
BACTERIA #/AREA URNS HPF: NORMAL /[HPF]
BASOPHILS # BLD AUTO: 0.1 X10E3/UL (ref 0–0.2)
BASOPHILS NFR BLD AUTO: 1 %
BILIRUB SERPL-MCNC: 1.2 MG/DL (ref 0–1.2)
BILIRUB UR QL STRIP: NEGATIVE
BNP SERPL-MCNC: 515.1 PG/ML (ref 0–100)
BUN SERPL-MCNC: 31 MG/DL (ref 8–27)
BUN/CREAT SERPL: 25 (ref 10–24)
CALCIUM SERPL-MCNC: 10.4 MG/DL (ref 8.6–10.2)
CASTS URNS QL MICRO: NORMAL /LPF
CHLORIDE SERPL-SCNC: 98 MMOL/L (ref 96–106)
CHOLEST SERPL-MCNC: 147 MG/DL (ref 100–199)
CO2 SERPL-SCNC: 27 MMOL/L (ref 20–29)
COLOR UR: YELLOW
CREAT SERPL-MCNC: 1.25 MG/DL (ref 0.76–1.27)
EGFRCR SERPLBLD CKD-EPI 2021: 57 ML/MIN/1.73
EOSINOPHIL # BLD AUTO: 0.2 X10E3/UL (ref 0–0.4)
EOSINOPHIL NFR BLD AUTO: 3 %
EPI CELLS #/AREA URNS HPF: NORMAL /HPF (ref 0–10)
ERYTHROCYTE [DISTWIDTH] IN BLOOD BY AUTOMATED COUNT: 12.1 % (ref 11.6–15.4)
FERRITIN SERPL-MCNC: 661 NG/ML (ref 30–400)
FOLATE SERPL-MCNC: 7.1 NG/ML
GLOBULIN SER CALC-MCNC: 2.7 G/DL (ref 1.5–4.5)
GLUCOSE SERPL-MCNC: 118 MG/DL (ref 70–99)
GLUCOSE UR QL STRIP: NEGATIVE
HBA1C MFR BLD: 6.4 % (ref 4.8–5.6)
HCT VFR BLD AUTO: 45.2 % (ref 37.5–51)
HDLC SERPL-MCNC: 47 MG/DL
HGB BLD-MCNC: 14.5 G/DL (ref 13–17.7)
HGB UR QL STRIP: NEGATIVE
IMM GRANULOCYTES # BLD AUTO: 0 X10E3/UL (ref 0–0.1)
IMM GRANULOCYTES NFR BLD AUTO: 0 %
IRON SATN MFR SERPL: 64 % (ref 15–55)
IRON SERPL-MCNC: 192 UG/DL (ref 38–169)
KETONES UR QL STRIP: NEGATIVE
LDLC SERPL CALC-MCNC: 89 MG/DL (ref 0–99)
LEUKOCYTE ESTERASE UR QL STRIP: NEGATIVE
LYMPHOCYTES # BLD AUTO: 1.1 X10E3/UL (ref 0.7–3.1)
LYMPHOCYTES NFR BLD AUTO: 14 %
MCH RBC QN AUTO: 32.3 PG (ref 26.6–33)
MCHC RBC AUTO-ENTMCNC: 32.1 G/DL (ref 31.5–35.7)
MCV RBC AUTO: 101 FL (ref 79–97)
MICRO URNS: NORMAL
MICRO URNS: NORMAL
MONOCYTES # BLD AUTO: 0.7 X10E3/UL (ref 0.1–0.9)
MONOCYTES NFR BLD AUTO: 9 %
NEUTROPHILS # BLD AUTO: 5.7 X10E3/UL (ref 1.4–7)
NEUTROPHILS NFR BLD AUTO: 73 %
NITRITE UR QL STRIP: NEGATIVE
PH UR STRIP: 6 [PH] (ref 5–7.5)
PLATELET # BLD AUTO: 257 X10E3/UL (ref 150–450)
POTASSIUM SERPL-SCNC: 4.6 MMOL/L (ref 3.5–5.2)
PROT SERPL-MCNC: 7.2 G/DL (ref 6–8.5)
PROT UR QL STRIP: NEGATIVE
RBC # BLD AUTO: 4.49 X10E6/UL (ref 4.14–5.8)
RBC #/AREA URNS HPF: NORMAL /HPF (ref 0–2)
SODIUM SERPL-SCNC: 141 MMOL/L (ref 134–144)
SP GR UR STRIP: 1.01 (ref 1–1.03)
T3FREE SERPL-MCNC: 2.4 PG/ML (ref 2–4.4)
T4 FREE SERPL-MCNC: 1.42 NG/DL (ref 0.82–1.77)
TIBC SERPL-MCNC: 301 UG/DL (ref 250–450)
TRIGL SERPL-MCNC: 54 MG/DL (ref 0–149)
TSH SERPL DL<=0.005 MIU/L-ACNC: 1.63 UIU/ML (ref 0.45–4.5)
UIBC SERPL-MCNC: 109 UG/DL (ref 111–343)
UROBILINOGEN UR STRIP-MCNC: 0.2 MG/DL (ref 0.2–1)
VIT B12 SERPL-MCNC: 1185 PG/ML (ref 232–1245)
VLDLC SERPL CALC-MCNC: 11 MG/DL (ref 5–40)
WBC # BLD AUTO: 7.8 X10E3/UL (ref 3.4–10.8)
WBC #/AREA URNS HPF: NORMAL /HPF (ref 0–5)

## 2025-01-06 NOTE — HOME HEALTH
"Patient well know to [facility] from previous episode from 10/2/24 to 11/20/24.  Per the admitting therapist's report on 10/2/24:  \"Reason for Hosp/Primary Dx/Co-morbidities: at [facility] [id number] with hypertensive heart disease heart failure with resolved orthostatic hypotension with dehyrdation and hx of CHF/a-fib, then went to [facility] [id number]; month ago had fallen on vacation getting out of bed with hospital in [place], [place] with head laceration which he reported has healed.\"  It appears fairly quickly after  discharge, he began having more issues with his A-fib, syncope and his CHF.  He went back to the the ER twice and admitted from 11/26 to 11/30 for acute respiratory failure and CHF.  He then went to [facility] re[facility] for a few weeks.  He presents back home with paid caregivers daily.  He remains unsteady of gait and needs assist for any mobility.  WIfe states PLOF before Sept of 2024 was complete independence and then still able walk on his own just before his hospital visit on 11/26/24.     Assessment:  his gait, balance and impaired visual tracking seems to indicate neurological issues and not just weakness. His most recent CT scans of his head indicate \"moderate cerebral atrophy\" as well as chronic, B subdural hematomas.  This will obviously cause some issues that I am seeing and limit overall progress, but specific blance and mobility training should help improve his function overall.      Plan for next visit  Progress HEP with emphasis on balance with the visual system.    Continue gait safety progression."

## 2025-01-06 NOTE — HOME HEALTH
Patient just coming back from appointment with PCP, spouse states he did add a couple medications but she can't remember the names but will tell us when she picks them up from pharmacy, follow up next visit.  Lung sounds actually failry clear, dimished in the bases but patient has known bilat pleural effusions.  Weight has continued to trend down for patient, caregiver concerned about how to differentiate health weight gain from fluid gain, I discussed with her that muscle and fat gain would not occur in the form of 2lb overnight so it is still appropriate to call MD with overnight weight gain, while a more appropriate way to track healthy weight gain would be to compare average weight week to week.  Also important to take into consideration if patient is having edema or trouble breathing with weight gain.  Rest of vitals WNL.  Encouraged meal replacement shakes and increased water intake to help with BMs but also explained that if patient is not eating or drinking much that BMs are also going to be infrequent.  Encouraged cpap use as patient has nose only cpap mask so there is no vommit/aspiration risk.  Encouraged slow, concentrated, overexaggerated swallowing for less risk of aspiration, explained swallowing and aspiration mechanics.  Did add SLP per PCP note.

## 2025-01-07 ENCOUNTER — TELEPHONE (OUTPATIENT)
Dept: INTERNAL MEDICINE | Facility: CLINIC | Age: 85
End: 2025-01-07
Payer: MEDICARE

## 2025-01-07 DIAGNOSIS — K29.70 GASTRITIS, PRESENCE OF BLEEDING UNSPECIFIED, UNSPECIFIED CHRONICITY, UNSPECIFIED GASTRITIS TYPE: ICD-10-CM

## 2025-01-07 NOTE — TELEPHONE ENCOUNTER
Caller: Travis Boo    Relationship: Emergency Contact    Best call back number: [phone]     What was the call regarding: PATIENT'S WIFE STATED THAT PATIENT HAS BEEN TAKING SUCRALFATE TO COAT HIS STOMACH AND HE HAS STOPPED HAVING PROBLEMS WITH NAUSEA AND HAS REGAINED HIS APPETITE. BECAUSE OF THIS, HE HAS NOT TAKEN ANY OF THE pantoprazole (PROTONIX) 40 MG EC tablet. PATIENT'S WIFE IS ASKING IF THERE IS ANY REASON TO GIVE HIM THE PANTOPRAZOLE. PLEASE ADVISE.

## 2025-01-08 ENCOUNTER — HOME CARE VISIT (OUTPATIENT)
Dept: HOME HEALTH SERVICES | Facility: HOME HEALTHCARE | Age: 85
End: 2025-01-08
Payer: MEDICARE

## 2025-01-08 VITALS
HEART RATE: 72 BPM | TEMPERATURE: 97.7 F | SYSTOLIC BLOOD PRESSURE: 120 MMHG | OXYGEN SATURATION: 98 % | DIASTOLIC BLOOD PRESSURE: 81 MMHG

## 2025-01-08 PROCEDURE — G0152 HHCP-SERV OF OT,EA 15 MIN: HCPCS

## 2025-01-08 PROCEDURE — G0151 HHCP-SERV OF PT,EA 15 MIN: HCPCS

## 2025-01-08 RX ORDER — PANTOPRAZOLE SODIUM 40 MG/1
40 TABLET, DELAYED RELEASE ORAL DAILY
Qty: 90 TABLET | Refills: 3 | Status: SHIPPED | OUTPATIENT
Start: 2025-01-08

## 2025-01-08 NOTE — TELEPHONE ENCOUNTER
Yes please do the pantoprazole as well  Will prevent this situation from recurring once carafate is complete

## 2025-01-09 ENCOUNTER — HOME CARE VISIT (OUTPATIENT)
Dept: HOME HEALTH SERVICES | Facility: HOME HEALTHCARE | Age: 85
End: 2025-01-09
Payer: MEDICARE

## 2025-01-09 PROCEDURE — G0153 HHCP-SVS OF S/L PATH,EA 15MN: HCPCS

## 2025-01-09 NOTE — REMOTE PATIENT MONITORING NOTE
Nurse spoke to patient's wife via phone. Wife made aware no SNV d/t inclement weather. Patient's wife reported he has all medications, food and backup incase electricity goes out. Patient wife reported, Patient continue to be weak and therapy has evaluated.

## 2025-01-09 NOTE — CASE COMMUNICATION
Patient missed a SN visit from [facility] on 1/8/25.     Reason: Nurse spoke to patient's wife via phone. Wife made aware no SNV d/t inclement weather.      For your records only.   Per CMS Guidance, MD must be notified of missed/cancelled visits; therefore the prescribed frequency was not met.

## 2025-01-09 NOTE — Clinical Note
Speech evaluation completed 1/9/25 with recommendations for home skilled ST 1wk4 with treatment focus on swallow and cognitive communication deficits.     Recommend consider referral for VFSS (video fluroscopic swallow study) for further assessment of swallow function, risks for aspiration and diet recommendations.       Vandana Ramirez MS, CCC-SLP  [phone]
Pia Cloud(Attending)

## 2025-01-09 NOTE — HOME HEALTH
OT visit earlier today.  No falls per patient and caregivers.  Paid caregiver present today.  No new complaints voiced.      Plan for next visit  Progress HEP as tolerated.   Continue home safety teaching  Gait with and without AD as part of balance retraining.

## 2025-01-10 ENCOUNTER — HOME CARE VISIT (OUTPATIENT)
Dept: HOME HEALTH SERVICES | Facility: HOME HEALTHCARE | Age: 85
End: 2025-01-10
Payer: MEDICARE

## 2025-01-10 VITALS
HEART RATE: 55 BPM | DIASTOLIC BLOOD PRESSURE: 82 MMHG | OXYGEN SATURATION: 95 % | TEMPERATURE: 97.4 F | SYSTOLIC BLOOD PRESSURE: 116 MMHG

## 2025-01-10 VITALS
HEART RATE: 70 BPM | TEMPERATURE: 96 F | RESPIRATION RATE: 18 BRPM | OXYGEN SATURATION: 97 % | SYSTOLIC BLOOD PRESSURE: 118 MMHG | DIASTOLIC BLOOD PRESSURE: 82 MMHG

## 2025-01-10 PROCEDURE — G0152 HHCP-SERV OF OT,EA 15 MIN: HCPCS

## 2025-01-10 PROCEDURE — G0299 HHS/HOSPICE OF RN EA 15 MIN: HCPCS

## 2025-01-13 ENCOUNTER — HOME CARE VISIT (OUTPATIENT)
Dept: HOME HEALTH SERVICES | Facility: HOME HEALTHCARE | Age: 85
End: 2025-01-13
Payer: MEDICARE

## 2025-01-13 VITALS — HEART RATE: 60 BPM | SYSTOLIC BLOOD PRESSURE: 120 MMHG | OXYGEN SATURATION: 98 % | DIASTOLIC BLOOD PRESSURE: 60 MMHG

## 2025-01-13 VITALS
RESPIRATION RATE: 18 BRPM | DIASTOLIC BLOOD PRESSURE: 82 MMHG | TEMPERATURE: 97.4 F | HEART RATE: 55 BPM | SYSTOLIC BLOOD PRESSURE: 116 MMHG | OXYGEN SATURATION: 95 %

## 2025-01-13 PROCEDURE — G0299 HHS/HOSPICE OF RN EA 15 MIN: HCPCS

## 2025-01-13 NOTE — HOME HEALTH
Patient has to be discharged for coverage change and readmitted next week.  CP assessment WNL today.  Patient reports feeling well overall.  Patient is slowly gaining weight, which is intentional due to significant weight loss, no concerning for fluid gain still do to slow nature of weight gain and lack of edema and trouble breathing.  Patient reports doing well overall.

## 2025-01-13 NOTE — HOME HEALTH
"SOC Note: patient lives at home with wife and caregiver, insurance change, patient discharge from agency and SOC completed today.     Home Health ordered for: disciplines SN, PT, OT, SLP    Reason for Hosp/Primary Dx/Co-morbidities: REsp failure    Focus of Care: CHF    Patient's goal(s): \"to get stronger\"    Current Functional status/mobility/DME: CPAP, blood pressure cuff, o2 monitor, walker    HB status/Living Arrangements: lives at home with wife, and private duty care taker    Skin Integrity/wound status: no wounds    Code Status: FULL CODE     Fall Risk/Safety concerns: YES"

## 2025-01-13 NOTE — HOME HEALTH
REASON FOR REFERRAL: Pt is an 84 year old male referred to home health services following 2 recent hospitalizations over the last 2 months. On 11/22 he saw PCP for cough, confusion, fatigue, and decreased urine output. Had chest xray performed and syncope episode occurred which prompted a ER visit and diagnosed with generalized weakness and nausea and vomiting.  On 11/26 he returned back to [facility] for SOA where he was hospitalized until 11/30 for acute hypoxic respiratory failure, acute on chronic diastolic heart failure, afib with rapid ventricular response, HTN. He was transferred to [facility] for rehab and discharged from there on 12/23. Patient/spouse report that his initial decline started in September when they went on a trip to [place], [place] and pt believed he had food poisoning. He leaned out of bed to vomit and fell out of bed suffering a head injury requiring additional hospitalization. Pt/spouse report pt has had progressive difficulty swallowing over the past year as well as significant weight loss.     PRIMARY DIAGNOSIS: Hypertensive heart disease with heart failure    VFSS OR FEES: n/a     PERTINENT HISTORY: Afib, HTN,TIFFANY, anxiety, subdural hematoma    PRIOR LEVEL OF FUNCTION/LIVING SITUATION: Pt lives at home with spouse. He has a hired caregiver for approximately 5 hours daily to assist with bathing, toileting, light house work, meal prep due to patient's balance and cognitive deficits.     EVALUATION SUMMARY/FOCUS OF CARE: Pt presents with suspected dysphagia with reports of s/s aspiration with both liquids and solids. Pt reports that it is a nuisance and he tends to do better if looking up/titling head back and when drinking without a straw. Pt with significant weight loss, he supplements with protein/nutrition shakes daily. Pt reports increased difficulty swallowing dry foods such as crackers. Pt PO trials of thin liquids via cup, no clinical s/s aspiraiton. PO trials of wheat thin  crackers with good mastication, small bites and slow rate. Pt observed to use spontaneous double swallow at times with subtle throat clear. Per spouse has noticed improvement wiht swallow since start of carafate and protonix. Recommend VFSS for instrumental assessment of swallow for diet recommendations and aspiration risk. Pt also presents with cognitive communication deficits including difficulty with short term recall, problem solving and safety awareness. Further diagnostic treatment for cognitive communication indicated.     PATIENT'S GOAL: to improve swallow and nutrition    PATIENT/CAREGIVER RESPONSE TO SKILLED INTERVENTION PROVIDED:  SLP provided education to patient and spouse regarding s/s aspiration, risks for pneumonia, and safe swallow compensatory strategies. SLP recommend patient consider soft/slick diet with avoidance fo foods that are difficulty for him to swallow. Pt and spouse verbalized understanding, further teaching indicated. SLP also provided education regarding use of external aids as memory strategies.     MEDICAL NECESSITY FOR ONGOING SKILLED THERAPY: Dysphagia therapy and education for safe oral intake of least restrictive diet with reduced risk of aspiration and/or pneumonia.   -Skilled speech therapy interventions are necessary for increased safety, communication and function in the home due to a decline in cognitive skills and increased dependence on caregivers.     PLAN/SPECIFIC INTERVENTIONS AND GOALS TO ADDRESS ON NEXT VISIT: safe swallow strategies, memory/recall strategies, cog/comm dx tx    FREQUENCY AND DURATION: 1wk4  ANY OTHER FOLLOW UP NEEDED: VFSS

## 2025-01-13 NOTE — Clinical Note
Hello, I am verifying that you will be signing off on Home Health skilled nursing orders.   Thank you   Juany DELUNA   [phone]

## 2025-01-14 ENCOUNTER — HOME CARE VISIT (OUTPATIENT)
Dept: HOME HEALTH SERVICES | Facility: HOME HEALTHCARE | Age: 85
End: 2025-01-14
Payer: MEDICARE

## 2025-01-14 PROCEDURE — G0151 HHCP-SERV OF PT,EA 15 MIN: HCPCS

## 2025-01-15 ENCOUNTER — HOME CARE VISIT (OUTPATIENT)
Dept: HOME HEALTH SERVICES | Facility: HOME HEALTHCARE | Age: 85
End: 2025-01-15
Payer: MEDICARE

## 2025-01-15 VITALS
TEMPERATURE: 97.4 F | OXYGEN SATURATION: 94 % | SYSTOLIC BLOOD PRESSURE: 120 MMHG | DIASTOLIC BLOOD PRESSURE: 80 MMHG | HEART RATE: 62 BPM

## 2025-01-15 VITALS
SYSTOLIC BLOOD PRESSURE: 114 MMHG | OXYGEN SATURATION: 97 % | DIASTOLIC BLOOD PRESSURE: 73 MMHG | HEART RATE: 45 BPM | TEMPERATURE: 97.3 F

## 2025-01-15 PROCEDURE — G0152 HHCP-SERV OF OT,EA 15 MIN: HCPCS

## 2025-01-15 NOTE — HOME HEALTH
REASON FOR REFERRAL: Pt is an 85 y/o male who has had 2 hospitalizations over the last 2 months. On 11/22 he saw PCP for cough, confusion, fatigue, and decreased urine output. Had chest xray performed and syncope episode occurred which prompted a ER visit and diagnosed with generalized weakness and nausea and vomiting. They were given the option for hospitalization to monitor but both patient and spouse declined at that time. On 11/26 he returned back to [facility] for SOA where he was hospitalized until 11/30 for acute hypoxic respiratory failure, acute on chronic diastolic heart failure, afib with rapid ventricular response, HTN. He was transferred to [facility] for rehab and discharged from there on 12/23. Pt recieved  OT services from 12/27 to 1/12 and resumed this week under new payor.   PMHx: Afib, HTN,TIFFANY, anxiety, subdural hematoma.   OT's FOCUS OF CARE: dynamic balance, AE use, light meal prep  SUBJECTIVE: Spouse reports pt has maintained increase appetite, energy, and improved balance.     SOCIAL & ENVIRONMENTAL SITUATION: Pt lives in a [facility] with his spouse and hired caregivers at times. Caregivers mainly assist with bathing and supervising mobility related to ADLs. Pt has a walk in shower, grab bars, built in seat, and  shower head. He has been getting dressed supine due to his light headed/dizziness.   PATIENT'S &/OR CAREGIVER'S GOAL: Spouse wishes for pt's overall balance to improve. Pt wishes to be able to toilet and walk around the home on his own.  INTERVENTIONS: dynamic balance, activity tolerance, LBD strategies, toileting strategies, functional mobility, cognitive carryover of recommended strategies  ASSESSMENT: Pt would benefit from skilled OT services to progress I dynamic balance, light meal prep, and functional mobility within the home to reduce risk of falls and optimize QOL.  MEDICAL NECESSITY: Skilled OT services will progress patient I by establishing safe routines including  functional t/fs, functional mobility within the home, strength, activity tolerance, pt/caregiver education, and AD/AE/DME recommendations. Skilled OT services are indicated in order to reduce the patient's BOC and improve overall I so that patient may safely age in place.  PLAN: OT to tx 1W2.   PLAN FOR NEXT VISIT: dynamic balance, activity tolerance, safety awareness and mobility related to ADLs.

## 2025-01-15 NOTE — HOME HEALTH
"This is a new eval only because his MC number changed.  My initial eval note on 1/3/25 is still pertinent:   \"Patient well know to [facility] from previous episode from 10/2/24 to 11/20/24. Per the admitting therapist's report on 10/2/24: \"Reason for Hosp/Primary Dx/Co-morbidities: at [facility] [id number] with hypertensive heart disease heart failure with resolved orthostatic hypotension with dehyrdation and hx of CHF/a-fib, then went to [facility] [id number]; month ago had fallen on vacation getting out of bed with hospital in [place], [place] with head laceration which he reported has healed.\" It appears fairly quickly after  discharge, he began having more issues with his A-fib, syncope and his CHF. He went back to the the ER twice and admitted from 11/26 to 11/30 for acute respiratory failure and CHF. He then went to [place] rehab for a few weeks. He presents back home with paid caregivers daily. He remains unsteady of gait and needs assist for any mobility. WIfe states PLOF before Sept of 2024 was complete independence and then still able walk on his own just before his hospital visit on 11/26/24.     Assessment: his gait, balance and impaired visual tracking still indicate neurological issues and not just weakness. His most recent CT scans of his head indicate \"moderate cerebral atrophy\" as well as chronic, B subdural hematomas. He does exhibit weakness in his posterior chain, glutes and calves specifically.  I am surprised that he has improved since 1/3/25 and making progress toward goals. While he will continue to need supervision, his progress indicates that he may regain some of his prior level of function.     Plan for next visit  Continue to work on targeted standing/balance/strengthening exercises."

## 2025-01-17 ENCOUNTER — HOME CARE VISIT (OUTPATIENT)
Dept: HOME HEALTH SERVICES | Facility: HOME HEALTHCARE | Age: 85
End: 2025-01-17
Payer: MEDICARE

## 2025-01-17 VITALS
DIASTOLIC BLOOD PRESSURE: 78 MMHG | SYSTOLIC BLOOD PRESSURE: 108 MMHG | TEMPERATURE: 97.3 F | OXYGEN SATURATION: 98 % | HEART RATE: 62 BPM

## 2025-01-17 PROCEDURE — G0152 HHCP-SERV OF OT,EA 15 MIN: HCPCS

## 2025-01-17 PROCEDURE — G0151 HHCP-SERV OF PT,EA 15 MIN: HCPCS

## 2025-01-20 ENCOUNTER — HOME CARE VISIT (OUTPATIENT)
Dept: HOME HEALTH SERVICES | Facility: HOME HEALTHCARE | Age: 85
End: 2025-01-20
Payer: MEDICARE

## 2025-01-20 VITALS — TEMPERATURE: 97.5 F | DIASTOLIC BLOOD PRESSURE: 80 MMHG | SYSTOLIC BLOOD PRESSURE: 116 MMHG | HEART RATE: 60 BPM

## 2025-01-20 PROCEDURE — G0299 HHS/HOSPICE OF RN EA 15 MIN: HCPCS

## 2025-01-20 NOTE — HOME HEALTH
Addended by: LION HOSKINS on: 9/18/2018 08:19 PM     Modules accepted: Orders     Paid caregiver present.  Patient up in chair when I arrived, states he continues to feel better.  No falls reported.  Good HEP carryover reported.      Plan for next visit  Progress HEP for balance and better gait mechanics.

## 2025-01-21 ENCOUNTER — HOME CARE VISIT (OUTPATIENT)
Dept: HOME HEALTH SERVICES | Facility: HOME HEALTHCARE | Age: 85
End: 2025-01-21
Payer: MEDICARE

## 2025-01-21 PROCEDURE — G0153 HHCP-SVS OF S/L PATH,EA 15MN: HCPCS

## 2025-01-21 PROCEDURE — G0151 HHCP-SERV OF PT,EA 15 MIN: HCPCS

## 2025-01-22 ENCOUNTER — HOME CARE VISIT (OUTPATIENT)
Dept: HOME HEALTH SERVICES | Facility: HOME HEALTHCARE | Age: 85
End: 2025-01-22
Payer: MEDICARE

## 2025-01-22 VITALS
SYSTOLIC BLOOD PRESSURE: 102 MMHG | DIASTOLIC BLOOD PRESSURE: 75 MMHG | OXYGEN SATURATION: 100 % | TEMPERATURE: 97.9 F | HEART RATE: 72 BPM

## 2025-01-22 VITALS — DIASTOLIC BLOOD PRESSURE: 80 MMHG | TEMPERATURE: 97.5 F | SYSTOLIC BLOOD PRESSURE: 116 MMHG | HEART RATE: 60 BPM

## 2025-01-22 PROCEDURE — G0152 HHCP-SERV OF OT,EA 15 MIN: HCPCS

## 2025-01-22 NOTE — HOME HEALTH
Wife reports patient lost his balance (twice) in the kitchen, each time catching himself at the counter.  Overall, she feels he is doing better.  Speech therapy did her evaluation today and will be following for a couple of weeks.     Plan for next visit  Continue work on better balance reactions and safer upright mobility with HEP amendments and neuro reeducation activities.

## 2025-01-22 NOTE — Clinical Note
Dear Dr. Lopez,   OT DC'd pt this date 1/22/25 with all goals met. Pt is at max potential requiring supervision/SBA for ADLs/IADLs. Pt has strong assistance from family/caregivers and HH PT remains in the home.  Thank you,   Greta Khan OTR/L

## 2025-01-23 ENCOUNTER — HOME CARE VISIT (OUTPATIENT)
Dept: HOME HEALTH SERVICES | Facility: HOME HEALTHCARE | Age: 85
End: 2025-01-23
Payer: MEDICARE

## 2025-01-23 PROCEDURE — G0151 HHCP-SERV OF PT,EA 15 MIN: HCPCS

## 2025-01-23 NOTE — HOME HEALTH
From a nursing standpoint patient has met all goals, CHF is stable, spouse is managing medication and is knowledgable.  They know to call MD for campos further questions or concerns or that SN can be adding back as long as someone from the agency is still providing services.  CP assessment WNL.  Patient and spouse counseled on methods for better CPAP compliance.  Patient and spouse agreeable to SN discharge.

## 2025-01-24 ENCOUNTER — OFFICE VISIT (OUTPATIENT)
Dept: INTERNAL MEDICINE | Facility: CLINIC | Age: 85
End: 2025-01-24
Payer: MEDICARE

## 2025-01-24 VITALS
DIASTOLIC BLOOD PRESSURE: 78 MMHG | HEIGHT: 70 IN | OXYGEN SATURATION: 95 % | TEMPERATURE: 97 F | SYSTOLIC BLOOD PRESSURE: 112 MMHG | WEIGHT: 151.4 LBS | BODY MASS INDEX: 21.67 KG/M2 | HEART RATE: 58 BPM

## 2025-01-24 VITALS — HEART RATE: 60 BPM | SYSTOLIC BLOOD PRESSURE: 116 MMHG | TEMPERATURE: 97.5 F | DIASTOLIC BLOOD PRESSURE: 60 MMHG

## 2025-01-24 VITALS
HEART RATE: 74 BPM | OXYGEN SATURATION: 100 % | SYSTOLIC BLOOD PRESSURE: 110 MMHG | TEMPERATURE: 97.4 F | DIASTOLIC BLOOD PRESSURE: 78 MMHG

## 2025-01-24 DIAGNOSIS — I50.32 CHRONIC HEART FAILURE WITH PRESERVED EJECTION FRACTION: ICD-10-CM

## 2025-01-24 DIAGNOSIS — R13.10 DYSPHAGIA, UNSPECIFIED TYPE: ICD-10-CM

## 2025-01-24 DIAGNOSIS — G47.33 OSA (OBSTRUCTIVE SLEEP APNEA): ICD-10-CM

## 2025-01-24 DIAGNOSIS — R41.3 MEMORY IMPAIRMENT: ICD-10-CM

## 2025-01-24 DIAGNOSIS — R63.4 WEIGHT LOSS, UNINTENTIONAL: Primary | ICD-10-CM

## 2025-01-24 PROCEDURE — 3074F SYST BP LT 130 MM HG: CPT | Performed by: FAMILY MEDICINE

## 2025-01-24 PROCEDURE — 3078F DIAST BP <80 MM HG: CPT | Performed by: FAMILY MEDICINE

## 2025-01-24 PROCEDURE — G2211 COMPLEX E/M VISIT ADD ON: HCPCS | Performed by: FAMILY MEDICINE

## 2025-01-24 PROCEDURE — 99215 OFFICE O/P EST HI 40 MIN: CPT | Performed by: FAMILY MEDICINE

## 2025-01-24 RX ORDER — BUMETANIDE 1 MG/1
TABLET ORAL
Status: SHIPPED | COMMUNITY
Start: 2025-01-24

## 2025-01-24 NOTE — HOME HEALTH
"Patient in chair when I arrived, caregivers present.  He states he has been doing \"a lot of walking.\"  He presented today with more steady gait and overall better balance.  He is going out today in the community with his wife.      Plan for next visit  Continue balance oriented activities and exercises to improve his falls risk and decrease the assistance he needs."

## 2025-01-24 NOTE — PROGRESS NOTES
Date of Encounter: 2025  Patient: Gabo Boo,  1940    Subjective   History of Presenting Illness  Chief complaint: Follow-up weight loss    Patient presents for follow-up weight loss.  Fortunately his previous nausea and reduced appetite improved rapidly with Carafate and pantoprazole. fluids with about a 10 pound weight gain over the past 3 weeks.  He has been adherent to his bumetanide 1 mg daily, although wife reports frequent urination at night the patient seems less bothered by than her.  She did try reducing bumetanide to 0.5 mg every other day and has not noticed any significant regression in his breathing symptoms.  He also had recent adjustment of his CPAP to 14 cm of water.    They did mention recent home health speech pathology evaluation.  No symptoms of aspiration with thin liquids with double swallow with subtle throat clears with wheat thin crackers.  They did recommend a VFSS for further evaluation.  They also discussed compensatory strategies and dietary modification.    They do note persistent cognitive impairment over the past 6 months.  This has been concordant with his bilateral subdural hematomas and recent CHF exacerbations.  MRI of the brain from  demonstrated chronic senescent changes with cortical atrophy that was stable compared to previous imaging.    The following portions of the patient's history were reviewed and updated as appropriate: allergies, current medications, past family history, past medical history, past social history, past surgical history and problem list.    Patient Active Problem List   Diagnosis    Anxiety    Hyperlipidemia    Resistant hypertension    Insomnia    Prediabetes    Focal epilepsy    Hygroma    Seizure    Partial seizures    Vitamin D deficiency    H/O lead exposure    Paroxysmal atrial fibrillation    Hx of adenomatous polyp of colon    Presence of Watchman left atrial appendage closure device    Ascending aortic aneurysm     Tricuspid regurgitation    Chronic heart failure with preserved ejection fraction    Atrial fibrillation with RVR    Bilateral pleural effusion    TIFFANY (obstructive sleep apnea) on CPAP    On supplemental oxygen therapy at night    Bilateral carotid artery disease    Gynecomastia    Pulmonary embolism without acute cor pulmonale    Chronic subdural hematoma     Past Medical History:   Diagnosis Date    Abnormal barium swallow 07/08/2016    HH, reflux    ADHD (attention deficit hyperactivity disorder)     AF (paroxysmal atrial fibrillation)     Anemia 03/21/2021    Anxiety     Bilateral lower extremity edema     CHF (congestive heart failure) 6-2023    Coronary artery disease     GERD (gastroesophageal reflux disease)     H/O cataract     Dr Heron Hoffman    Hernia, epigastric 10/14/2020    Added automatically from request for surgery [id number]    Hiatal hernia     Hyperlipidemia     Hypertension     Internal hemorrhoids     Mood disorder 07/27/2017    PONV (postoperative nausea and vomiting)     Seizures     July 2013 was last seizure    Tubular adenoma of colon     Umbilical hernia without obstruction and without gangrene 10/14/2020    Added automatically from request for surgery [id number]     Past Surgical History:   Procedure Laterality Date    CARDIAC SURGERY      CATARACT EXTRACTION Bilateral     COLONOSCOPY  04/23/2013    Dr Galeano/Maddison, Ih, tubular adenoma w/low grade dysplasia    COLONOSCOPY N/A 11/03/2020    Procedure: COLONOSCOPY;  Surgeon: Gabo Power MD;  Location: [facility] MAIN OR;  Service: General;  Laterality: N/A;    ENDOSCOPY N/A 12/16/2016    Procedure: ESOPHAGOGASTRODUODENOSCOPY WITH COLD BIOPSIES AND 54 FR KENNY DILATATION;  Surgeon: Shiraz Galeano MD;  Location: [facility] ENDOSCOPY;  Service:     INGUINAL HERNIA REPAIR      as a child and again in 1970 approx.    KRISTYN Left 03/2019    Left Atrial Appendage Occlusion W KRISTYN    TONSILLECTOMY      VENTRAL/INCISIONAL HERNIA REPAIR N/A 11/03/2020     Procedure: VENTRAL HERNIA REPAIR WITH MESH;  Surgeon: Gabo Power MD;  Location: [facility] OR;  Service: General;  Laterality: N/A;    WRIST SURGERY Right      Family History   Problem Relation Age of Onset    Stroke Father     COPD Father     Thyroid disease Daughter     Thyroid disease Daughter     Malig Hyperthermia Neg Hx        Current Outpatient Medications:     Astaxanthin 4 MG capsule, Take 1 tablet by mouth Daily. Indications: supplement, Disp: , Rfl:     B Complex Vitamins (VITAMIN B COMPLEX) capsule capsule, Take 1 capsule by mouth Daily. Indications: SUPPLEMENT, Disp: , Rfl:     Berberine Chloride (BERBERINE HCI PO), Take 1 dose by mouth Daily. Indications: unknown, Disp: , Rfl:     bumetanide (BUMEX) 1 MG tablet, Take 1mg daily alternating with 0.5mg daily every other day  Indications: Edema, Disp: , Rfl:     Coenzyme Q10 (CoQ10) 100 MG capsule, Take 1 capsule by mouth 2 (Two) Times a Day. WITH OBQUINOL IN IT.   Indications: heart failure, Disp: , Rfl:     dilTIAZem CD (CARDIZEM CD) 120 MG 24 hr capsule, Take 1 capsule by mouth Daily. Indications: a. fib., Disp: , Rfl:     lacosamide (Vimpat) 200 MG tablet, Take 1 tablet by mouth 2 (Two) Times a Day. TAKES BRAND VIMPAT  Indications: Seizures that are Not Controlled, Disp: , Rfl:     Lactobacillus (PROBIOTIC ACIDOPHILUS PO), Take 1 tablet by mouth Daily. Indications: general wellness, Disp: , Rfl:     Magnesium Oxide 400 MG capsule, Take 200 mg by mouth Daily. 1/2 TABLET DAILY  Indications: UNKNOWN, Disp: , Rfl:     Melatonin 3 MG tablet dispersible, Place 1 tablet on the tongue Every Night. Indications: SLEEP, Disp: , Rfl:     metoprolol succinate XL (TOPROL-XL) 50 MG 24 hr tablet, Take 1 tablet by mouth 2 (Two) Times a Day. Indications: Atrial Fibrillation (Patient taking differently: Take 2 tablets by mouth 2 (Two) Times a Day. Indications: Atrial Fibrillation), Disp: 180 tablet, Rfl: 3    Misc Natural Products (CORTISOL MANAGER PO), Take 1  "tablet by mouth Daily. Indications: stress hormone stabilzer, Disp: , Rfl:     Multiple Vitamin (MULTIVITAMIN PO), Take 1 tablet by mouth Daily. Indications: SUPPLEMENT, Disp: , Rfl:     Nattokinase 100 MG capsule, Take 200 mg by mouth Daily. Indications: general wellness, Disp: , Rfl:     Omega-3 Fatty Acids (OMEGA-3 FISH OIL PO), Take 1 capsule by mouth Daily. Indications: general wellness, Disp: , Rfl:     pantoprazole (PROTONIX) 40 MG EC tablet, Take 1 tablet by mouth Daily. Indications: Indigestion, Disp: 90 tablet, Rfl: 3    Probiotic Product (PROBIOTIC DAILY PO), Take 1 dose by mouth Daily. Indications: digestive health, Disp: , Rfl:     Psyllium (METAMUCIL PO), Take 1 dose by mouth Daily. Indications: constipation, Disp: , Rfl:     spironolactone (ALDACTONE) 25 MG tablet, Take 0.5 tablets by mouth Daily. Indications: heart failure, Disp: , Rfl:     Unable to find, Take 1 each by mouth Daily. Ultrabrain  Indications: mental health, Disp: , Rfl:     Current Facility-Administered Medications:     cyanocobalamin injection 1,000 mcg, 1,000 mcg, Intramuscular, Q28 Days, Tomas Lopez MD  No Known Allergies  Social History     Tobacco Use    Smoking status: Never    Smokeless tobacco: Never   Vaping Use    Vaping status: Never Used   Substance Use Topics    Alcohol use: Yes     Alcohol/week: 1.0 standard drink of alcohol     Types: 1 Cans of beer per week     Comment: daily    Drug use: Never          Objective   Physical Exam  Vitals:    01/24/25 1504   BP: 112/78   Pulse: 58   Temp: 97 °F (36.1 °C)   TempSrc: Infrared   SpO2: 95%   Weight: 68.7 kg (151 lb 6.4 oz)   Height: 177.8 cm (70\")     Body mass index is 21.72 kg/m².    Constitutional: NAD.  Cardiovascular: Irregularly irregular. No murmurs. No LE edema b/l. Radial pulses 1+ bilaterally.  Pulmonary: Slightly diminished breath sounds in the right lower lung field but this is better than his previous examinations.  No crackles or coarseness.  Good " effort.  Integumentary: No rashes or wounds on face or upper extremities.  Psychiatric: Looks to wife for answers frequently.  Some word finding difficulty.  Speech pattern slightly slowed.  He is oriented to person and place     Assessment & Plan   Assessment and Plan  Pleasant 84-year-old male with seizure disorder, traumatic bilateral subdural hematomas, paroxysmal atrial fibrillation with watchman device and recurrent RVR, heart failure with preserved ejection fraction and bilateral pleural effusions, ascending aortic aneurysm, resistant hypertension, hyperlipidemia, history of prediabetes, vitamin D deficiency, insomnia, among other problems, who presents for the following:     Diagnoses and all orders for this visit:    1. Weight loss, unintentional (Primary): Improving.  Continue to monitor daily weights and if increasing rapidly may be symptom of CHF exacerbation.  He does not develop peripheral edema or other obvious symptoms of CHF exacerbation other than increasing shortness of breath, elevated BNP.  He has been doing well with recent bumetanide dosing.  I am hesitant to decrease it significantly.  Discussed marginal dose decreased to 1 mg alternating with 0.5 mg every other day.  Discussed daily weights and other reasons to consider going back to 1 mg/day dosing.  I would like to follow-up with him in 6 weeks.  2. Chronic heart failure with preserved ejection fraction    3. Dysphagia, unspecified type: At this time I think the quality of life benefits of minimally restricted diet outweigh further changes that may be recommended after VLSS.  Additionally, his symptoms are improving with PPI treatment which I would like him to continue.  He does have an EGD in 2016.  I reviewed compensatory strategies and we will revisit this in 6 weeks.  I have also sent a message to speech and language pathologist discussing the strategy.    4. Memory impairment: Multiple etiologies of his recent cognitive decline.  He  does have known senescent brain changes, on top of this a bilateral subdural hematoma within the past 4 months and multiple CHF exacerbations with suboptimal CPAP pressure at night until recently.  He does have risk factors for progressive neurocognitive impairment, but with his current comorbidities he is not a candidate for antiamyloid therapy even if Alzheimer's disease were confirmed with cerebrospinal plaque testing.  My preference is to follow-up in 6 weeks to see if cognition is stabilizing with temporary reprieve in illness episodes.  We can consider initiation of donepezil or memantine if needed.  If further changing will update imaging of the head but subdural hematomas were stable on last imaging.    5. TIFFANY (obstructive sleep apnea) on CPAP    Spend 50 min with pt, reviewing chart, composing this note    Tomas Lopez MD  Family Medicine  O: [phone]    Disclaimer: Parts of this note were dictated by speech recognition. Minor errors in transcription may be present. Please call if questions.

## 2025-01-24 NOTE — HOME HEALTH
REASON FOR REFERRAL: Pt is an 84 year old male referred to home health services following 2 recent hospitalizations over the last 2 months. On 11/22 he saw PCP for cough, confusion, fatigue, and decreased urine output. Had chest xray performed and syncope episode occurred which prompted a ER visit and diagnosed with generalized weakness and nausea and vomiting.  On 11/26 he returned back to [facility] for SOA where he was hospitalized until 11/30 for acute hypoxic respiratory failure, acute on chronic diastolic heart failure, afib with rapid ventricular response, HTN. He was transferred to [facility] for rehab and discharged from there on 12/23. Patient/spouse report that his initial decline started in September when they went on a trip to [place], [place] and pt believed he had food poisoning. He leaned out of bed to vomit and fell out of bed suffering a head injury requiring additional hospitalization. Pt/spouse report pt has had progressive difficulty swallowing over the past year as well as significant weight loss.     PRIMARY DIAGNOSIS: Hypertensive heart disease with heart failure    VFSS OR FEES: n/a     PERTINENT HISTORY: Afib, HTN,TIFFANY, anxiety, subdural hematoma    PRIOR LEVEL OF FUNCTION/LIVING SITUATION: Pt lives at home with spouse. He has a hired caregiver for approximately 5 hours daily to assist with bathing, toileting, light house work, meal prep due to patient's balance and cognitive deficits.     EVALUATION SUMMARY/FOCUS OF CARE: Pt seen for new initial evaluation today due to change in his insurance. His spouse reports that he is doing better walking and eating better. He is still coughing some but not often and it seems to be if he doesn't think about it and takes large drinks. Pt continues to present with suspected dysphagia with reports of s/s aspiration with both liquids and solids. Pt reported that it is a nuisance and he tends to do better if looking up/titling head back and when  drinking without a straw. Pt with significant weight loss, he supplements with protein/nutrition shakes daily. Pt reports increased difficulty swallowing dry foods such as crackers. Pt PO trials of thin liquids via cup, no clinical s/s aspiraiton. PO trials of wheat thin crackers with good mastication, small bites and slow rate. Pt observed to use spontaneous double swallow at times with subtle throat clear. Per spouse has noticed improvement wiht swallow since start of carafate and protonix. Recommend VFSS for instrumental assessment of swallow for diet recommendations and aspiration risk. Pt also presents with cognitive communication deficits including difficulty with short term recall, attention, problem solving and safety awareness. Pt completed SLUMS with total score of 23/30.       PATIENT'S GOAL: to improve swallow/nutrition, memory and to drive again.     PATIENT/CAREGIVER RESPONSE TO SKILLED INTERVENTION PROVIDED:  SLP provided education to patient and spouse regarding s/s aspiration, risks for pneumonia, and safe swallow compensatory strategies. SLP recommend patient consider soft/slick diet with avoidance fo foods that are difficulty for him to swallow. Pt and spouse verbalized understanding, further teaching indicated. SLP also provided education regarding use of external aids as memory strategies. SLP recommend patient discuss driving with physician.     MEDICAL NECESSITY FOR ONGOING SKILLED THERAPY: Dysphagia therapy and education for safe oral intake of least restrictive diet with reduced risk of aspiration and/or pneumonia. Skilled speech therapy interventions are necessary for increased safety, communication and function in the home due to a decline in cognitive skills and increased dependence on caregivers.     PLAN/SPECIFIC INTERVENTIONS AND GOALS TO ADDRESS ON NEXT VISIT: safe swallow strategies, memory/recall strategies, cog/comm dx tx    FREQUENCY AND DURATION: 1wk4  ANY OTHER FOLLOW UP NEEDED:  VFSS, PCP

## 2025-01-27 ENCOUNTER — HOME CARE VISIT (OUTPATIENT)
Dept: HOME HEALTH SERVICES | Facility: HOME HEALTHCARE | Age: 85
End: 2025-01-27
Payer: MEDICARE

## 2025-01-27 VITALS
OXYGEN SATURATION: 99 % | DIASTOLIC BLOOD PRESSURE: 82 MMHG | SYSTOLIC BLOOD PRESSURE: 120 MMHG | RESPIRATION RATE: 18 BRPM | TEMPERATURE: 97 F | HEART RATE: 56 BPM

## 2025-01-27 PROCEDURE — G0153 HHCP-SVS OF S/L PATH,EA 15MN: HCPCS

## 2025-01-28 ENCOUNTER — HOME CARE VISIT (OUTPATIENT)
Dept: HOME HEALTH SERVICES | Facility: HOME HEALTHCARE | Age: 85
End: 2025-01-28
Payer: MEDICARE

## 2025-01-30 ENCOUNTER — HOME CARE VISIT (OUTPATIENT)
Dept: HOME HEALTH SERVICES | Facility: HOME HEALTHCARE | Age: 85
End: 2025-01-30
Payer: MEDICARE

## 2025-01-30 PROCEDURE — G0153 HHCP-SVS OF S/L PATH,EA 15MN: HCPCS

## 2025-01-30 PROCEDURE — G0151 HHCP-SERV OF PT,EA 15 MIN: HCPCS

## 2025-02-01 VITALS — DIASTOLIC BLOOD PRESSURE: 72 MMHG | TEMPERATURE: 97.4 F | HEART RATE: 68 BPM | SYSTOLIC BLOOD PRESSURE: 132 MMHG

## 2025-02-01 NOTE — HOME HEALTH
Patient had an episode of diahrrea early in the week and this has made him a bit weaker since.  He indeed was more unsteady today and had mutlple LOB when standing and walking.  Use of walker at all times today vs walking with no AD.  Paid caregiver present and instructed to have direct, hands on assist at all times with him today and to use the walker.  She agreed. Patient denied falls at first but wife indicated a fall on Tuesday.  I will need to talk to her to get specific details.      Plan for next visit  Review/amend HEP.   Review falls history with his wife.

## 2025-02-02 VITALS
DIASTOLIC BLOOD PRESSURE: 76 MMHG | RESPIRATION RATE: 98 BRPM | TEMPERATURE: 97.8 F | OXYGEN SATURATION: 99 % | HEART RATE: 64 BPM | SYSTOLIC BLOOD PRESSURE: 110 MMHG

## 2025-02-03 ENCOUNTER — TELEPHONE (OUTPATIENT)
Dept: INTERNAL MEDICINE | Facility: CLINIC | Age: 85
End: 2025-02-03

## 2025-02-03 NOTE — TELEPHONE ENCOUNTER
Caller: Carol Boo    Relationship: Emergency Contact    Best call back number: [phone]       What was the call regarding: FYI     LAST WEEK THEY WERE ABLE TO SPEAK WITH A PA VIA [employer], WHO IS A PA WITH  KAY'S NEURO PROVIDER.  KAY HAS AN APPOINTMENT SCHEDULED WITH THEIR RECOMMENDED NEURO SURGEON.  HE WILL DECIDED ON THE MRI AFTER HIS EVALUATION.      ANY QUESTIONS OR CONCERNS PLEASE CONTACT CAROL

## 2025-02-04 ENCOUNTER — HOME CARE VISIT (OUTPATIENT)
Dept: HOME HEALTH SERVICES | Facility: HOME HEALTHCARE | Age: 85
End: 2025-02-04
Payer: MEDICARE

## 2025-02-04 PROCEDURE — G0151 HHCP-SERV OF PT,EA 15 MIN: HCPCS

## 2025-02-04 NOTE — Clinical Note
"DR. Lopez                      REGARDING Ingrid Boo            DATE OF BIRTH [birth year]                      HOME HEALTH AGENCIES ARE REQUIRED BY FEDERAL STATE AND ACCREDITATION REGULATIONS TO NOTIFY THE PHYSICIAN OF THE FOLLOWING PATIENT RELATED INCIDENT. PLEASE CALL [phone] IF YOU HAVE ANY QUESTIONS.                       PATIENT HAD AN UNOBSERVED FALL ON 1-28-25 AND HIT HIS HEAD.  Patient reported \"not hitting it hard\" and said he was not injured.                        THANK YOU,            Kwaku Brown, PT            [facility]  "

## 2025-02-05 ENCOUNTER — HOME CARE VISIT (OUTPATIENT)
Dept: HOME HEALTH SERVICES | Facility: HOME HEALTHCARE | Age: 85
End: 2025-02-05
Payer: MEDICARE

## 2025-02-05 ENCOUNTER — TELEPHONE (OUTPATIENT)
Dept: INTERNAL MEDICINE | Facility: CLINIC | Age: 85
End: 2025-02-05
Payer: MEDICARE

## 2025-02-05 PROCEDURE — G0153 HHCP-SVS OF S/L PATH,EA 15MN: HCPCS

## 2025-02-05 NOTE — Clinical Note
Upon arrival patient to home, pt no longer has coffee table in middle of living room. Pt deferred answering question to wife initially, however when prompted reported that he fell yesterday evening while attempting to take plate of food off of walker tray and lost balance and fell into table. Pt denied any major injuries or hitting head. He has small bruise to left wrist and reports that his left hip is sore but not painful. SLP provided education to patient/caregiver regarding safety strategies, use of assistive devices, ways to reduce falls and prompt pt to id what he could do differently to prevent falls. SLP notified physical therapist.

## 2025-02-05 NOTE — TELEPHONE ENCOUNTER
If he is not, I recommend he use a wheelchair for a while for improved stability and to decrease his fall risk although he needs to be careful with transfers    We can arrange a device order if needed

## 2025-02-05 NOTE — TELEPHONE ENCOUNTER
"  Caller: Gabo Boo \"KAY\"    Relationship: Self    Best call back number: [phone]     Who are you requesting to speak with (clinical staff, provider,  specific staff member): CLINICAL STAFF    What was the call regarding: PATIENT CALLING TO REPORT A FALL LAST NIGHT.  HE REPORTS HE WAS NOT INJURED.  PLEASE CALL TO FOLLOW UP IF NEEDED  "

## 2025-02-06 ENCOUNTER — HOME CARE VISIT (OUTPATIENT)
Dept: HOME HEALTH SERVICES | Facility: HOME HEALTHCARE | Age: 85
End: 2025-02-06
Payer: MEDICARE

## 2025-02-06 VITALS
RESPIRATION RATE: 16 BRPM | SYSTOLIC BLOOD PRESSURE: 114 MMHG | OXYGEN SATURATION: 97 % | HEART RATE: 52 BPM | DIASTOLIC BLOOD PRESSURE: 66 MMHG

## 2025-02-06 VITALS
SYSTOLIC BLOOD PRESSURE: 110 MMHG | OXYGEN SATURATION: 99 % | DIASTOLIC BLOOD PRESSURE: 70 MMHG | TEMPERATURE: 97.4 F | HEART RATE: 62 BPM

## 2025-02-06 PROCEDURE — G0153 HHCP-SVS OF S/L PATH,EA 15MN: HCPCS

## 2025-02-06 PROCEDURE — G0151 HHCP-SERV OF PT,EA 15 MIN: HCPCS

## 2025-02-06 NOTE — HOME HEALTH
Talked to patient and his wife at length about his last fall and sent note to his PCP about his fall.  Both report that he is doing much better since last week and walking at times to the bathroom on his own (with the walker) and the caregivers are able to have him walk with no AD.  I was able to do a mini reassessment of his function today and he is transfering at supervision only, walking with supervison only.  I explained that the next visit is to be the discharge visit unless I reassess otherwise and that further therapy can be pursued in an outpt setting.  Both voiced understanding of this.      Plan for next visit  DC or reassessment.  Finalize HEP

## 2025-02-06 NOTE — Clinical Note
Patient had a fall on 2/4/25. No injuries noted.  Today was to be the discharge visit but PT will extend services for one more week in the home to assess home safety further after this fall.  Thank you

## 2025-02-06 NOTE — HOME HEALTH
Table in living room gone,   table on top of walker with plate, unbalanced and the plate of food fell, tried to catch it  not really hurt, was able to get up on own, up in 30 seconds, felt so geeta  I have found out, dehydrated twice, 4 is minium sometimes as many as 6  honestly sometimes remind self but no issues with swallowing  pills one at a time, with water  He said will want to do pro-rehab, walking sticks in basement  left hip sore, but no pain

## 2025-02-07 VITALS
RESPIRATION RATE: 18 BRPM | TEMPERATURE: 97.6 F | DIASTOLIC BLOOD PRESSURE: 72 MMHG | SYSTOLIC BLOOD PRESSURE: 114 MMHG | HEART RATE: 70 BPM | OXYGEN SATURATION: 97 %

## 2025-02-07 VITALS
HEART RATE: 66 BPM | OXYGEN SATURATION: 100 % | DIASTOLIC BLOOD PRESSURE: 72 MMHG | SYSTOLIC BLOOD PRESSURE: 124 MMHG | TEMPERATURE: 97.6 F

## 2025-02-07 NOTE — HOME HEALTH
Patient had another fall.  He was trying to carry a tray with uneven weight and he dropped it and then he fell.  He was geeta as he fell right by a glass coffee table and the tray broke it but he did not fall into it.  Safety awarness again shown to be suspect at times despite teachings every visit about his deficits and to be conservative with his activities.  This was suppose to be the last PT visit, but will extend out 1 more week to fully address this last fall and finalize safety instructions.  Speech therapy is also involved and going to address his safety awareness as well.      Plan for next visit  Assess safety/falls awarness and do dc assessment.

## 2025-02-10 ENCOUNTER — HOME CARE VISIT (OUTPATIENT)
Dept: HOME HEALTH SERVICES | Facility: HOME HEALTHCARE | Age: 85
End: 2025-02-10
Payer: MEDICARE

## 2025-02-10 PROCEDURE — G0153 HHCP-SVS OF S/L PATH,EA 15MN: HCPCS

## 2025-02-10 NOTE — Clinical Note
Pt discharged from home health skilled ST services on 2/10/25    Summary of Care Provided: Pt received skilled ST services for mild cognitive communication deficits and suspected dysphagia. Pt had discussion with PCP and decided to trial continuing PPI before completing VFSS during this episode of care. Pt recall safe swallow strategies independently via teachback. Pt continues to have occasional throat clear, wet vocal quality following PO trials of thin liquids and intermittent wet vocal quality during conversational speech. Pt reports waking up at times during the night with need to clear throat. SLP provided education to patient regarding reflux precautions/strategies including foods to avoid and keeping head of bed elevated at night. Recommend patient continue to sit upright for all PO and remain upright for at least 30 minutes, small bites, small/single sips, slow rate, limit distractions, alternate liquids and solids. Pt to notify physician if increased s/s aspiration and to monitor for s/s pneumonia. Treatment also focus on memory, problem solving, safety awareness and verbal fluency. Pt has made good progress, however continues to present with occasional word finding difficulty in conversation, difficulty with short term recall and problem solving/safety awareness for moderately complex tasks. Pt has good support from family and paid caregivers. SLP provided education regarding neuroplasticity (if you dont use it you lose it, use it and improvie it, salience, specificity, etc) and daily cognitive commuincation tasks. Recommend continue skilled speech therapy services in outpatient setting.

## 2025-02-11 VITALS
SYSTOLIC BLOOD PRESSURE: 122 MMHG | RESPIRATION RATE: 18 BRPM | TEMPERATURE: 97.2 F | OXYGEN SATURATION: 95 % | DIASTOLIC BLOOD PRESSURE: 76 MMHG | HEART RATE: 60 BPM

## 2025-02-11 NOTE — HOME HEALTH
Discharge Summary/Summary of Care Provided: Pt received skilled ST services for mild cognitive communication deficits and suspected dysphagia.   Pt had discussion with PCP and decided to trial continuing PPI before completing VFSS during this episode of care. Pt recall safe swallow strategies independently via teachback. Pt continues to have occasional throat clear, wet vocal quality following PO trials of thin liquids and intermittent wet vocal quality during conversational speech. Pt reports waking up at times during the night with need to clear throat. SLP provided education to patient regarding reflux precautions/strategies including foods to avoid and keeping head of bed elevated at night. Recommend patient continue to sit upright for all PO and remain upright for at least 30 minutes, small bites, small/single sips, slow rate, limit distractions, alternate liquids and solids. Pt to notify physician if increased s/s aspiration and to monitor for s/s pneumonia. Treatment also focus on memory, problem solving, safety awareness and verbal fluency. Pt has made good progress, however continues to present with occasional word finding difficulty in conversation, difficulty with short term recall and problem solving/safety awareness for moderately complex tasks. Pt has good support from family and paid caregivers. SLP provided education regarding neuroplasticity (if you dont use it you lose it, use it and improvie it, salience, specificity, etc) and daily cognitive commuincation tasks. Recommend continue skilled speech therapy services in outpatient setting.     Patient received home health for diagnosis: Hypertensive heart disease with heart failure    Current level of functional ability: Pt requires assistance for management of medications, he manages appointments and keeps schedule book, wife and caregivers provide assistance with managment of meals. Pt uses walker for ambulation.     Living arrangements: Pt lives  with spouse and nephews. Has hired caregiver for 5 hours during day.     Progress towards goals and/or Were all goals met? yes    If not all goals met, barriers that prevented patient from meeting goals: n/a adequate for care transition    SDOH concerns (i.e. Caregiver availability, social isolation, environment, income, transportation access, food insecurity etc.) None    Follow-up appointment plans and community resources provided: none

## 2025-02-13 ENCOUNTER — HOME CARE VISIT (OUTPATIENT)
Dept: HOME HEALTH SERVICES | Facility: HOME HEALTHCARE | Age: 85
End: 2025-02-13
Payer: MEDICARE

## 2025-02-13 PROCEDURE — G0151 HHCP-SERV OF PT,EA 15 MIN: HCPCS

## 2025-02-13 NOTE — Clinical Note
Home health discharge as of 2/13/25.  He has improved and doing some things on his own, but will remain mostly at a supervision level due to neuro deficits.

## 2025-02-14 VITALS
TEMPERATURE: 97.3 F | HEART RATE: 51 BPM | DIASTOLIC BLOOD PRESSURE: 76 MMHG | SYSTOLIC BLOOD PRESSURE: 124 MMHG | OXYGEN SATURATION: 100 %

## 2025-02-14 NOTE — HOME HEALTH
Patient up in rollator when I arrived.  Family still has paid help for supervision.  Patient is doing some basic things on his own (using the rollator).  He will remain with some balance and safety deficits due to his neuro condition and I spoke to the family again on this and they voice understanding.  Patient wants to start driving again and he and his wife advised on utilizing the [employer] driving [employer] to assess his abilities and they agreed. DC today.

## 2025-02-28 DIAGNOSIS — I50.32 CHRONIC HEART FAILURE WITH PRESERVED EJECTION FRACTION: ICD-10-CM

## 2025-02-28 DIAGNOSIS — I50.32 CHRONIC HEART FAILURE WITH PRESERVED EJECTION FRACTION: Primary | ICD-10-CM

## 2025-03-01 LAB
ALBUMIN SERPL-MCNC: 4.1 G/DL (ref 3.7–4.7)
ALP SERPL-CCNC: 162 IU/L (ref 44–121)
ALT SERPL-CCNC: 14 IU/L (ref 0–44)
AST SERPL-CCNC: 19 IU/L (ref 0–40)
BASOPHILS # BLD AUTO: 0.1 X10E3/UL (ref 0–0.2)
BASOPHILS NFR BLD AUTO: 1 %
BILIRUB SERPL-MCNC: 0.8 MG/DL (ref 0–1.2)
BNP SERPL-MCNC: 379.9 PG/ML (ref 0–100)
BUN SERPL-MCNC: 22 MG/DL (ref 8–27)
BUN/CREAT SERPL: 19 (ref 10–24)
CALCIUM SERPL-MCNC: 9.2 MG/DL (ref 8.6–10.2)
CHLORIDE SERPL-SCNC: 94 MMOL/L (ref 96–106)
CO2 SERPL-SCNC: 25 MMOL/L (ref 20–29)
CREAT SERPL-MCNC: 1.17 MG/DL (ref 0.76–1.27)
EGFRCR SERPLBLD CKD-EPI 2021: 62 ML/MIN/1.73
EOSINOPHIL # BLD AUTO: 0.3 X10E3/UL (ref 0–0.4)
EOSINOPHIL NFR BLD AUTO: 4 %
ERYTHROCYTE [DISTWIDTH] IN BLOOD BY AUTOMATED COUNT: 12.8 % (ref 11.6–15.4)
GLOBULIN SER CALC-MCNC: 2.7 G/DL (ref 1.5–4.5)
GLUCOSE SERPL-MCNC: 102 MG/DL (ref 70–99)
HCT VFR BLD AUTO: 39.9 % (ref 37.5–51)
HGB BLD-MCNC: 13.4 G/DL (ref 13–17.7)
IMM GRANULOCYTES # BLD AUTO: 0 X10E3/UL (ref 0–0.1)
IMM GRANULOCYTES NFR BLD AUTO: 0 %
LYMPHOCYTES # BLD AUTO: 0.9 X10E3/UL (ref 0.7–3.1)
LYMPHOCYTES NFR BLD AUTO: 14 %
MCH RBC QN AUTO: 34.3 PG (ref 26.6–33)
MCHC RBC AUTO-ENTMCNC: 33.6 G/DL (ref 31.5–35.7)
MCV RBC AUTO: 102 FL (ref 79–97)
MONOCYTES # BLD AUTO: 0.7 X10E3/UL (ref 0.1–0.9)
MONOCYTES NFR BLD AUTO: 12 %
NEUTROPHILS # BLD AUTO: 4.4 X10E3/UL (ref 1.4–7)
NEUTROPHILS NFR BLD AUTO: 69 %
PLATELET # BLD AUTO: 310 X10E3/UL (ref 150–450)
POTASSIUM SERPL-SCNC: 4.5 MMOL/L (ref 3.5–5.2)
PROT SERPL-MCNC: 6.8 G/DL (ref 6–8.5)
RBC # BLD AUTO: 3.91 X10E6/UL (ref 4.14–5.8)
SODIUM SERPL-SCNC: 134 MMOL/L (ref 134–144)
WBC # BLD AUTO: 6.4 X10E3/UL (ref 3.4–10.8)

## 2025-03-10 ENCOUNTER — OFFICE VISIT (OUTPATIENT)
Dept: INTERNAL MEDICINE | Facility: CLINIC | Age: 85
End: 2025-03-10
Payer: MEDICARE

## 2025-03-10 VITALS
BODY MASS INDEX: 23.17 KG/M2 | SYSTOLIC BLOOD PRESSURE: 124 MMHG | HEART RATE: 59 BPM | DIASTOLIC BLOOD PRESSURE: 72 MMHG | WEIGHT: 161.5 LBS | TEMPERATURE: 97.7 F | OXYGEN SATURATION: 100 %

## 2025-03-10 DIAGNOSIS — J90 BILATERAL PLEURAL EFFUSION: ICD-10-CM

## 2025-03-10 DIAGNOSIS — G31.84 MILD COGNITIVE IMPAIRMENT: Primary | ICD-10-CM

## 2025-03-10 DIAGNOSIS — I50.32 CHRONIC HEART FAILURE WITH PRESERVED EJECTION FRACTION: ICD-10-CM

## 2025-03-10 PROCEDURE — 99213 OFFICE O/P EST LOW 20 MIN: CPT | Performed by: FAMILY MEDICINE

## 2025-03-10 PROCEDURE — 3074F SYST BP LT 130 MM HG: CPT | Performed by: FAMILY MEDICINE

## 2025-03-10 PROCEDURE — 3078F DIAST BP <80 MM HG: CPT | Performed by: FAMILY MEDICINE

## 2025-03-10 RX ORDER — DAPAGLIFLOZIN 10 MG/1
10 TABLET, FILM COATED ORAL DAILY
COMMUNITY
Start: 2025-02-18

## 2025-03-10 NOTE — PROGRESS NOTES
Date of Encounter: 03/10/2025  Patient: Gabo Boo,  1940    Subjective   History of Presenting Illness  Chief complaint: Follow-up    Patient presents for follow-up recent debility    Since our last visit he has gained about 10 pounds with improved appetite.  He was also started on farxiga about 3 weeks ago.  Wife has continued to notice issues with short-term memory, executive impairment, but does not feel there has been any progression since our last visit.  Wife is concerned about his ability to drive, he is interested in driving if able.  He is doing physical therapy without a walker, but does keep it nearby and in public if needed.  He has not felt short of breath.    Review of Systems:  Negative for fever, congestion, chest pain upon exertion, shortness of breath, vision changes, vomiting, dysuria, lymphadenopathy, muscle weakness, numbness, mood changes, rashes.    The following portions of the patient's history were reviewed and updated as appropriate: allergies, current medications, past family history, past medical history, past social history, past surgical history and problem list.    Patient Active Problem List   Diagnosis    Anxiety    Hyperlipidemia    Resistant hypertension    Insomnia    Prediabetes    Focal epilepsy    Hygroma    Seizure    Partial seizures    Vitamin D deficiency    H/O lead exposure    Paroxysmal atrial fibrillation    Hx of adenomatous polyp of colon    Presence of Watchman left atrial appendage closure device    Ascending aortic aneurysm    Tricuspid regurgitation    Chronic heart failure with preserved ejection fraction    Atrial fibrillation with RVR    Bilateral pleural effusion    TIFFANY (obstructive sleep apnea) on CPAP    On supplemental oxygen therapy at night    Bilateral carotid artery disease    Gynecomastia    Pulmonary embolism without acute cor pulmonale    Chronic subdural hematoma    Mild cognitive impairment     Past Medical History:   Diagnosis Date     Abnormal barium swallow 07/08/2016    HH, reflux    ADHD (attention deficit hyperactivity disorder)     AF (paroxysmal atrial fibrillation)     Anemia 03/21/2021    Anxiety     Bilateral lower extremity edema 3/6/2018    CHF (congestive heart failure) 6-2023    Coronary artery disease     GERD (gastroesophageal reflux disease)     H/O cataract     Dr Heron Hoffman    Hernia, epigastric 10/14/2020    Added automatically from request for surgery [id number]    Hiatal hernia     Hyperlipidemia     Hypertension     Internal hemorrhoids     Mood disorder 07/27/2017    PONV (postoperative nausea and vomiting)     Seizures     July 2013 was last seizure    Tubular adenoma of colon     Umbilical hernia without obstruction and without gangrene 10/14/2020    Added automatically from request for surgery [id number]     Past Surgical History:   Procedure Laterality Date    CARDIAC SURGERY      CATARACT EXTRACTION Bilateral     COLONOSCOPY  04/23/2013    Dr Galenao/Maddison, [facility], tubular adenoma w/low grade dysplasia    COLONOSCOPY N/A 11/03/2020    Procedure: COLONOSCOPY;  Surgeon: Gabo Power MD;  Location: [facility] OR;  Service: General;  Laterality: N/A;    ENDOSCOPY N/A 12/16/2016    Procedure: ESOPHAGOGASTRODUODENOSCOPY WITH COLD BIOPSIES AND 54 FR KENNY DILATATION;  Surgeon: Shiraz Galeano MD;  Location: [facility] ENDOSCOPY;  Service:     INGUINAL HERNIA REPAIR      as a child and again in 1970 approx.    KRISTYN Left 03/2019    Left Atrial Appendage Occlusion W KRISTYN    TONSILLECTOMY      VENTRAL/INCISIONAL HERNIA REPAIR N/A 11/03/2020    Procedure: VENTRAL HERNIA REPAIR WITH MESH;  Surgeon: Gabo Power MD;  Location: [facility] OR;  Service: General;  Laterality: N/A;    WRIST SURGERY Right      Family History   Problem Relation Age of Onset    Stroke Father     COPD Father     Thyroid disease Daughter     Thyroid disease Daughter     Thyroid disease Daughter     Thyroid disease Daughter     Malig Hyperthermia Neg Hx         Current Outpatient Medications:     Astaxanthin 4 MG capsule, Take 1 tablet by mouth Daily. Indications: supplement, Disp: , Rfl:     B Complex Vitamins (VITAMIN B COMPLEX) capsule capsule, Take 1 capsule by mouth Daily. Indications: SUPPLEMENT, Disp: , Rfl:     Berberine Chloride (BERBERINE HCI PO), Take 1 dose by mouth Daily. Indications: unknown, Disp: , Rfl:     bumetanide (BUMEX) 1 MG tablet, Take 1mg daily alternating with 0.5mg daily every other day  Indications: Edema, Disp: , Rfl:     Coenzyme Q10 (CoQ10) 100 MG capsule, Take 1 capsule by mouth 2 (Two) Times a Day. WITH OBQUINOL IN IT.   Indications: heart failure, Disp: , Rfl:     dapagliflozin Propanediol 10 MG tablet, Take 10 mg by mouth Daily., Disp: , Rfl:     dilTIAZem CD (CARDIZEM CD) 120 MG 24 hr capsule, Take 1 capsule by mouth Daily. Indications: a. fib., Disp: , Rfl:     lacosamide (Vimpat) 200 MG tablet, Take 1 tablet by mouth 2 (Two) Times a Day. TAKES BRAND VIMPAT  Indications: Seizures that are Not Controlled, Disp: , Rfl:     Lactobacillus (PROBIOTIC ACIDOPHILUS PO), Take 1 tablet by mouth Daily. Indications: general wellness, Disp: , Rfl:     Magnesium Oxide 400 MG capsule, Take 200 mg by mouth Daily. 1/2 TABLET DAILY  Indications: UNKNOWN, Disp: , Rfl:     Melatonin 3 MG tablet dispersible, Place 1 tablet on the tongue Every Night. Indications: SLEEP, Disp: , Rfl:     metoprolol succinate XL (TOPROL-XL) 50 MG 24 hr tablet, Take 1 tablet by mouth 2 (Two) Times a Day. Indications: Atrial Fibrillation (Patient taking differently: Take 2 tablets by mouth 2 (Two) Times a Day. Indications: Atrial Fibrillation), Disp: 180 tablet, Rfl: 3    Misc Natural Products (CORTISOL MANAGER PO), Take 1 tablet by mouth Daily. Indications: stress hormone stabilzer, Disp: , Rfl:     Multiple Vitamin (MULTIVITAMIN PO), Take 1 tablet by mouth Daily. Indications: SUPPLEMENT, Disp: , Rfl:     Nattokinase 100 MG capsule, Take 200 mg by mouth Daily.  Indications: general wellness, Disp: , Rfl:     Omega-3 Fatty Acids (OMEGA-3 FISH OIL PO), Take 1 capsule by mouth Daily. Indications: general wellness, Disp: , Rfl:     Probiotic Product (PROBIOTIC DAILY PO), Take 1 dose by mouth Daily. Indications: digestive health, Disp: , Rfl:     Psyllium (METAMUCIL PO), Take 1 dose by mouth Daily. Indications: constipation, Disp: , Rfl:     spironolactone (ALDACTONE) 25 MG tablet, Take 0.5 tablets by mouth Daily. Indications: heart failure, Disp: , Rfl:     Unable to find, Take 1 each by mouth Daily. Ultrabrain  Indications: mental health, Disp: , Rfl:     Current Facility-Administered Medications:     cyanocobalamin injection 1,000 mcg, 1,000 mcg, Intramuscular, Q28 Days, Tomas Lopez MD  No Known Allergies  Social History     Tobacco Use    Smoking status: Never    Smokeless tobacco: Never   Vaping Use    Vaping status: Never Used   Substance Use Topics    Alcohol use: Yes     Alcohol/week: 1.0 standard drink of alcohol     Types: 1 Cans of beer per week     Comment: daily    Drug use: Never          Objective   Physical Exam  Vitals:    03/10/25 1501   BP: 124/72   Pulse: 59   Temp: 97.7 °F (36.5 °C)   TempSrc: Infrared   SpO2: 100%   Weight: 73.3 kg (161 lb 8 oz)     Body mass index is 23.17 kg/m².    Constitutional: NAD.  Cardiovascular: Irregularly irregular. No murmurs. No LE edema b/l. Radial pulses 1+ bilaterally.  Pulmonary: CTA b/l. Good effort.  I do not appreciate his pleural effusions today  Integumentary: No rashes or wounds on face or upper extremities.  Psychiatric: Normal affect. Normal thought content.  Neurologic: 10/10 naming in under 1 minute, but struggles and repeats himself several times after just 5 words.  Forgets what he wanted to ask me about today (driving).  3/5 for delayed recall.       Assessment & Plan   Assessment and Plan  Pleasant 84-year-old male with seizure disorder, traumatic bilateral subdural hematomas, paroxysmal atrial fibrillation  with watchman device and recurrent RVR, heart failure with preserved ejection fraction and bilateral pleural effusions, ascending aortic aneurysm, resistant hypertension, hyperlipidemia, history of prediabetes, vitamin D deficiency, insomnia, among other problems, who presents for the following:     Diagnoses and all orders for this visit:    1. Mild cognitive impairment (Primary): Appears stable.  I again feel this is multifactorial related to bilateral subdurals, recent hospitalizations.  He does have an upcoming appointment with neurology.  He is not a good candidate for antiamyloid therapy due to history of SDH.  Discussed medications such as memantine and donepezil, they opted to observe for now.    He does ask me about driving after being reminded by his wife.  He has gained weight, physical capacity, and there have been no further decline in his cognitive impairment.  His pleural effusions and shortness of breath are markedly improved with recent farxiga addition. His wife and nephews are willing to drive him if needed.  At this time there is no urgency for him to return to driving and I recommend against it. If he desires return to driving strongly I would have him go through the [facility] rehabilitation course.    2. Bilateral pleural effusion: Improved.  Would continue Farxiga  3. Chronic heart failure with preserved ejection fraction    BMI is within normal parameters. No other follow-up for BMI required.    Recommend he follow-up in about 3 months    Tomas Lopez MD  Family Medicine  O: [phone]    Disclaimer: Parts of this note were dictated by speech recognition. Minor errors in transcription may be present. Please call if questions.

## 2025-03-21 ENCOUNTER — TELEPHONE (OUTPATIENT)
Dept: INTERNAL MEDICINE | Facility: CLINIC | Age: 85
End: 2025-03-21

## 2025-03-21 NOTE — TELEPHONE ENCOUNTER
Caller: Travis Boo    Relationship: Emergency Contact    Best call back number: [phone]    Caller requesting test results:     What test was performed: CT HEAD AND NECK W/O    When was the test performed: 03/20/2025    Where was the test performed: [facility]    Additional notes: PATIENT WIFE CALLING IN BECAUSE [facility] CALLED WITH THE RESULTS OF A CT AND ARE SUGGESTING SURGERY. WOULD LIKE Tomas Lopez MD TO REVIEW AND SEE WHAT HE THINKS AND GIVE HER CALL BACK. THE SURGERY THEY ARE RECOMMENDING IS THE MEDIUM MIDLINE ARTERY EMBOLISM.

## 2025-03-28 ENCOUNTER — READMISSION MANAGEMENT (OUTPATIENT)
Dept: CALL CENTER | Facility: HOSPITAL | Age: 85
End: 2025-03-28
Payer: MEDICARE

## 2025-03-28 NOTE — OUTREACH NOTE
Prep Survey      Flowsheet Row Responses   [facility] facility patient discharged from? Non-[facility]   Is LACE score < 7 ? Non-BH Discharge   Eligibility [facility]   Date of Admission 03/27/25   Date of Discharge 03/28/25   Discharge diagnosis SDH (subdural hematoma)   Does the patient have one of the following disease processes/diagnoses(primary or secondary)? Other   Prep survey completed? Yes            Tenisha GIBBONS - Registered Nurse

## 2025-03-31 ENCOUNTER — TRANSITIONAL CARE MANAGEMENT TELEPHONE ENCOUNTER (OUTPATIENT)
Dept: CALL CENTER | Facility: HOSPITAL | Age: 85
End: 2025-03-31
Payer: MEDICARE

## 2025-03-31 NOTE — OUTREACH NOTE
Call Center TCM Note      Flowsheet Row Responses   [facility] patient discharged from? Non-  [[facility]]   Does the patient have one of the following disease processes/diagnoses(primary or secondary)? Other   TCM attempt successful? Yes   Call start time 1514   Call end time 1517   Discharge diagnosis SDH (subdural hematoma)   Is patient permission given to speak with other caregiver? Yes   List who call center can speak with Travis Boo--Spouse   Person spoke with today (if not patient) and relationship Travis Boo--Spouse   Meds reviewed with patient/caregiver? Yes   Is the patient having any side effects they believe may be caused by any medication additions or changes? No   Does the patient have all medications ordered at discharge? Yes   Prescription comments No questions or concerns with medications.   Is the patient taking all medications as directed (includes completed medication regime)? Yes   Comments Wife declines a PCP HOSPITAL f/u appt at time of call. Shes states patient will f/u with Neurosurgery at this time.   Does the patient have an appointment with their PCP within 7-14 days of discharge? No   Nursing Interventions Patient declined scheduling/rescheduling appointment at this time, Patient desires to follow up with specialty only   Has home health visited the patient within 72 hours of discharge? N/A   Psychosocial issues? No   Comments Wife states patient is doing well post procedure with Neurosurgery at [facility].   Did the patient receive a copy of their discharge instructions? Yes   Nursing interventions Reviewed instructions with patient  [with spouse--Travis]   What is the patient's perception of their health status since discharge? Improving   Is the patient/caregiver able to teach back signs and symptoms related to disease process for when to call PCP? Yes   Is the patient/caregiver able to teach back signs and symptoms related to disease process for when to call 911?  Yes   Is the patient/caregiver able to teach back the hierarchy of who to call/visit for symptoms/problems? PCP, Specialist, Home health nurse, Urgent Care, ED, 911 Yes   If the patient is a current smoker, are they able to teach back resources for cessation? Not a smoker   TCM call completed? Yes   Wrap up additional comments Wife declines a PCP HOSPITAL f/u appt at time of call. Shes states patient will f/u with Neurosurgery at this time.   Call end time 1517   Would this patient benefit from a Referral to [facility] Social Work? No   Is the patient interested in additional calls from an ambulatory ? No            Deisy KOEHLER - Registered Nurse    3/31/2025, 15:19 EDT            Deisy Barajas Nurse

## 2025-03-31 NOTE — TELEPHONE ENCOUNTER
[facility] for pt's wife (on HIPAA) advising that pt proceed with recommended surgery based on consult note. Advised pt's wife that Dr. Lopez is leaving office and pt will need to establish care with another primary care provider as soon as possible.

## 2025-08-25 ENCOUNTER — OFFICE VISIT (OUTPATIENT)
Dept: WOUND CARE | Facility: HOSPITAL | Age: 85
End: 2025-08-25
Payer: MEDICARE

## 2025-08-25 PROCEDURE — 97602 WOUND(S) CARE NON-SELECTIVE: CPT

## 2025-08-25 PROCEDURE — G0463 HOSPITAL OUTPT CLINIC VISIT: HCPCS

## 2025-08-26 ENCOUNTER — TRANSCRIBE ORDERS (OUTPATIENT)
Dept: ADMINISTRATIVE | Facility: HOSPITAL | Age: 85
End: 2025-08-26
Payer: MEDICARE

## 2025-08-26 DIAGNOSIS — I70.229 ATHEROSCLEROSIS OF NATIVE ARTERY OF EXTREMITY WITH REST PAIN, UNSPECIFIED EXTREMITY: Primary | ICD-10-CM

## (undated) DEVICE — GLV SURG BIOGEL LTX PF 6

## (undated) DEVICE — VIOLET BRAIDED (POLYGLACTIN 910), SYNTHETIC ABSORBABLE SUTURE: Brand: COATED VICRYL

## (undated) DEVICE — SINGLE-USE BIOPSY FORCEPS: Brand: RADIAL JAW 4

## (undated) DEVICE — GOWN ,SIRUS,NONREINFORCED SMALL: Brand: MEDLINE

## (undated) DEVICE — SUT VIC 3/0 TIES 18IN J110T

## (undated) DEVICE — NDL HYPO ECLPS SFTY 22G 1 1/2IN

## (undated) DEVICE — TRAP FLD MINIVAC MEGADYNE 100ML

## (undated) DEVICE — SKIN PREP TRAY W/CHG: Brand: MEDLINE INDUSTRIES, INC.

## (undated) DEVICE — PENCL E/S ULTRAVAC TELESCP NOSE HOLSTR 10FT

## (undated) DEVICE — GLV SURG PREMIERPRO ORTHO LTX PF SZ7.5 BRN

## (undated) DEVICE — ADHS SKIN DERMABOND TOP ADVANCED

## (undated) DEVICE — PK PROC MAJ 40

## (undated) DEVICE — ELECTRD BLD EZ CLN MOD XLNG 2.75IN

## (undated) DEVICE — SUT NUROLON 0 CT1 CR8 18IN C521D